# Patient Record
Sex: MALE | Race: WHITE | NOT HISPANIC OR LATINO
[De-identification: names, ages, dates, MRNs, and addresses within clinical notes are randomized per-mention and may not be internally consistent; named-entity substitution may affect disease eponyms.]

---

## 2017-05-18 ENCOUNTER — APPOINTMENT (OUTPATIENT)
Dept: ORTHOPEDIC SURGERY | Facility: CLINIC | Age: 72
End: 2017-05-18

## 2017-06-26 ENCOUNTER — APPOINTMENT (OUTPATIENT)
Dept: ORTHOPEDIC SURGERY | Facility: CLINIC | Age: 72
End: 2017-06-26

## 2017-06-26 ENCOUNTER — FORM ENCOUNTER (OUTPATIENT)
Age: 72
End: 2017-06-26

## 2017-06-26 VITALS
DIASTOLIC BLOOD PRESSURE: 76 MMHG | HEART RATE: 71 BPM | SYSTOLIC BLOOD PRESSURE: 146 MMHG | BODY MASS INDEX: 26.18 KG/M2 | HEIGHT: 71 IN | WEIGHT: 187 LBS | TEMPERATURE: 97.9 F

## 2017-06-27 ENCOUNTER — OUTPATIENT (OUTPATIENT)
Dept: OUTPATIENT SERVICES | Facility: HOSPITAL | Age: 72
LOS: 1 days | End: 2017-06-27
Payer: COMMERCIAL

## 2017-06-27 ENCOUNTER — APPOINTMENT (OUTPATIENT)
Dept: RADIOLOGY | Facility: CLINIC | Age: 72
End: 2017-06-27

## 2017-06-27 DIAGNOSIS — Z00.8 ENCOUNTER FOR OTHER GENERAL EXAMINATION: ICD-10-CM

## 2017-06-27 PROCEDURE — 27093 INJECTION FOR HIP X-RAY: CPT

## 2017-06-27 PROCEDURE — 73525 CONTRAST X-RAY OF HIP: CPT

## 2017-07-06 ENCOUNTER — APPOINTMENT (OUTPATIENT)
Dept: ULTRASOUND IMAGING | Facility: CLINIC | Age: 72
End: 2017-07-06

## 2017-07-24 ENCOUNTER — APPOINTMENT (OUTPATIENT)
Dept: ORTHOPEDIC SURGERY | Facility: CLINIC | Age: 72
End: 2017-07-24

## 2017-07-24 VITALS
BODY MASS INDEX: 26.18 KG/M2 | SYSTOLIC BLOOD PRESSURE: 145 MMHG | HEIGHT: 71 IN | WEIGHT: 187 LBS | HEART RATE: 67 BPM | DIASTOLIC BLOOD PRESSURE: 72 MMHG

## 2017-07-24 DIAGNOSIS — M16.12 UNILATERAL PRIMARY OSTEOARTHRITIS, LEFT HIP: ICD-10-CM

## 2017-07-24 RX ORDER — METFORMIN HYDROCHLORIDE 500 MG/1
500 TABLET, COATED ORAL
Refills: 0 | Status: ACTIVE | COMMUNITY

## 2017-08-07 ENCOUNTER — OTHER (OUTPATIENT)
Age: 72
End: 2017-08-07

## 2017-09-18 ENCOUNTER — APPOINTMENT (OUTPATIENT)
Dept: ORTHOPEDIC SURGERY | Facility: CLINIC | Age: 72
End: 2017-09-18
Payer: MEDICAID

## 2017-09-18 VITALS
SYSTOLIC BLOOD PRESSURE: 136 MMHG | HEART RATE: 70 BPM | WEIGHT: 187 LBS | HEIGHT: 71 IN | DIASTOLIC BLOOD PRESSURE: 68 MMHG | BODY MASS INDEX: 26.18 KG/M2

## 2017-09-18 PROCEDURE — 99213 OFFICE O/P EST LOW 20 MIN: CPT

## 2017-09-20 ENCOUNTER — OTHER (OUTPATIENT)
Age: 72
End: 2017-09-20

## 2018-01-16 ENCOUNTER — APPOINTMENT (OUTPATIENT)
Dept: CARDIOLOGY | Facility: CLINIC | Age: 73
End: 2018-01-16
Payer: MEDICAID

## 2018-01-16 PROCEDURE — 99214 OFFICE O/P EST MOD 30 MIN: CPT

## 2018-01-16 PROCEDURE — 93000 ELECTROCARDIOGRAM COMPLETE: CPT

## 2018-01-23 ENCOUNTER — APPOINTMENT (OUTPATIENT)
Dept: CARDIOLOGY | Facility: CLINIC | Age: 73
End: 2018-01-23
Payer: MEDICAID

## 2018-01-23 PROCEDURE — 93880 EXTRACRANIAL BILAT STUDY: CPT

## 2018-01-23 PROCEDURE — 93306 TTE W/DOPPLER COMPLETE: CPT

## 2018-05-15 ENCOUNTER — APPOINTMENT (OUTPATIENT)
Dept: CARDIOLOGY | Facility: CLINIC | Age: 73
End: 2018-05-15
Payer: MEDICAID

## 2018-05-15 VITALS
SYSTOLIC BLOOD PRESSURE: 132 MMHG | DIASTOLIC BLOOD PRESSURE: 73 MMHG | HEIGHT: 71 IN | RESPIRATION RATE: 14 BRPM | BODY MASS INDEX: 26.32 KG/M2 | WEIGHT: 188 LBS | HEART RATE: 61 BPM

## 2018-05-15 PROCEDURE — 99214 OFFICE O/P EST MOD 30 MIN: CPT

## 2018-05-15 PROCEDURE — 93000 ELECTROCARDIOGRAM COMPLETE: CPT

## 2018-05-25 ENCOUNTER — RX RENEWAL (OUTPATIENT)
Age: 73
End: 2018-05-25

## 2018-06-27 ENCOUNTER — RX RENEWAL (OUTPATIENT)
Age: 73
End: 2018-06-27

## 2018-06-28 ENCOUNTER — RX RENEWAL (OUTPATIENT)
Age: 73
End: 2018-06-28

## 2018-06-28 ENCOUNTER — MEDICATION RENEWAL (OUTPATIENT)
Age: 73
End: 2018-06-28

## 2018-10-01 ENCOUNTER — RX RENEWAL (OUTPATIENT)
Age: 73
End: 2018-10-01

## 2018-11-01 ENCOUNTER — APPOINTMENT (OUTPATIENT)
Dept: CARDIOLOGY | Facility: CLINIC | Age: 73
End: 2018-11-01
Payer: COMMERCIAL

## 2018-11-01 VITALS
SYSTOLIC BLOOD PRESSURE: 138 MMHG | DIASTOLIC BLOOD PRESSURE: 73 MMHG | HEIGHT: 71 IN | BODY MASS INDEX: 24.36 KG/M2 | WEIGHT: 174 LBS | HEART RATE: 52 BPM | RESPIRATION RATE: 14 BRPM

## 2018-11-01 PROCEDURE — 99214 OFFICE O/P EST MOD 30 MIN: CPT

## 2018-11-01 PROCEDURE — 93000 ELECTROCARDIOGRAM COMPLETE: CPT

## 2018-11-01 RX ORDER — ATENOLOL 25 MG/1
25 TABLET ORAL
Qty: 90 | Refills: 3 | Status: DISCONTINUED | COMMUNITY
Start: 2018-06-28 | End: 2018-11-01

## 2018-11-06 ENCOUNTER — APPOINTMENT (OUTPATIENT)
Dept: CARDIOLOGY | Facility: CLINIC | Age: 73
End: 2018-11-06
Payer: COMMERCIAL

## 2018-11-06 PROCEDURE — 93880 EXTRACRANIAL BILAT STUDY: CPT

## 2018-11-09 ENCOUNTER — RX RENEWAL (OUTPATIENT)
Age: 73
End: 2018-11-09

## 2019-01-08 ENCOUNTER — APPOINTMENT (OUTPATIENT)
Dept: CARDIOLOGY | Facility: CLINIC | Age: 74
End: 2019-01-08
Payer: COMMERCIAL

## 2019-01-08 PROCEDURE — 93015 CV STRESS TEST SUPVJ I&R: CPT

## 2019-01-08 PROCEDURE — 78452 HT MUSCLE IMAGE SPECT MULT: CPT

## 2019-01-08 PROCEDURE — A9500: CPT

## 2019-01-08 RX ORDER — REGADENOSON 0.08 MG/ML
0.4 INJECTION, SOLUTION INTRAVENOUS
Qty: 4 | Refills: 0 | Status: COMPLETED | OUTPATIENT
Start: 2019-01-08

## 2019-01-08 RX ADMIN — REGADENOSON 5 MG/5ML: 0.08 INJECTION, SOLUTION INTRAVENOUS at 00:00

## 2019-01-15 RX ORDER — KIT FOR THE PREPARATION OF TECHNETIUM TC99M SESTAMIBI 1 MG/5ML
INJECTION, POWDER, LYOPHILIZED, FOR SOLUTION PARENTERAL
Refills: 0 | Status: COMPLETED | OUTPATIENT
Start: 2019-01-15

## 2019-01-15 RX ADMIN — KIT FOR THE PREPARATION OF TECHNETIUM TC99M SESTAMIBI 0: 1 INJECTION, POWDER, LYOPHILIZED, FOR SOLUTION PARENTERAL at 00:00

## 2019-02-08 ENCOUNTER — APPOINTMENT (OUTPATIENT)
Dept: VASCULAR SURGERY | Facility: CLINIC | Age: 74
End: 2019-02-08
Payer: COMMERCIAL

## 2019-02-08 VITALS
WEIGHT: 187 LBS | TEMPERATURE: 98.4 F | BODY MASS INDEX: 26.18 KG/M2 | HEART RATE: 55 BPM | DIASTOLIC BLOOD PRESSURE: 77 MMHG | SYSTOLIC BLOOD PRESSURE: 154 MMHG | HEIGHT: 71 IN | OXYGEN SATURATION: 96 %

## 2019-02-08 DIAGNOSIS — Z87.39 PERSONAL HISTORY OF OTHER DISEASES OF THE MUSCULOSKELETAL SYSTEM AND CONNECTIVE TISSUE: ICD-10-CM

## 2019-02-08 DIAGNOSIS — Z83.3 FAMILY HISTORY OF DIABETES MELLITUS: ICD-10-CM

## 2019-02-08 DIAGNOSIS — I83.93 ASYMPTOMATIC VARICOSE VEINS OF BILATERAL LOWER EXTREMITIES: ICD-10-CM

## 2019-02-08 PROCEDURE — 93970 EXTREMITY STUDY: CPT

## 2019-02-08 PROCEDURE — 99203 OFFICE O/P NEW LOW 30 MIN: CPT

## 2019-02-08 NOTE — PHYSICAL EXAM
[Varicose Veins Of Lower Extremities] : present [Varicose Veins Of The Right Leg] : of the right leg [Alert] : alert [Oriented to Person] : oriented to person [Oriented to Place] : oriented to place [Oriented to Time] : oriented to time [Calm] : calm [JVD] : no jugular venous distention  [Carotid Bruits] : no carotid bruits [2+] : left 2+ [Ankle Swelling (On Exam)] : not present [Ankle Swelling Bilaterally] : severe [] : not present [Abdomen Masses] : No abdominal masses [Abdomen Tenderness] : ~T ~M No abdominal tenderness [Purpura] : no purpura  [Petechiae] : no petechiae [Skin Ulcer] : no ulcer [Skin Induration] : no induration [de-identified] : AAOx3.  [de-identified] : GUME LOPEZ [de-identified] : No JVD [de-identified] : non labored breathing.  [de-identified] : DWIGHTR [FreeTextEntry1] : <2 sec cap refill [de-identified] : soft, NT, ND [de-identified] : RLE has a palpable, hard, non mobile mass on the medial aspect of calf. Varicose veins from mid calf to distal knee that are non tender to palpation.

## 2019-02-08 NOTE — ASSESSMENT
[FreeTextEntry1] : 72 yo male with diagnosis of RLE phlebitis and varicose veins from his mid calf to distal knee that is stable and in no distress. \par \par 2/8 B/L LE duplex: RLE: Chronic popliteal vein lesion from previous clot. Superficial vein runs from mid calf to inguinal region. No acute DVT nor superficial clot. \par                                LLE: No vessel disease. No DVT. \par \par \par Plan:\par - Motrin for pain\par - Compression stockings \par - Leg elevation\par - exercise \par - f/u in vascular office if phlebitis remains painful and has recurrent episodes of phlebitis, as RLE stab phlebectomy would be recommended\par - Warm compression on RLE mass

## 2019-02-08 NOTE — HISTORY OF PRESENT ILLNESS
[FreeTextEntry1] : 73 year old male w PMH of HTN and HLD that presented to office to follow up for a RLE phlebitis. One month ago he went to the ED for a painful bump on his medial calf and was diagnosed with phlebitis treated with compression stockings and warm compresses. He has no active complaints. He is able to walk more than a mile without pain. Has a remote history of RLE DVT years prior. No recurrence.  Denies history of PE, and strokes. Denies fever, chills, nausea, vomiting, chest pain, and shortness of breath. Denies claudication and rest pain and ulcers or bleeding varicose veins. He is compliant with compression stockings.

## 2019-03-05 ENCOUNTER — NON-APPOINTMENT (OUTPATIENT)
Age: 74
End: 2019-03-05

## 2019-03-05 ENCOUNTER — APPOINTMENT (OUTPATIENT)
Dept: CARDIOLOGY | Facility: CLINIC | Age: 74
End: 2019-03-05
Payer: COMMERCIAL

## 2019-03-05 VITALS
HEART RATE: 68 BPM | WEIGHT: 189 LBS | RESPIRATION RATE: 14 BRPM | BODY MASS INDEX: 26.46 KG/M2 | DIASTOLIC BLOOD PRESSURE: 63 MMHG | SYSTOLIC BLOOD PRESSURE: 124 MMHG | HEIGHT: 71 IN

## 2019-03-05 PROCEDURE — 99214 OFFICE O/P EST MOD 30 MIN: CPT

## 2019-03-05 PROCEDURE — 93000 ELECTROCARDIOGRAM COMPLETE: CPT

## 2019-03-05 NOTE — HISTORY OF PRESENT ILLNESS
[FreeTextEntry1] : He mostly has some additional somatic complaints such as arthritides of the joints of the upper lower extremities and also apparently had an episode of superficial thrombophlebitis in the right lower extremity recently which was evaluated and recommended for one compresses and anti-inflammatory medication;\par \par There was no recollection of particular injury but patient does have a history of multiple varicosities of the lower extremities;

## 2019-03-05 NOTE — REASON FOR VISIT
[Follow-Up - Clinic] : a clinic follow-up of [FreeTextEntry1] : The patient is a 73-year-old gentleman with a known history for peripheral arterial disease with moderate plaquing stenosis in the left carotid artery was also been treated for mild hypertension and hyperlipidemia presents back to the office today for general cardiac checkup;\par \par He reports for the most part he has had no significant symptoms of chest pain, shortness of breath, palpitations, dizziness or syncope.;\par

## 2019-03-05 NOTE — PHYSICAL EXAM
[General Appearance - Well Developed] : well developed [Normal Appearance] : normal appearance [Well Groomed] : well groomed [General Appearance - Well Nourished] : well nourished [No Deformities] : no deformities [General Appearance - In No Acute Distress] : no acute distress [Normal Conjunctiva] : the conjunctiva exhibited no abnormalities [Eyelids - No Xanthelasma] : the eyelids demonstrated no xanthelasmas [Normal Oral Mucosa] : normal oral mucosa [No Oral Pallor] : no oral pallor [No Oral Cyanosis] : no oral cyanosis [Normal Jugular Venous A Waves Present] : normal jugular venous A waves present [Normal Jugular Venous V Waves Present] : normal jugular venous V waves present [No Jugular Venous Forman A Waves] : no jugular venous forman A waves [Respiration, Rhythm And Depth] : normal respiratory rhythm and effort [Exaggerated Use Of Accessory Muscles For Inspiration] : no accessory muscle use [Auscultation Breath Sounds / Voice Sounds] : lungs were clear to auscultation bilaterally [Abdomen Soft] : soft [Abdomen Tenderness] : non-tender [Abdomen Mass (___ Cm)] : no abdominal mass palpated [Abnormal Walk] : normal gait [Gait - Sufficient For Exercise Testing] : the gait was sufficient for exercise testing [Nail Clubbing] : no clubbing of the fingernails [Cyanosis, Localized] : no localized cyanosis [Petechial Hemorrhages (___cm)] : no petechial hemorrhages [Fingers Osler's Nodes] : Osler's nodes were not seenon the fingers [FreeTextEntry1] : Moderate varicosities of the lower legs noted bilaterally [Skin Color & Pigmentation] : normal skin color and pigmentation [] : no rash [No Venous Stasis] : no venous stasis [Skin Lesions] : no skin lesions [No Skin Ulcers] : no skin ulcer [No Xanthoma] : no  xanthoma was observed [Oriented To Time, Place, And Person] : oriented to person, place, and time [Affect] : the affect was normal [Mood] : the mood was normal [No Anxiety] : not feeling anxious

## 2019-03-05 NOTE — ASSESSMENT
[FreeTextEntry1] : EKG shows normal sinus rhythm at a rate of 68, early R-wave transition V1 to V2, rare PAC noted and no acute changes;\par \par \par Pharmacologic nuclear stress test performed in January 2019 demonstrated no evidence of any myocardial perfusion abnormalities and with normal LV systolic function and normal ejection fraction i.e. negative test;\par \par In summary the patient is a 73-year-old gentleman with a history of moderate carotid plaquing stenosis and negative nuclear stress test recently in the office who has had an otherwise stable cardiac pattern with some superficial thrombophlebitis which seems to be resolving.;\par \par Plan:\par \par Patient counseled on continuing her multiple medical regimen;\par \par Timely checkups and laboratory blood tests for primary care and encouraged;\par \par No additional cardiac workup indicated at this time;\par \par Will likely recheck carotid duplex study sometime this fall for comparison;\par Followup within 4-5 months or p.r.n.

## 2019-04-15 ENCOUNTER — MEDICATION RENEWAL (OUTPATIENT)
Age: 74
End: 2019-04-15

## 2019-04-15 ENCOUNTER — RX RENEWAL (OUTPATIENT)
Age: 74
End: 2019-04-15

## 2019-05-23 ENCOUNTER — RX RENEWAL (OUTPATIENT)
Age: 74
End: 2019-05-23

## 2019-08-07 ENCOUNTER — NON-APPOINTMENT (OUTPATIENT)
Age: 74
End: 2019-08-07

## 2019-08-07 ENCOUNTER — APPOINTMENT (OUTPATIENT)
Dept: CARDIOLOGY | Facility: CLINIC | Age: 74
End: 2019-08-07
Payer: COMMERCIAL

## 2019-08-07 ENCOUNTER — MEDICATION RENEWAL (OUTPATIENT)
Age: 74
End: 2019-08-07

## 2019-08-07 VITALS
HEART RATE: 72 BPM | DIASTOLIC BLOOD PRESSURE: 72 MMHG | RESPIRATION RATE: 14 BRPM | SYSTOLIC BLOOD PRESSURE: 128 MMHG | BODY MASS INDEX: 25.06 KG/M2 | HEIGHT: 71 IN | WEIGHT: 179 LBS

## 2019-08-07 DIAGNOSIS — I83.819 VARICOSE VEINS OF UNSPECIFIED LOWER EXTREMITY WITH PAIN: ICD-10-CM

## 2019-08-07 DIAGNOSIS — I80.3 PHLEBITIS AND THROMBOPHLEBITIS OF LOWER EXTREMITIES, UNSPECIFIED: ICD-10-CM

## 2019-08-07 PROCEDURE — 93000 ELECTROCARDIOGRAM COMPLETE: CPT

## 2019-08-07 PROCEDURE — 99214 OFFICE O/P EST MOD 30 MIN: CPT

## 2019-08-07 NOTE — PHYSICAL EXAM
[General Appearance - Well Developed] : well developed [Normal Appearance] : normal appearance [Well Groomed] : well groomed [General Appearance - Well Nourished] : well nourished [No Deformities] : no deformities [General Appearance - In No Acute Distress] : no acute distress [Normal Conjunctiva] : the conjunctiva exhibited no abnormalities [Eyelids - No Xanthelasma] : the eyelids demonstrated no xanthelasmas [Normal Oral Mucosa] : normal oral mucosa [No Oral Pallor] : no oral pallor [No Oral Cyanosis] : no oral cyanosis [Normal Jugular Venous A Waves Present] : normal jugular venous A waves present [Normal Jugular Venous V Waves Present] : normal jugular venous V waves present [No Jugular Venous Forman A Waves] : no jugular venous forman A waves [Respiration, Rhythm And Depth] : normal respiratory rhythm and effort [Exaggerated Use Of Accessory Muscles For Inspiration] : no accessory muscle use [Auscultation Breath Sounds / Voice Sounds] : lungs were clear to auscultation bilaterally [Abdomen Soft] : soft [Abdomen Tenderness] : non-tender [Abdomen Mass (___ Cm)] : no abdominal mass palpated [Abnormal Walk] : normal gait [Gait - Sufficient For Exercise Testing] : the gait was sufficient for exercise testing [Nail Clubbing] : no clubbing of the fingernails [Cyanosis, Localized] : no localized cyanosis [Petechial Hemorrhages (___cm)] : no petechial hemorrhages [FreeTextEntry1] : Moderate varicosities of the lower legs noted bilaterally-right greater than the left [Skin Color & Pigmentation] : normal skin color and pigmentation [] : no rash [No Venous Stasis] : no venous stasis [Skin Lesions] : no skin lesions [No Skin Ulcers] : no skin ulcer [No Xanthoma] : no  xanthoma was observed [Oriented To Time, Place, And Person] : oriented to person, place, and time [Affect] : the affect was normal [Mood] : the mood was normal [No Anxiety] : not feeling anxious

## 2019-08-07 NOTE — HISTORY OF PRESENT ILLNESS
[FreeTextEntry1] : He's been tolerating his current medical regimen without difficulty;\par \par States that his diabetes has been controlled on current regimen\par \par ;

## 2019-08-07 NOTE — ASSESSMENT
[FreeTextEntry1] : EKG shows normal sinus rhythm at a rate of 66 essentially within normal limits\par \par In summary the patient is a 73-year-old gentleman with a history of mild hypertension well-controlled on current medical regimen, hyperlipidemia and diabetes who's had a stable cardiac pattern\par \par Last nuclear stress test was November 2018 and was negative for ischemia;\par \par Plan:\par \par No additional cardiac workup indicated at this time;\par \par Patient encouraged to continue current medical regimen and moderate exercise regimen\par \par Precautions about varicose veins discussed;\par \par Followup with primary care for timely checkups and laboratory blood tests encouraged;\par \par Return to office within 6 months or p.r.n.;;

## 2019-08-07 NOTE — REASON FOR VISIT
[Follow-Up - Clinic] : a clinic follow-up of [FreeTextEntry1] : The patient is a 73-year-old white male with a known history for carotid artery plaquing, hypertension and hyperlipidemia who presents back to the office for general cardiac checkup;\par \par Patient has been doing well through the summer but reminds me he did have a case of phlebitis of the lower right leg ankle earlier this year which responded to nonsteroidal anti-inflammatory meds;\par \par Otherwise, he denies chest pain, shortness of breath, palpitations or syncope. He admits to occasional or rare feeling dizziness;

## 2020-01-27 ENCOUNTER — NON-APPOINTMENT (OUTPATIENT)
Age: 75
End: 2020-01-27

## 2020-01-27 ENCOUNTER — APPOINTMENT (OUTPATIENT)
Dept: CARDIOLOGY | Facility: CLINIC | Age: 75
End: 2020-01-27
Payer: COMMERCIAL

## 2020-01-27 VITALS
RESPIRATION RATE: 14 BRPM | BODY MASS INDEX: 26.04 KG/M2 | WEIGHT: 186 LBS | HEIGHT: 71 IN | SYSTOLIC BLOOD PRESSURE: 128 MMHG | HEART RATE: 64 BPM | DIASTOLIC BLOOD PRESSURE: 67 MMHG

## 2020-01-27 PROCEDURE — 99214 OFFICE O/P EST MOD 30 MIN: CPT

## 2020-01-27 PROCEDURE — 93000 ELECTROCARDIOGRAM COMPLETE: CPT

## 2020-01-27 NOTE — HISTORY OF PRESENT ILLNESS
[FreeTextEntry1] : He has been tolerating his current medical regimen without difficulty;\par \par Last nuclear stress test was November 2018 and was negative for ischemia;\par \par

## 2020-01-27 NOTE — REASON FOR VISIT
[Follow-Up - Clinic] : a clinic follow-up of [FreeTextEntry1] : The patient is a 74-year-old gentleman with known history for mixed hyperlipidemia, carotid artery plaquing stenosis, hypertension who presents back to the office today for general cardiac check a;\par \par Patient reports overall he has been without symptoms of chest pain, shortness of breath, palpitations or dizziness;\par \par He has however been complaining of diffuse arthritides of the lower extremities knees and both shoulders which he is reluctant to do any definitive surgery for;\par \par

## 2020-01-27 NOTE — REVIEW OF SYSTEMS
[Joint Pain] : joint pain [Joint Stiffness] : joint stiffness [Shoulder Pain] : shoulder pain [Knee Problem] : knee problems [Negative] : Heme/Lymph

## 2020-01-27 NOTE — ASSESSMENT
[FreeTextEntry1] : EKG demonstrates normal sinus rhythm at a rate of 64-essentially within normal limits\par \par Last carotid duplex study in 2018 demonstrated moderate carotid plaquing stenosis on the left (50-69%.)  With mild less  (than 50% on the right)  stenosis);;\par \par In summary the patient is a 74-year-old gentleman who has a history of moderate carotid plaquing stenosis, associated cardiac risk factors and stable cardiac that\par \par Plan:\par \par Patient recommended to schedule transthoracic echocardiogram to assess valvulopathy and cardiac follow\par \par Recommend carotid duplex study in the near future to assess carotid plaquing stenosis;\par \par Followup to this office within 4-6 months or p.r.n.;\par \par Recommended supplements to treat hyper triglyceridemia (Krill oil caps);- and joint formula supplements-as tolerated;\par \par Continued timely checkups and laboratory blood tests with primary care

## 2020-02-13 ENCOUNTER — APPOINTMENT (OUTPATIENT)
Dept: ORTHOPEDIC SURGERY | Facility: CLINIC | Age: 75
End: 2020-02-13

## 2020-02-13 ENCOUNTER — APPOINTMENT (OUTPATIENT)
Dept: ORTHOPEDIC SURGERY | Facility: CLINIC | Age: 75
End: 2020-02-13
Payer: COMMERCIAL

## 2020-02-13 VITALS
WEIGHT: 180 LBS | HEIGHT: 71 IN | SYSTOLIC BLOOD PRESSURE: 145 MMHG | HEART RATE: 55 BPM | DIASTOLIC BLOOD PRESSURE: 69 MMHG | BODY MASS INDEX: 25.2 KG/M2

## 2020-02-13 DIAGNOSIS — M19.019 PRIMARY OSTEOARTHRITIS, UNSPECIFIED SHOULDER: ICD-10-CM

## 2020-02-13 PROCEDURE — 73030 X-RAY EXAM OF SHOULDER: CPT | Mod: LT

## 2020-02-13 PROCEDURE — 99214 OFFICE O/P EST MOD 30 MIN: CPT | Mod: 25

## 2020-02-13 PROCEDURE — 20610 DRAIN/INJ JOINT/BURSA W/O US: CPT | Mod: RT

## 2020-03-30 ENCOUNTER — OUTPATIENT (OUTPATIENT)
Dept: OUTPATIENT SERVICES | Facility: HOSPITAL | Age: 75
LOS: 1 days | End: 2020-03-30

## 2020-03-30 ENCOUNTER — RESULT REVIEW (OUTPATIENT)
Age: 75
End: 2020-03-30

## 2020-03-30 ENCOUNTER — APPOINTMENT (OUTPATIENT)
Dept: ULTRASOUND IMAGING | Facility: CLINIC | Age: 75
End: 2020-03-30
Payer: COMMERCIAL

## 2020-03-30 DIAGNOSIS — M75.32 CALCIFIC TENDINITIS OF LEFT SHOULDER: ICD-10-CM

## 2020-03-30 PROCEDURE — 20611 DRAIN/INJ JOINT/BURSA W/US: CPT | Mod: LT

## 2020-06-03 ENCOUNTER — RX RENEWAL (OUTPATIENT)
Age: 75
End: 2020-06-03

## 2020-06-05 ENCOUNTER — APPOINTMENT (OUTPATIENT)
Dept: ORTHOPEDIC SURGERY | Facility: CLINIC | Age: 75
End: 2020-06-05
Payer: COMMERCIAL

## 2020-06-05 VITALS — TEMPERATURE: 98.4 F

## 2020-06-05 DIAGNOSIS — M75.80 OTHER SHOULDER LESIONS, UNSPECIFIED SHOULDER: ICD-10-CM

## 2020-06-05 PROCEDURE — 73030 X-RAY EXAM OF SHOULDER: CPT | Mod: LT

## 2020-06-05 PROCEDURE — 99214 OFFICE O/P EST MOD 30 MIN: CPT | Mod: 25

## 2020-06-05 PROCEDURE — 20610 DRAIN/INJ JOINT/BURSA W/O US: CPT | Mod: LT

## 2020-08-21 ENCOUNTER — APPOINTMENT (OUTPATIENT)
Dept: ORTHOPEDIC SURGERY | Facility: CLINIC | Age: 75
End: 2020-08-21
Payer: COMMERCIAL

## 2020-08-21 VITALS
SYSTOLIC BLOOD PRESSURE: 126 MMHG | HEART RATE: 70 BPM | DIASTOLIC BLOOD PRESSURE: 70 MMHG | WEIGHT: 180 LBS | HEIGHT: 71 IN | BODY MASS INDEX: 25.2 KG/M2 | TEMPERATURE: 97.6 F

## 2020-08-21 PROCEDURE — 99214 OFFICE O/P EST MOD 30 MIN: CPT

## 2020-08-21 PROCEDURE — 73030 X-RAY EXAM OF SHOULDER: CPT | Mod: RT

## 2020-09-28 ENCOUNTER — APPOINTMENT (OUTPATIENT)
Dept: CARDIOLOGY | Facility: CLINIC | Age: 75
End: 2020-09-28
Payer: COMMERCIAL

## 2020-09-28 PROCEDURE — 93880 EXTRACRANIAL BILAT STUDY: CPT

## 2020-09-28 PROCEDURE — 93306 TTE W/DOPPLER COMPLETE: CPT

## 2020-10-08 ENCOUNTER — APPOINTMENT (OUTPATIENT)
Dept: ORTHOPEDIC SURGERY | Facility: CLINIC | Age: 75
End: 2020-10-08
Payer: COMMERCIAL

## 2020-10-08 DIAGNOSIS — M19.019 PRIMARY OSTEOARTHRITIS, UNSPECIFIED SHOULDER: ICD-10-CM

## 2020-10-08 PROCEDURE — 99214 OFFICE O/P EST MOD 30 MIN: CPT

## 2020-10-08 RX ORDER — MELOXICAM 7.5 MG/1
7.5 TABLET ORAL
Qty: 21 | Refills: 0 | Status: COMPLETED | COMMUNITY
Start: 2020-10-08 | End: 2020-10-29

## 2020-10-11 NOTE — PHYSICAL EXAM
[de-identified] : Physical Exam:\par General: Well appearing, no acute distress\par Neurologic: A&Ox3, No focal deficits\par Head: NCAT without abrasions, lacerations, or ecchymosis to head, face, or scalp\par Eyes: No scleral icterus, no gross abnormalities\par Respiratory: Equal chest wall expansion bilaterally, no accessory muscle use\par Lymphatic: No lymphadenopathy palpated\par Skin: Warm and dry\par Psychiatric: Normal mood and affect\par \par Right Shoulder\par ·	Inspection/Palpation: Tenderness at infraspinatus insertion, no swelling or deformities\par ·	Range of Motion: no crepitus with ROM; Active FF 0-110; ER at side 30; IR to back pocket with pain ; Passive FF 0-120; ER at side 35; IR to back pocket with pain\par ·	Strength: forward elevation in scapular plane [4/5], internal rotation [4/5], external rotation [4/5], adduction [4/5] and abduction [4/5]\par ·	Stability: no joint instability on provocative testing\par ·	Tests: Anderson test negative, Neer sign negative, POS drop arm test secondary to pain, bear hug test POS, Napolean sign POS, cross arm adduction POS, lift off sign positive, hornblowers sign negative, speeds test POS, Yergason's test POS, no bicipital groove tenderness, Farias's Active Compression test POS\par \par Left Shoulder\par ·	Inspection/Palpation: Tenderness at supraspinatus insertion, no crepitus, no deformities\par ·	Range of Motion: no crepitus with ROM; Active ; ER at side 20; IR to L1; Passive ; ER at side 25; IR to L1\par ·	Strength: forward elevation in scapular plane [4/5], internal rotation [4/5], external rotation [4/5], adduction [4/5] and abduction [4/5]\par ·	Stability: no joint instability on provocative testing\par ·	Tests: Anderson test positive, Neer positive, positive drop arm test secondary to pain, bear hug test positive, Napolean sign POS, cross arm adduction positive, lift off sign positive, hornblowers sign POS, speeds test negative, Yergason's test negative, Farias's Active Compression test negative, whipple test positive, bicep's load II test negative\par  [de-identified] : MRI to BILAT shoulders reveals degenerative labrum tearing, biceps tendinosis, AC Joint arthritis and capsular thickening

## 2020-10-11 NOTE — DISCUSSION/SUMMARY
[de-identified] : JESUS is a 74 year male who presents today for follow up for chronic BILAT shoulder pain and MRI review. MRI to BILAT shoulders reveals degenerative labrum tearing, biceps tendinosis, AC Joint arthritis and capsular thickening, BILAT. He performed this imaging at [facility].\par \par Currently patient admits to stable but constant BILAT shoulder pain. He admits to a cortisone injection performed by us to his left shoulder in June with a perc lavage aspiration to BILAT shoulders for persistent calcific tendonitis in March.\par \par At this time due to improvement with formal PT, patient elects to continue with conservative measures with oral NSAIDs and PT. We will see him when his pain returns for possible cortisone injection versus surgical discussion. He agrees with the above plan and all questions were answered.\par

## 2020-10-11 NOTE — REVIEW OF SYSTEMS
[Joint Pain] : joint pain [Joint Stiffness] : joint stiffness [Negative] : Heme/Lymph [FreeTextEntry9] : Bilateral shoulder

## 2020-10-11 NOTE — HISTORY OF PRESENT ILLNESS
[de-identified] : JESUS is a 74 year male who presents today for follow up for chronic BILAT shoulder pain and MRI review. MRI to BILAT shoulders reveals degenerative labrum tearing, biceps tendinosis, AC Joint arthritis and capsular thickening, BILAT. He performed this [facility] at [facility].\par \par Currently patient admits to stable but constant BILAT shoulder pain. He admits to a cortisone injection performed by us to his left shoulder in June with a perc lavage aspiration to BILAT shoulders for persistent calcific tendonitis in March.

## 2020-10-27 ENCOUNTER — APPOINTMENT (OUTPATIENT)
Dept: ORTHOPEDIC SURGERY | Facility: CLINIC | Age: 75
End: 2020-10-27
Payer: COMMERCIAL

## 2020-10-27 DIAGNOSIS — M24.111 OTHER ARTICULAR CARTILAGE DISORDERS, RIGHT SHOULDER: ICD-10-CM

## 2020-10-27 DIAGNOSIS — M67.814 OTHER SPECIFIED DISORDERS OF TENDON, RIGHT SHOULDER: ICD-10-CM

## 2020-10-27 DIAGNOSIS — M67.813 OTHER SPECIFIED DISORDERS OF TENDON, RIGHT SHOULDER: ICD-10-CM

## 2020-10-27 DIAGNOSIS — M24.112 OTHER ARTICULAR CARTILAGE DISORDERS, LEFT SHOULDER: ICD-10-CM

## 2020-10-27 DIAGNOSIS — M65.20 CALCIFIC TENDINITIS, UNSPECIFIED SITE: ICD-10-CM

## 2020-10-27 PROCEDURE — 20610 DRAIN/INJ JOINT/BURSA W/O US: CPT | Mod: LT

## 2020-10-27 PROCEDURE — 99072 ADDL SUPL MATRL&STAF TM PHE: CPT

## 2020-10-27 PROCEDURE — 99214 OFFICE O/P EST MOD 30 MIN: CPT | Mod: 25

## 2020-10-27 NOTE — PROCEDURE
[de-identified] : Injection: Left shoulder (Subacromial).\par Indication: Pain\par \par A discussion was had with the patient regarding this procedure and all questions were answered. All risks, benefits and alternatives were discussed. These included but were not limited to bleeding, infection, and allergic reaction. Alcohol was used to clean the skin, and betadine was used to sterilize and prep the area in the posterior aspect of the left shoulder. Ethyl chloride spray was then used as a topical anesthetic. A 22-gauge needle was used to inject 3cc 1% xylocaine, 2cc 0.25% bupivacaine and 1cc of 40mg/mL triamcinolone acetonide into the left subacromial space. A sterile bandage was then applied. The patient tolerated the procedure well and there were no complications.\par

## 2020-10-27 NOTE — DISCUSSION/SUMMARY
[de-identified] : JESUS is a 74 year male who presents today for follow up for chronic BILAT shoulder pain and MRI review. MRI to BILAT shoulders reveals degenerative labrum tearing, biceps tendinosis, AC Joint arthritis and capsular thickening, BILAT. He performed this [facility] at [facility].\par \par Currently, patient admits to stable but constant BILAT shoulder pain. He admits to a cortisone injection performed by us to his left shoulder in June with a perc lavage aspiration to BILAT shoulders for persistent calcific tendonitis in March. He admits to PT 1x/week and does not feel it is affective. He c/o pain with all shoulder movements. Pt wishes to discuss another cortisone injection today.\par \par The patient due to his acute left shoulder pain today as well as some stiffness on clinical exam today, elects for a cortisone injection that he tolerated well. He will ice the joint over this next week and restart PT at that time 2x/week unil seeing us again in 6-8 weeks. He agrees with the above plan and all questions were answered.

## 2020-10-27 NOTE — PHYSICAL EXAM
[de-identified] : Physical Exam:\par General: Well appearing, no acute distress\par Neurologic: A&Ox3, No focal deficits\par Head: NCAT without abrasions, lacerations, or ecchymosis to head, face, or scalp\par Eyes: No scleral icterus, no gross abnormalities\par Respiratory: Equal chest wall expansion bilaterally, no accessory muscle use\par Lymphatic: No lymphadenopathy palpated\par Skin: Warm and dry\par Psychiatric: Normal mood and affect\par \par Right Shoulder\par ·	Inspection/Palpation: Tenderness at infraspinatus insertion, no swelling or deformities\par ·	Range of Motion: no crepitus with ROM; Active FF 0-110; ER at side 30; IR to back pocket with pain ; Passive FF 0-120; ER at side 35; IR to back pocket with pain\par ·	Strength: forward elevation in scapular plane [4/5], internal rotation [4/5], external rotation [4/5], adduction [4/5] and abduction [4/5]\par ·	Stability: no joint instability on provocative testing\par ·	Tests: Anderson test negative, Neer sign negative, POS drop arm test secondary to pain, bear hug test POS, Napolean sign POS, cross arm adduction POS, lift off sign positive, hornblowers sign negative, speeds test POS, Yergason's test POS, no bicipital groove tenderness, Farias's Active Compression test POS\par \par Left Shoulder\par ·	Inspection/Palpation: Tenderness at supraspinatus insertion, no crepitus, no deformities\par ·	Range of Motion: no crepitus with ROM; Active ; ER at side 20; IR to L1; Passive ; ER at side 25; IR to L1\par ·	Strength: forward elevation in scapular plane [4/5], internal rotation [4/5], external rotation [4/5], adduction [4/5] and abduction [4/5]\par ·	Stability: no joint instability on provocative testing\par ·	Tests: Anderson test positive, Neer positive, positive drop arm test secondary to pain, bear hug test positive, Napolean sign POS, cross arm adduction positive, lift off sign positive, hornblowers sign POS, speeds test negative, Yergason's test negative, Farias's Active Compression test negative, whipple test positive, bicep's load II test negative\par  [de-identified] : MRI to BILAT shoulders reveals degenerative labrum tearing, biceps tendinosis, AC Joint arthritis and capsular thickening

## 2020-10-27 NOTE — HISTORY OF PRESENT ILLNESS
[Pain Location] : pain [Stable] : stable [___ mths] : [unfilled] month(s) ago [de-identified] : JESUS is a 74 year male who presents today for follow up for chronic BILAT shoulder pain and MRI review. MRI to BILAT shoulders reveals degenerative labrum tearing, biceps tendinosis, AC Joint arthritis and capsular thickening, BILAT. He performed this [facility] at [facility].\par \par Currently, patient admits to stable but constant BILAT shoulder pain. He admits to a cortisone injection performed by us to his left shoulder in June with a perc lavage aspiration to BILAT shoulders for persistent calcific tendonitis in March. He admits to PT 1x/week and does not feel it is affective. He c/o pain with all shoulder movements. Pt wishes to discuss another cortisone injection today.

## 2020-10-28 ENCOUNTER — APPOINTMENT (OUTPATIENT)
Dept: CARDIOLOGY | Facility: CLINIC | Age: 75
End: 2020-10-28
Payer: COMMERCIAL

## 2020-10-28 ENCOUNTER — NON-APPOINTMENT (OUTPATIENT)
Age: 75
End: 2020-10-28

## 2020-10-28 VITALS
WEIGHT: 180 LBS | RESPIRATION RATE: 16 BRPM | DIASTOLIC BLOOD PRESSURE: 70 MMHG | BODY MASS INDEX: 25.2 KG/M2 | HEART RATE: 70 BPM | HEIGHT: 71 IN | TEMPERATURE: 98 F | SYSTOLIC BLOOD PRESSURE: 148 MMHG

## 2020-10-28 PROCEDURE — 93000 ELECTROCARDIOGRAM COMPLETE: CPT

## 2020-10-28 PROCEDURE — 99214 OFFICE O/P EST MOD 30 MIN: CPT

## 2020-10-28 PROCEDURE — 99072 ADDL SUPL MATRL&STAF TM PHE: CPT

## 2020-10-28 RX ORDER — DICLOFENAC SODIUM 75 MG/1
75 TABLET, DELAYED RELEASE ORAL
Qty: 21 | Refills: 0 | Status: DISCONTINUED | COMMUNITY
Start: 2020-08-21 | End: 2020-10-28

## 2020-10-28 NOTE — ASSESSMENT
[FreeTextEntry1] : EKG demonstrated a normal sinus rhythm at a rate of 70. Early R-wave transition V1 to V2 and otherwise no acute changes\par \par In summary this 74-year-old gentleman has a history of moderate carotid plaquing stenosis-left side greater than the right in the range of 50-69%;\par Otherwise stable cardiac pattern on current multiple medical regimen\par \par Plan:\par \par Counseled patient on low sodium diet and monitoring blood pressures in the future;\par \par Followup with primary care for general cardiac checkups and laboratory blood tests\par \par Return to this office within 5 months or p.r.n.\par \par continue current medical regimen;\par ;;;;

## 2020-10-28 NOTE — REASON FOR VISIT
[Follow-Up - Clinic] : a clinic follow-up of [FreeTextEntry1] : The patient is a 74-year-old white male with known history for mixed hyperlipidemia, carotid plaquing stenosis and hypertension who presents back to the office for checkup today;\par \par Patient underwent recent cardiac testing including echocardiogram and carotid duplex study and her for those results;\par \par Overall, he states he's been feeling well and has had no significant chest pain, shortness of breath, palpitations or dizziness;

## 2020-10-28 NOTE — HISTORY OF PRESENT ILLNESS
[FreeTextEntry1] : patient reports some arthritides of the extremities and recent steroid injection to the right shoulder;\par \par Carotid duplex study from 9/2820 shows mild carotid plaquing stenosis on the right and moderate carotid plaquing stenosis in the range of 50-69% obstruction of the left side. This is unchanged from previous study from 2018;\par \par Echo shows preserved cardiac chamber sizes with normal systolic function. EF equals 55-60%. Trace MR;

## 2021-03-11 ENCOUNTER — APPOINTMENT (OUTPATIENT)
Dept: CARDIOLOGY | Facility: CLINIC | Age: 76
End: 2021-03-11

## 2021-03-19 ENCOUNTER — APPOINTMENT (OUTPATIENT)
Dept: PULMONOLOGY | Facility: CLINIC | Age: 76
End: 2021-03-19
Payer: COMMERCIAL

## 2021-03-19 VITALS
HEART RATE: 77 BPM | HEIGHT: 71 IN | TEMPERATURE: 97.3 F | OXYGEN SATURATION: 98 % | BODY MASS INDEX: 22.4 KG/M2 | SYSTOLIC BLOOD PRESSURE: 130 MMHG | WEIGHT: 160 LBS | DIASTOLIC BLOOD PRESSURE: 70 MMHG

## 2021-03-19 PROCEDURE — 99204 OFFICE O/P NEW MOD 45 MIN: CPT

## 2021-03-19 PROCEDURE — 99072 ADDL SUPL MATRL&STAF TM PHE: CPT

## 2021-03-19 RX ORDER — GABAPENTIN 300 MG/1
300 CAPSULE ORAL
Refills: 0 | Status: COMPLETED | COMMUNITY
End: 2021-03-19

## 2021-03-19 RX ORDER — MELOXICAM 15 MG/1
15 TABLET ORAL
Qty: 21 | Refills: 1 | Status: COMPLETED | COMMUNITY
Start: 2020-06-05 | End: 2021-03-19

## 2021-03-19 NOTE — ASSESSMENT
[FreeTextEntry1] : Patient shortness of breath is probably on the basis of deconditioning from Covid 19 infection. He has an exaggerated heart rate response but is not desaturating. He is seeing cardiology next week.\par \par For the moment, I have reassured the patient regarding this, but will repeat the CT scan in 2 months. If he remains short of breath or the CT remains abnormal, we will get pulmonary function studies at that time.

## 2021-03-19 NOTE — CONSULT LETTER
[Dear  ___] : Dear  [unfilled], [Consult Letter:] : I had the pleasure of evaluating your patient, [unfilled]. [Please see my note below.] : Please see my note below. [Consult Closing:] : Thank you very much for allowing me to participate in the care of this patient.  If you have any questions, please do not hesitate to contact me. [Sincerely,] : Sincerely, [FreeTextEntry3] : Veronica Khan MD FCCP\par D-ABSM\par [employer]IM board certified in  Pulmonary diseases, Sleep medicine\par Internal medicine\par

## 2021-03-19 NOTE — HISTORY OF PRESENT ILLNESS
[TextBox_4] : The patient is a 75-year-old male who was previously in good health until January of this year when he developed Covid 19 infection. He was not hospitalized. He had some fevers for a few days and developed a dry cough. He developed some increasing shortness of breath and was seen in urgent care where chest x-ray showed bilateral infiltrates. He went for a CT scan of the chest last week and was referred here. Dyspnea is occurring on exertion. It should also be noted that the patient had back surgery about 4 weeks ago and has been relatively sedentary from that. Last year he had diverticulitis. He has been seen by cardiology but without any specific issues. He has an occasional cigar smoker. He denies prior shortness of breath and before his back issues was swimming every day. Denies sleep disturbance.

## 2021-04-02 ENCOUNTER — APPOINTMENT (OUTPATIENT)
Dept: CARDIOLOGY | Facility: CLINIC | Age: 76
End: 2021-04-02
Payer: COMMERCIAL

## 2021-04-02 ENCOUNTER — NON-APPOINTMENT (OUTPATIENT)
Age: 76
End: 2021-04-02

## 2021-04-02 VITALS
WEIGHT: 160 LBS | DIASTOLIC BLOOD PRESSURE: 60 MMHG | SYSTOLIC BLOOD PRESSURE: 110 MMHG | OXYGEN SATURATION: 98 % | RESPIRATION RATE: 16 BRPM | TEMPERATURE: 96 F | HEIGHT: 71 IN | HEART RATE: 65 BPM | BODY MASS INDEX: 22.4 KG/M2

## 2021-04-02 PROCEDURE — 93000 ELECTROCARDIOGRAM COMPLETE: CPT

## 2021-04-02 PROCEDURE — 99072 ADDL SUPL MATRL&STAF TM PHE: CPT

## 2021-04-02 PROCEDURE — 99214 OFFICE O/P EST MOD 30 MIN: CPT

## 2021-04-02 NOTE — PHYSICAL EXAM
[General Appearance - Well Developed] : well developed [Normal Appearance] : normal appearance [Well Groomed] : well groomed [General Appearance - Well Nourished] : well nourished [No Deformities] : no deformities [Normal Conjunctiva] : the conjunctiva exhibited no abnormalities [Eyelids - No Xanthelasma] : the eyelids demonstrated no xanthelasmas [Normal Oral Mucosa] : normal oral mucosa [No Oral Pallor] : no oral pallor [No Oral Cyanosis] : no oral cyanosis [Normal Jugular Venous A Waves Present] : normal jugular venous A waves present [Normal Jugular Venous V Waves Present] : normal jugular venous V waves present [No Jugular Venous Forman A Waves] : no jugular venous forman A waves [Respiration, Rhythm And Depth] : normal respiratory rhythm and effort [Exaggerated Use Of Accessory Muscles For Inspiration] : no accessory muscle use [Abdomen Soft] : soft [Abdomen Tenderness] : non-tender [Abdomen Mass (___ Cm)] : no abdominal mass palpated [Abnormal Walk] : normal gait [Gait - Sufficient For Exercise Testing] : the gait was sufficient for exercise testing [Nail Clubbing] : no clubbing of the fingernails [Cyanosis, Localized] : no localized cyanosis [Petechial Hemorrhages (___cm)] : no petechial hemorrhages [FreeTextEntry1] : Moderate varicosities of the lower legs noted bilaterally-right greater than the left [Skin Color & Pigmentation] : normal skin color and pigmentation [] : no rash [No Venous Stasis] : no venous stasis [Skin Lesions] : no skin lesions [No Skin Ulcers] : no skin ulcer [No Xanthoma] : no  xanthoma was observed [Oriented To Time, Place, And Person] : oriented to person, place, and time [Affect] : the affect was normal [Mood] : the mood was normal [No Anxiety] : not feeling anxious

## 2021-04-02 NOTE — REASON FOR VISIT
[Follow-Up - Clinic] : a clinic follow-up of [FreeTextEntry1] : The patient is a 75-year-old white male with a known history for mixed hyperlipidemia, mild hypertension and carotid plaquing stenosis who presents back to our office today for cardiac evaluation;\par \par Patient reports that he has had a difficult past 6 months which included developing Covid 19 infection January 2021, but affected his lungs as well but he escaped hospitalization with the exception of undergoing later in January spinal surgery for disc compression and severe sciatica, and then having complicating diverticulitis subsequently as well;\par \par Patient was told during the hospitalization that he may have slightly enlarged heart on some radiographic studies for which he was concerned;\par \par Today, patient reports that he has been convalescing well and still gets a little nondescript chest tightness and mild shortness of breath but no chest pain, palpitations, dizziness or syncope;

## 2021-04-02 NOTE — ASSESSMENT
[FreeTextEntry1] : EKG demonstrating normal sinus rhythm at a rate of 65. Essentially no acute changes;;\par \par In summary this 75-year-old gentleman has a history of chest mild hyperlipidemia and carotid plaquing with stable cardiac pattern at this time despite having a difficult previous several months with corona virus infection and pneumonitis, undergoing spinal surgery and having a bout of diverticulitis for which he has been recuperating nicely;\par \par Do not feel there is any significant cardiac issues at this time and no evidence of any cardiac enlargement appreciated;\par \par \par Plan:\par \par Patient reassured;\par \par No additional cardiac workup indicated at this time\par \par Will continue to monitor patient's cardiac status and may repeat echocardiogram later this summer or early fall;\par \par return to office within 3-4 months or;

## 2021-04-02 NOTE — REVIEW OF SYSTEMS
[Shortness Of Breath] : shortness of breath [Joint Stiffness] : joint stiffness [Lower Back Pain] : lower back pain [Negative] : Heme/Lymph

## 2021-04-02 NOTE — HISTORY OF PRESENT ILLNESS
[FreeTextEntry1] : He is tolerating his current medical regimen without difficulty;\par \par Last transthoracic echo from September 28, 2020 showed normal cardiac chamber sizes with normal systolic function of the left ventricle and normal ejection fraction in the range of 60%; Mild thickening of the mitral leaflets with only trace MR and mild calcification of the aortic valve otherwise functioning normally;

## 2021-05-25 ENCOUNTER — APPOINTMENT (OUTPATIENT)
Dept: PULMONOLOGY | Facility: CLINIC | Age: 76
End: 2021-05-25
Payer: COMMERCIAL

## 2021-05-25 VITALS
WEIGHT: 168 LBS | SYSTOLIC BLOOD PRESSURE: 110 MMHG | BODY MASS INDEX: 23.43 KG/M2 | OXYGEN SATURATION: 98 % | DIASTOLIC BLOOD PRESSURE: 60 MMHG | HEART RATE: 69 BPM

## 2021-05-25 VITALS — RESPIRATION RATE: 16 BRPM

## 2021-05-25 PROCEDURE — 99072 ADDL SUPL MATRL&STAF TM PHE: CPT

## 2021-05-25 PROCEDURE — 99214 OFFICE O/P EST MOD 30 MIN: CPT

## 2021-05-25 NOTE — HISTORY OF PRESENT ILLNESS
[TextBox_4] : The patient continues to complain of shortness of breath. He has a feeling that "his lungs are burning". He had a followup CT scan of the chest done last week but the report is not ready yet. He has not been vaccinated for Covid 19  yet.His back is still bothering him after surgery and he is going for another MRI.

## 2021-05-25 NOTE — ASSESSMENT
[FreeTextEntry1] : Patient is status post code 19 pneumonia. He has ongoing CT findings with ongoing shortness of breath. This may represent the onset of interstitial disease. It is also possible that some of these findings will continue to resolve. Shortness of breath continues. This could be debility from COVID 19 infection with shortness of breath on a demand basis from his orthopedic issues. I will get pulmonary function studies on him and we'll see him back after that. We should have an official report on the CT scan by then.

## 2021-06-17 ENCOUNTER — APPOINTMENT (OUTPATIENT)
Dept: NEUROSURGERY | Facility: CLINIC | Age: 76
End: 2021-06-17
Payer: COMMERCIAL

## 2021-06-17 VITALS
WEIGHT: 165 LBS | HEART RATE: 86 BPM | HEIGHT: 71 IN | SYSTOLIC BLOOD PRESSURE: 151 MMHG | BODY MASS INDEX: 23.1 KG/M2 | DIASTOLIC BLOOD PRESSURE: 72 MMHG | OXYGEN SATURATION: 98 % | TEMPERATURE: 98.3 F

## 2021-06-17 PROCEDURE — 99072 ADDL SUPL MATRL&STAF TM PHE: CPT

## 2021-06-17 PROCEDURE — 99203 OFFICE O/P NEW LOW 30 MIN: CPT

## 2021-06-22 NOTE — REVIEW OF SYSTEMS
[Feeling Tired] : feeling tired [As Noted in HPI] : as noted in HPI [Leg Weakness] : leg weakness [Numbness] : numbness [Tingling] : tingling [Difficulty Walking] : difficulty walking [Negative] : Heme/Lymph [FreeTextEntry9] : lower back pain

## 2021-06-22 NOTE — ASSESSMENT
[FreeTextEntry1] : Mr. Whitehead presents with above history and imaging.  He has exam findings and symptoms consistent with neurogenic claudication.  In agreement, he may be a good surgical candidate for decompresion and spinal fusion.\par Would need a CT lumbar spine to confirm levels.  I would likely plan for an L5-S1 PLIF with extension of hardware to L4 given the patient's chronic L5 compression fracture and VB height loss.\par \par Plan:\par Patient will contact our office if he wishes to schedule surgery with our office\par \par benefit: hopeful decompression, hopeful stabilization of the spine, hopeful improvement in symptoms, hopeful prevention of progression of deficit \par alternative: no surgical intervention; continued conservative therapy (PT, pain management)\par risks: bleeding, infection, CSF leak, failure of procedure or instrumentation, pseudoarthrosis, adjacent level degeneration, need for re-operation, worsening pain, seizure, stroke, coma, death, DVT, PE, MI, PNA, UTI, difficulty/failure to intubate or extubate, new or worsening numbness, tingling, weakness, paralysis, sensory changes, difficulty/inability to ambulate, sexual dysfunction, incontinence\par \par I have seen and examined the patient along with my nurse practitioner, Andreia Mathews .  I have personally determined the assessment and plan of care based on today's encounter.\par

## 2021-06-22 NOTE — HISTORY OF PRESENT ILLNESS
[> 3 months] : more  than 3 months [Pain] : pain [Weakness] : weakness [Numbness] : numbness [Worsening] : worsening [Intermit.] : ~He/She~ states the symptoms seem to be intermittent [Bending] : worsened by bending [Lifting] : worsened by lifting [Walking] : worsened by walking [Recumbency] : relieved by recumbency [Rest] : relieved by rest [FreeTextEntry1] : lower back and LLE pain  [de-identified] : JESUS GRAY is a 75 year old male presents for initial neurosurgical evaluation/second opinion of lumbar spinal stenosis. Past medical  history for mixed hyperlipidemia, mild hypertension and carotid plaquing stenosis.      Patient offers history of L5 compression fracture at age 21 and chronic LBP since that time. Past surgical history includes left L5- S1 laminectomy 1/2021 with Dr. Amaya.  Patient endorses lower back pain in a band like distribution with radiation to left lower extremity.  It is described as numbness/tingling and weakness.    Pain intensity 6/10.   Denies any saddle anesthesia, urinary or bowel incontinence.\par He is ambulating with the assistance of a cane.  He states the pain makes it difficult to ambulate.  He has been involved in physical therapy with minimal improvement.  He is under the care of Dr. Dejesus.  He received a lumbar RONALD on 6/14/2021, as of yet he does not endorse improvement of symptoms.   He was proposed by Dr. Bonilla Amaya a lumbar spinal fusion.  He wishes to consider another opinion in how to proceed.  \par  [Ataxia] : no ataxia [Incontinence] : no incontinence [Loss of Dexterity] : good dexterity [Urinary Ret.] : no urinary retention

## 2021-06-22 NOTE — PHYSICAL EXAM
[General Appearance - Alert] : alert [General Appearance - In No Acute Distress] : in no acute distress [Oriented To Time, Place, And Person] : oriented to person, place, and time [Impaired Insight] : insight and judgment were intact [Affect] : the affect was normal [Person] : oriented to person [Place] : oriented to place [Time] : oriented to time [Short Term Intact] : short term memory intact [Concentration Intact] : normal concentrating ability [Fluency] : fluency intact [Comprehension] : comprehension intact [Vocabulary] : adequate range of vocabulary [Cranial Nerves Optic (II)] : visual acuity intact bilaterally,  pupils equal round and reactive to light [Cranial Nerves Trigeminal (V)] : facial sensation intact symmetrically [Cranial Nerves Oculomotor (III)] : extraocular motion intact [Cranial Nerves Facial (VII)] : face symmetrical [Cranial Nerves Glossopharyngeal (IX)] : tongue and palate midline [Cranial Nerves Hypoglossal (XII)] : there was no tongue deviation with protrusion [Cranial Nerves Accessory (XI - Cranial And Spinal)] : head turning and shoulder shrug symmetric [Motor Strength] : muscle strength was normal in all four extremities [Motor Tone] : muscle tone was normal in all four extremities [No Muscle Atrophy] : normal bulk in all four extremities [Sensation Tactile Decrease] : light touch was intact [Balance] : balance was intact [2+] : Patella left 2+ [No Visual Abnormalities] : no visible abnormailities [Antalgic] : antalgic [Remote Intact] : remote memory impaired [Span Intact] : the attention span was decreased [Current Events] : inadequate knowledge of current events [Past History] : inadequate knowledge of personal past history [Past-pointing] : there was no past-pointing [Tremor] : no tremor present [FreeTextEntry8] : ambulates with the assistance of a cane

## 2021-06-22 NOTE — DATA REVIEWED
[de-identified] : MRI lumbar spine reveals post surgical changes at left L5- S1.  There is evidence of multilevel spondylosis with bulging discs and facet hypertrophy.

## 2021-06-22 NOTE — REASON FOR VISIT
[New Patient Visit] : a new patient visit [Referred By: _________] : Patient was referred by TIM [Spouse] : spouse [FreeTextEntry1] : lumbar spinal stenosis/spondylolisthesis

## 2021-06-26 ENCOUNTER — APPOINTMENT (OUTPATIENT)
Dept: DISASTER EMERGENCY | Facility: CLINIC | Age: 76
End: 2021-06-26

## 2021-06-28 LAB — SARS-COV-2 N GENE NPH QL NAA+PROBE: NOT DETECTED

## 2021-06-29 ENCOUNTER — APPOINTMENT (OUTPATIENT)
Dept: PULMONOLOGY | Facility: CLINIC | Age: 76
End: 2021-06-29
Payer: COMMERCIAL

## 2021-06-29 VITALS — HEIGHT: 68.5 IN | BODY MASS INDEX: 24.57 KG/M2 | TEMPERATURE: 97.6 F | WEIGHT: 164 LBS

## 2021-06-29 VITALS — OXYGEN SATURATION: 98 % | SYSTOLIC BLOOD PRESSURE: 132 MMHG | DIASTOLIC BLOOD PRESSURE: 66 MMHG | HEART RATE: 61 BPM

## 2021-06-29 VITALS — RESPIRATION RATE: 16 BRPM

## 2021-06-29 DIAGNOSIS — U07.1 COVID-19: ICD-10-CM

## 2021-06-29 DIAGNOSIS — J12.82 COVID-19: ICD-10-CM

## 2021-06-29 DIAGNOSIS — Z01.811 ENCOUNTER FOR PREPROCEDURAL RESPIRATORY EXAMINATION: ICD-10-CM

## 2021-06-29 PROCEDURE — 99214 OFFICE O/P EST MOD 30 MIN: CPT | Mod: 25

## 2021-06-29 PROCEDURE — 94727 GAS DIL/WSHOT DETER LNG VOL: CPT

## 2021-06-29 PROCEDURE — 99072 ADDL SUPL MATRL&STAF TM PHE: CPT

## 2021-06-29 PROCEDURE — 94729 DIFFUSING CAPACITY: CPT

## 2021-06-29 PROCEDURE — 85018 HEMOGLOBIN: CPT | Mod: QW

## 2021-06-29 PROCEDURE — 94010 BREATHING CAPACITY TEST: CPT

## 2021-06-29 NOTE — CONSULT LETTER
[Dear  ___] : Dear  [unfilled], [Courtesy Letter:] : I had the pleasure of seeing your patient, [unfilled], in my office today. [Please see my note below.] : Please see my note below. [Consult Closing:] : Thank you very much for allowing me to participate in the care of this patient.  If you have any questions, please do not hesitate to contact me. [Sincerely,] : Sincerely, [FreeTextEntry3] : Veronica Khan MD FCCP\par D-ABSM\par ABIM board certified in  Pulmonary diseases, Sleep medicine\par Internal medicine\par  [DrGricelda  ___] : Dr. DUMONT [___] : [unfilled]

## 2021-06-29 NOTE — ASSESSMENT
[FreeTextEntry1] : The patient has recovered from Covid 19 infection. He has normal lung function. No further followup is necessary from a pulmonary point of view.\par \par The patient is cleared for the proposed surgery from a pulmonary point of view without any special precautions.

## 2021-06-29 NOTE — REASON FOR VISIT
[Follow-Up] : a follow-up visit [Abnormal CXR/ Chest CT] : an abnormal CXR/ chest CT [Shortness of Breath] : shortness of breath [Pre-op Risk Stratification] : pre-op risk stratification [Spouse] : spouse

## 2021-06-29 NOTE — HISTORY OF PRESENT ILLNESS
[TextBox_4] : Patient has some mild dyspnea on exertion. Going for laminectomy on July 20 at [facility]. Denies any cough or wheeze.

## 2021-07-01 ENCOUNTER — APPOINTMENT (OUTPATIENT)
Dept: DISASTER EMERGENCY | Facility: OTHER | Age: 76
End: 2021-07-01

## 2021-07-07 ENCOUNTER — APPOINTMENT (OUTPATIENT)
Dept: CARDIOLOGY | Facility: CLINIC | Age: 76
End: 2021-07-07
Payer: COMMERCIAL

## 2021-07-07 ENCOUNTER — NON-APPOINTMENT (OUTPATIENT)
Age: 76
End: 2021-07-07

## 2021-07-07 VITALS
RESPIRATION RATE: 16 BRPM | HEIGHT: 68.5 IN | HEART RATE: 68 BPM | WEIGHT: 167 LBS | BODY MASS INDEX: 25.02 KG/M2 | DIASTOLIC BLOOD PRESSURE: 68 MMHG | SYSTOLIC BLOOD PRESSURE: 120 MMHG

## 2021-07-07 PROCEDURE — 93000 ELECTROCARDIOGRAM COMPLETE: CPT

## 2021-07-07 PROCEDURE — 99214 OFFICE O/P EST MOD 30 MIN: CPT

## 2021-07-07 PROCEDURE — 99072 ADDL SUPL MATRL&STAF TM PHE: CPT

## 2021-07-07 NOTE — PHYSICAL EXAM
[Well Developed] : well developed [Well Nourished] : well nourished [No Acute Distress] : no acute distress [Normal Conjunctiva] : normal conjunctiva [Normal Venous Pressure] : normal venous pressure [No Carotid Bruit] : no carotid bruit [Normal S1, S2] : normal S1, S2 [No Rub] : no rub [No Gallop] : no gallop [Clear Lung Fields] : clear lung fields [Good Air Entry] : good air entry [No Respiratory Distress] : no respiratory distress  [Soft] : abdomen soft [Non Tender] : non-tender [No Masses/organomegaly] : no masses/organomegaly [Normal Bowel Sounds] : normal bowel sounds [Abnormal Gait] : abnormal gait [No Edema] : no edema [No Cyanosis] : no cyanosis [No Clubbing] : no clubbing [No Varicosities] : no varicosities [No Rash] : no rash [No Skin Lesions] : no skin lesions [Moves all extremities] : moves all extremities [No Focal Deficits] : no focal deficits [Normal Speech] : normal speech [Alert and Oriented] : alert and oriented [Normal memory] : normal memory [de-identified] : regular rhythm, grade 1/6 systolic murmur [de-identified] : ambulating with cane today; abnormal gait;

## 2021-07-07 NOTE — ASSESSMENT
[FreeTextEntry1] : EKG shows normal sinus rhythm at a rate of 68; early R-wave transition V1 to V2. No acute changes;\par \par In summary the patient is a 75-year-old gentleman who is facing the lower spinal surgery for chronic back pain in the near future. He has had a stable cardiac pattern ith history of mild to moderate carotid plaquing stenosis, and otherwise stable EKG and asymptomatic from the cardiac standpoint;\par \par Plan:\par \par \par Based upon stable cardiac pattern there is no cardiac contraindication for him to undergo the proposed spinal surgical procedure;\par \par okay to continue current medical regimen\par \par Follow up in this office within; 4 months or p.r.n.\par \par we'll continue to monitor and track a history of carotid plaquing again in the future;\par \par \par \par \par

## 2021-07-07 NOTE — REASON FOR VISIT
[Hyperlipidemia] : hyperlipidemia [Hypertension] : hypertension [Carotid, Aortic and Peripheral Vascular Disease] : carotid, aortic and peripheral vascular disease [FreeTextEntry1] : The patient is a 75-year-old gentleman who has known history for mild hypertension mild to moderate carotid plaquing stenosis (left greater than the right)-presents back to the office today for general cardiac checkup;\par \par He is also asking for cardiac clearance in anticipation of undergoing additional back surgery in the near future for the lower spine; this is tentatively scheduled for July 20, 2021 with Dr. Bonilla Amaya at [facility];\par \par Fortunately, he has had no significant chest pain, shortness of breath, palpitations, dizziness or syncope;

## 2021-07-09 ENCOUNTER — APPOINTMENT (OUTPATIENT)
Dept: ORTHOPEDIC SURGERY | Facility: CLINIC | Age: 76
End: 2021-07-09

## 2021-07-13 ENCOUNTER — APPOINTMENT (OUTPATIENT)
Dept: CARDIOLOGY | Facility: CLINIC | Age: 76
End: 2021-07-13

## 2021-09-08 ENCOUNTER — APPOINTMENT (OUTPATIENT)
Dept: CARDIOLOGY | Facility: CLINIC | Age: 76
End: 2021-09-08
Payer: COMMERCIAL

## 2021-09-08 PROCEDURE — 93306 TTE W/DOPPLER COMPLETE: CPT

## 2021-09-08 PROCEDURE — 99072 ADDL SUPL MATRL&STAF TM PHE: CPT

## 2021-09-08 PROCEDURE — 93880 EXTRACRANIAL BILAT STUDY: CPT

## 2021-09-17 ENCOUNTER — APPOINTMENT (OUTPATIENT)
Dept: ORTHOPEDIC SURGERY | Facility: CLINIC | Age: 76
End: 2021-09-17
Payer: COMMERCIAL

## 2021-09-17 VITALS
HEART RATE: 59 BPM | HEIGHT: 68.5 IN | BODY MASS INDEX: 25.02 KG/M2 | DIASTOLIC BLOOD PRESSURE: 72 MMHG | WEIGHT: 167 LBS | SYSTOLIC BLOOD PRESSURE: 147 MMHG

## 2021-09-17 PROCEDURE — 99072 ADDL SUPL MATRL&STAF TM PHE: CPT

## 2021-09-17 PROCEDURE — 99204 OFFICE O/P NEW MOD 45 MIN: CPT

## 2021-09-17 PROCEDURE — 73502 X-RAY EXAM HIP UNI 2-3 VIEWS: CPT | Mod: LT

## 2021-09-17 NOTE — CONSULT LETTER
[Dear  ___] : Dear  [unfilled], [Consult Letter:] : I had the pleasure of evaluating your patient, [unfilled]. [Please see my note below.] : Please see my note below. [Consult Closing:] : Thank you very much for allowing me to participate in the care of this patient.  If you have any questions, please do not hesitate to contact me. [Sincerely,] : Sincerely, [FreeTextEntry2] : KAELYN RICHARDSON MD\par  [FreeTextEntry3] : Celso Ibanez MD\par Chief of Joint Replacement\par [facility] \par [facility]\par \par

## 2021-09-17 NOTE — DISCUSSION/SUMMARY
[de-identified] : 75 year old male presenting with mild left hip arthritis. Clinically he demonstrates tenderness near the SI joint.  Although he has some early arthritis I do not think he is having much pain related to this at this time.  He does have gluteal tendinopathy seen on MRI which could be contributing to his discomfort but I think the majority of his pain is radiculopathy.   I recommend a course of physical therapy for his gluteal tendinopathy.  A script was provided today in the office. I also recommended further evaluation of his spine. All questions answered, understanding verbalized. Patient in agreement with plan of care. This was explained in the presence of his daughter.

## 2021-09-17 NOTE — HISTORY OF PRESENT ILLNESS
[de-identified] : JESUS GRAY is a 75 year old male who presents for initial evaluation of left hip and left sided posterior back pain. He states the pain is constant and achy. He states the pain occurs when sitting and also with walking. The pain is worse with walking.  He reports having a discectomy and laminectomy earlier this year. He has had an injection with Dr. Dejesus for left hip bursitis.  He denies any groin pain.  Pain is located in the left buttock and radiates down the lateral left thigh.  It also radiates to his left lower lumbar region.

## 2021-09-17 NOTE — PHYSICAL EXAM
[de-identified] : The patient appears well nourished  and in no apparent distress.  The patient is alert and oriented to person, place, and time.   Affect and mood appear normal. The head is normocephalic and atraumatic.  The eyes reveal normal sclera and extra ocular muscles are intact. The tongue is midline with no apparent lesions.  Skin shows normal turgor with no evidence of eczema or psoriasis.  No respiratory distress noted.  Sensation grossly intact.\par  [de-identified] : Exam of the left hip shows hip flexion of 100 degrees, hip external rotation of 30 degrees with gluteal region pain, hip internal rotation of 20 degrees. 5/5 motor strength bilaterally distally. Sensation intact distally. Buttock pain with straight leg raise. No groin pain. Minimal trochanteric tenderness. Pain mostly near SI joint.  [de-identified] : X-ray: AP view of the pelvis and 2 views of the left hip demonstrate a mild joint space narrowing, hip arthritis. \par \par MRI left hip obtained on 6/19/2021, results reviewed today as reported in the chart, demonstrates medial hip arthrosis with acetabular over-coverage. Fraying and degeneration of the labrum with joint effusion and no fracture. Gluteal tendinopathy\par

## 2021-09-17 NOTE — ADDENDUM
[FreeTextEntry1] : This note was authored by Nataly Murcia working as a medical scribe for Dr. Celso Ibanez. The note was reviewed, edited, and revised by Dr. Celso Ibanez whom is in agreement with the exam findings, imaging findings, and treatment plan. 09/17/2021\par

## 2021-09-21 ENCOUNTER — APPOINTMENT (OUTPATIENT)
Dept: CARDIOLOGY | Facility: CLINIC | Age: 76
End: 2021-09-21
Payer: COMMERCIAL

## 2021-09-21 ENCOUNTER — NON-APPOINTMENT (OUTPATIENT)
Age: 76
End: 2021-09-21

## 2021-09-21 VITALS
RESPIRATION RATE: 16 BRPM | WEIGHT: 174 LBS | HEART RATE: 56 BPM | BODY MASS INDEX: 26.07 KG/M2 | DIASTOLIC BLOOD PRESSURE: 66 MMHG | HEIGHT: 68.5 IN | SYSTOLIC BLOOD PRESSURE: 126 MMHG

## 2021-09-21 DIAGNOSIS — I65.22 OCCLUSION AND STENOSIS OF LEFT CAROTID ARTERY: ICD-10-CM

## 2021-09-21 PROCEDURE — 99072 ADDL SUPL MATRL&STAF TM PHE: CPT

## 2021-09-21 PROCEDURE — 93000 ELECTROCARDIOGRAM COMPLETE: CPT

## 2021-09-21 PROCEDURE — 99214 OFFICE O/P EST MOD 30 MIN: CPT

## 2021-09-21 NOTE — REVIEW OF SYSTEMS
[Joint Pain] : joint pain [Joint Stiffness] : joint stiffness [Hip Problem] : hip problems [Hip Pain] : hip pain [Lower Back Pain] : lower back pain [Negative] : Heme/Lymph

## 2021-09-21 NOTE — ASSESSMENT
[FreeTextEntry1] : EKG shows sinus bradycardia at a rate of 56. Slight left axis deviation. No acute changes;\par \par In summary this 75-year-old white male has a history for moderate carotid plaquing stenosis, mild to moderate aortic stenosis, and heart murmur with hyperlipidemia and otherwise stable cardiac pattern;\par \par Plan:\par \par in summary the patient is a 75-year-old gentleman who has a history of; artery carotid plaquing stenosis and mild to moderate aortic stenosis with stable cardiac pattern\par \par Plan:\par \par Patient recommended to increase; atorvastatin to 20 mg p.o. daily and continue other medications the same\par \par ; No additional cardiac workup indicated at this time\par \par Followup in this office within 4-5 months ;\par \par Continued timely checkups and laboratory blood tests with primary care recommended;

## 2021-09-21 NOTE — REASON FOR VISIT
[Arrhythmia/ECG Abnorrmalities] : arrhythmia/ECG abnormalities [Structural Heart and Valve Disease] : structural heart and valve disease [Hyperlipidemia] : hyperlipidemia [Hypertension] : hypertension [Carotid, Aortic and Peripheral Vascular Disease] : carotid, aortic and peripheral vascular disease [FreeTextEntry3] : KAELYN RICHARDSON [FreeTextEntry1] : The patient is a 75-year-old white male with known history for hypertension, hyperlipidemia and moderate carotid plaquing stenosis who presents back to the office today for general cardiac checkup;\par \par Patient underwent recent cardiac testing in here to discuss those results;\par \par He has not had any significant symptoms for chest pain, shortness of breath, palpitations or dizziness;

## 2021-09-21 NOTE — HISTORY OF PRESENT ILLNESS
[FreeTextEntry1] : Unfortunately, he is still suffering from several arthritides, including now bilateral hip pain and had recuperated from the lower spinal surgery from the summer but still reports having some ongoing discomfort despite physical therapy;\par \par Now he was recommended for physical therapy by the orthopedist for his hips;\par \par Carotid duplex study performed 9/8/21 demonstrates a large calcified plaque on the left side with luminal encroachment in the range of 50-69%. Moderate to large heterogeneous plaque on the right side with normal flow; (unchanged from previous study from 2020);\par \par Transthoracic echocardiogram from 9/8/21 shows preserved cardiac chamber sizes with normal systolic function of the left ventricle and normal ejection fraction in the range of 60-65%. There was trace MR and mild to moderate aortic stenosis;\par \par

## 2021-09-21 NOTE — PHYSICAL EXAM
[Well Developed] : well developed [Well Nourished] : well nourished [No Acute Distress] : no acute distress [Normal Conjunctiva] : normal conjunctiva [Normal Venous Pressure] : normal venous pressure [No Carotid Bruit] : no carotid bruit [Normal S1, S2] : normal S1, S2 [No Rub] : no rub [No Gallop] : no gallop [Clear Lung Fields] : clear lung fields [Good Air Entry] : good air entry [No Respiratory Distress] : no respiratory distress  [Soft] : abdomen soft [Non Tender] : non-tender [No Masses/organomegaly] : no masses/organomegaly [Normal Bowel Sounds] : normal bowel sounds [Normal Gait] : normal gait [No Edema] : no edema [No Cyanosis] : no cyanosis [No Clubbing] : no clubbing [No Varicosities] : no varicosities [No Rash] : no rash [No Skin Lesions] : no skin lesions [Moves all extremities] : moves all extremities [No Focal Deficits] : no focal deficits [Normal Speech] : normal speech [Alert and Oriented] : alert and oriented [Normal memory] : normal memory [de-identified] : regular rhythm, grade 1-2/6 systolic murmur

## 2021-10-01 ENCOUNTER — APPOINTMENT (OUTPATIENT)
Dept: ORTHOPEDIC SURGERY | Facility: CLINIC | Age: 76
End: 2021-10-01
Payer: COMMERCIAL

## 2021-10-01 VITALS
DIASTOLIC BLOOD PRESSURE: 77 MMHG | HEART RATE: 57 BPM | SYSTOLIC BLOOD PRESSURE: 153 MMHG | BODY MASS INDEX: 26.07 KG/M2 | WEIGHT: 174 LBS | HEIGHT: 68.5 IN

## 2021-10-01 DIAGNOSIS — M70.62 TROCHANTERIC BURSITIS, LEFT HIP: ICD-10-CM

## 2021-10-01 DIAGNOSIS — M25.559 PAIN IN UNSPECIFIED HIP: ICD-10-CM

## 2021-10-01 DIAGNOSIS — M54.5 LOW BACK PAIN: ICD-10-CM

## 2021-10-01 PROCEDURE — 72100 X-RAY EXAM L-S SPINE 2/3 VWS: CPT

## 2021-10-01 PROCEDURE — 99214 OFFICE O/P EST MOD 30 MIN: CPT

## 2021-10-01 PROCEDURE — 99072 ADDL SUPL MATRL&STAF TM PHE: CPT

## 2021-10-01 NOTE — ADDENDUM
[FreeTextEntry1] : Documented by Josemanuel Billingsley acting as a scribe for Dr. Tex Belle on 10/01/2021. All medical record entries made by the Scribe were at my, Dr. Tex Belle, direction and personally dictated by me on 10/01/2021 . I have reviewed the chart and agree that the record accurately reflects my personal performance of the history, physical exam, assessment and plan. I have also personally directed, reviewed, and agreed with the chart.

## 2021-10-01 NOTE — HISTORY OF PRESENT ILLNESS
[de-identified] : 75 year old M presents for an initial evaluation of mainly left sided lower lumbar pain radiating into the left buttock and lateral thigh, this pain has been stable since last January. Exacerbating factors include walking although it is present when he sits as well. He states he is S/p emergent diskectomy at [facility] in  after a slip and fall and laminectomy , he states these surgeries improved his LE sciatica but not his high sciatica / left hip pain. He has had injections for hip bursitis with Dr. Dejesus. Patient was seen by us in 2015 but had enough relief with injection under Dr. Dejesus that he didn’t pursue surgery at that time.  [Ataxia] : no ataxia [Incontinence] : no incontinence [Loss of Dexterity] : good dexterity [Urinary Ret.] : no urinary retention

## 2021-10-01 NOTE — DISCUSSION/SUMMARY
[de-identified] : We talked about the nature of the condition and treatment options. Anticipatory guidance regarding soft tissue etiology was given. I believe this patients complaints are multifactorial in nature. I have recommended that the patient continue with a conservative treatment plan. \par Patient has been referred to physical therapy for decreased pain modalities, stretching and strengthening modalities, soft tissue modalities, and physical modalities. Patient has been referred to pain management for assessment regarding pain control and gluteal / greater trochanteric injections. Patient to follow up on a PRN basis. \par \par Prior to appointment and during encounter with patient extensive medical records were reviewed including but not limited to, hospital records, out patient records, imaging results, and lab data. During this appointment the patient was examined, diagnoses were discussed and explained in a face to face manner. In addition extensive time was spent reviewing aforementioned diagnostic studies. Counseling including abnormal image results, differential diagnoses, treatment options, risk and benefits, lifestyle changes, current condition, and current medications was performed. Patient's comments, questions, and concerns were address and patient verbalized understanding. Based on this patient's presentation at our office, which is an orthopedic spine surgeon's office, this patient inherently / intrinsically has a risk, however minute, of developing  issues such as Cauda equina syndrome, bowel and bladder changes, or progression of motor or neurological deficits such as paralysis which may be permanent.

## 2021-10-01 NOTE — PHYSICAL EXAM
[Cane] : ambulates with cane [Poor Appearance] : well-appearing [Acute Distress] : not in acute distress [Obese] : not obese [de-identified] : CONSTITUTIONAL: The patient is a very pleasant individual who is well-nourished and who appears stated age.\par PSYCHIATRIC: The patient is alert and oriented X 3 and in no apparent distress, and participates with orthopedic evaluation well.\par HEAD: Atraumatic and is nonsyndromic in appearance.\par EENT: No visible thyromegaly, EOMI.\par RESPIRATORY: Respiratory rate is regular, not dyspneic on examination.\par LYMPHATICS: There is no inguinal lymphadenopathy\par INTEGUMENTARY: Skin is clean, dry, and intact about the bilateral lower extremities and lumbar spine.\par VASCULAR: There is brisk capillary refill about the bilateral lower extremities.\par NEUROLOGIC: There are no pathologic reflexes. There is no decrease in sensation of the bilateral lower extremities on Wartenberg pinwheel examination. Deep tendon reflexes are well maintained at 2+/4 of the bilateral lower extremities and are symmetric.\par MUSCULOSKELETAL: There is no visible muscular atrophy. Manual motor strength is well maintained in the bilateral lower extremities. Range of motion of lumbar spine is well maintained. The patient ambulates in a non-myelopathic manner. Negative tension sign and straight leg raise bilaterally. Quad extension, ankle dorsiflexion, EHL, plantar flexion, and ankle eversion are well preserved. Normal secondary orthopaedic exam of bilateral hips, greater trochanteric area, knees and ankles. No pain with internal or external rotation of the bilateral hips. Left sided Greater trochanteric bursitis, gluteal tendinopathy. [de-identified] : Xray of the lumbar spine taken 10/01/2021 demonstrates L5 laminectomy with a subtle L5 S1 spondylolisthesis. \par \par 2 view Xray of the AP pelvis / left hip taken 09- demonstrates a mild joint space narrowing, hip arthritis. \par \par MRI left hip obtained on 6/19/2021 demonstrates medial hip arthrosis with acetabular over-coverage. Fraying and degeneration of the labrum with joint effusion and no fracture. Gluteal tendinopathy\par \par Previous lumbar Xray from 2015 was reviewed which demonstrates chronic vertebral body height loss at L5. \par \par MRI of the lumbar spine taken at [facility]  demonstrates L5-S1 degenerative disc disease with a grade 1 spondylolisthesis, there is evidence of an L5 diskectomy on the left.

## 2021-10-06 PROBLEM — U07.1 PNEUMONIA DUE TO COVID-19 VIRUS: Status: ACTIVE | Noted: 2021-03-19

## 2021-11-12 ENCOUNTER — APPOINTMENT (OUTPATIENT)
Dept: ORTHOPEDIC SURGERY | Facility: CLINIC | Age: 76
End: 2021-11-12

## 2022-01-10 ENCOUNTER — NON-APPOINTMENT (OUTPATIENT)
Age: 77
End: 2022-01-10

## 2022-01-10 ENCOUNTER — APPOINTMENT (OUTPATIENT)
Dept: CARDIOLOGY | Facility: CLINIC | Age: 77
End: 2022-01-10
Payer: COMMERCIAL

## 2022-01-10 VITALS
DIASTOLIC BLOOD PRESSURE: 68 MMHG | HEIGHT: 68.5 IN | HEART RATE: 67 BPM | SYSTOLIC BLOOD PRESSURE: 142 MMHG | WEIGHT: 167 LBS | BODY MASS INDEX: 25.02 KG/M2 | RESPIRATION RATE: 16 BRPM

## 2022-01-10 PROCEDURE — 93000 ELECTROCARDIOGRAM COMPLETE: CPT

## 2022-01-10 PROCEDURE — 99072 ADDL SUPL MATRL&STAF TM PHE: CPT

## 2022-01-10 PROCEDURE — 99214 OFFICE O/P EST MOD 30 MIN: CPT

## 2022-01-10 RX ORDER — LOSARTAN POTASSIUM AND HYDROCHLOROTHIAZIDE 12.5; 5 MG/1; MG/1
50-12.5 TABLET ORAL DAILY
Qty: 90 | Refills: 1 | Status: DISCONTINUED | COMMUNITY
Start: 2021-11-22 | End: 2022-01-10

## 2022-01-10 NOTE — HISTORY OF PRESENT ILLNESS
[FreeTextEntry1] : Patient states she's been taking his medications regularly;\par \par Recent CT scan of the abdomen and pelvis suggested some heavy atherosclerotic changes of the aorta noted;\par \par

## 2022-01-10 NOTE — REASON FOR VISIT
[Arrhythmia/ECG Abnorrmalities] : arrhythmia/ECG abnormalities [Structural Heart and Valve Disease] : structural heart and valve disease [Hyperlipidemia] : hyperlipidemia [Hypertension] : hypertension [Carotid, Aortic and Peripheral Vascular Disease] : carotid, aortic and peripheral vascular disease [FreeTextEntry3] : MILLI Abbott [FreeTextEntry1] : The patient is a 76-year-old white male with known history for at least moderate carotid plaquing stenosis on the left, hypertension and hyperlipidemia who presents back to the office for general cardiac checkup today;\par \par Patient reports he's been having some new somatic complaints with periodic loss of memory and reported "confusion", for which he cannot particularly articulate;\par \par He seems to feel that some of the symptoms may have been exacerbated after switching from lisinopril to losartan because of possible allergy in late November;

## 2022-01-10 NOTE — REVIEW OF SYSTEMS
[Confusion] : confusion was observed [Memory Lapses Or Loss] : memory lapses or loss [Negative] : Heme/Lymph

## 2022-01-10 NOTE — PHYSICAL EXAM
[Well Developed] : well developed [Well Nourished] : well nourished [No Acute Distress] : no acute distress [Normal Conjunctiva] : normal conjunctiva [Normal Venous Pressure] : normal venous pressure [No Carotid Bruit] : no carotid bruit [Normal S1, S2] : normal S1, S2 [No Rub] : no rub [No Gallop] : no gallop [Clear Lung Fields] : clear lung fields [Good Air Entry] : good air entry [No Respiratory Distress] : no respiratory distress  [Soft] : abdomen soft [Non Tender] : non-tender [No Masses/organomegaly] : no masses/organomegaly [Normal Bowel Sounds] : normal bowel sounds [Normal Gait] : normal gait [No Edema] : no edema [No Cyanosis] : no cyanosis [No Clubbing] : no clubbing [No Varicosities] : no varicosities [No Rash] : no rash [No Skin Lesions] : no skin lesions [Moves all extremities] : moves all extremities [No Focal Deficits] : no focal deficits [Normal Speech] : normal speech [Alert and Oriented] : alert and oriented [Normal memory] : normal memory [de-identified] : regular rhythm, grade 2/6 systolic murmur

## 2022-01-10 NOTE — ASSESSMENT
[FreeTextEntry1] : EKG shows normal sinus rhythm at a rate of 67;  no acute changes noted;\par \par In summary, this 76-year-old gentleman has a history for hypertension and hyperlipidemia with moderate carotid plaquing stenosis and early atherosclerotic changes of the aorta on recent CT of the abdomento his headrecent complaints of intermittent feelings of confusion in memory loss for which he is blaming on some of his medication possibly;\par \par Plan:\par \par I recommended changing atorvastatin to rosuvastatin 10 mg p.o. daily as directed;\par \par Recommend changing losartan/HCT to amlodipine 5 mg p.o. daily; \par \par discussed recommendation for neurologic evaluation in the near future; as well as primary care;\par \par Followup in this office within 3 months or p.r.n.;

## 2022-02-15 ENCOUNTER — APPOINTMENT (OUTPATIENT)
Dept: CARDIOLOGY | Facility: CLINIC | Age: 77
End: 2022-02-15
Payer: COMMERCIAL

## 2022-02-15 ENCOUNTER — NON-APPOINTMENT (OUTPATIENT)
Age: 77
End: 2022-02-15

## 2022-02-15 VITALS
WEIGHT: 170 LBS | SYSTOLIC BLOOD PRESSURE: 138 MMHG | RESPIRATION RATE: 16 BRPM | DIASTOLIC BLOOD PRESSURE: 70 MMHG | BODY MASS INDEX: 25.47 KG/M2 | HEIGHT: 68.5 IN | HEART RATE: 59 BPM

## 2022-02-15 DIAGNOSIS — R60.0 LOCALIZED EDEMA: ICD-10-CM

## 2022-02-15 PROCEDURE — 93000 ELECTROCARDIOGRAM COMPLETE: CPT

## 2022-02-15 PROCEDURE — 99213 OFFICE O/P EST LOW 20 MIN: CPT

## 2022-02-15 PROCEDURE — 99072 ADDL SUPL MATRL&STAF TM PHE: CPT

## 2022-02-15 RX ORDER — AMLODIPINE BESYLATE 5 MG/1
5 TABLET ORAL DAILY
Qty: 90 | Refills: 3 | Status: DISCONTINUED | COMMUNITY
Start: 2022-01-10 | End: 2022-02-15

## 2022-02-15 NOTE — REASON FOR VISIT
[FreeTextEntry1] : JESUS GRAY is being seen for arrhythmia/ECG abnormalities, structural heart and valve disease, hyperlipidemia, hypertension and carotid, aortic and peripheral vascular disease. \par \par The patient is a 76-year-old white male with known history for at least moderate carotid plaquing stenosis on the left, hypertension and hyperlipidemia who presents back to the office for general cardiac checkup today;\par \par Patient had reported some new somatic complaints with periodic loss of memory and reported "confusion", for which he could not particularly articulate.  Was switched from Atorvastatin to Crestor, reports much improvement in his symptoms;\par \par He had also been switched from Losartan-HCTZ 50-12.5 mg to taking Amlodipine 5 mg due to recently elevated systolics in the 140s to 150s.  Unfortunately, he developed lower extremity ankle/pretibial edema and is here to discuss medication options;\par \par Patient has no other symptoms and feels overall well.  He denies CP, SOB, STEVENS, orthopnea, PND, palpitations, presyncope, syncope.

## 2022-02-15 NOTE — ASSESSMENT
[FreeTextEntry1] : EKG shows normal sinus rhythm at a rate of 67; no acute changes noted;\par \par In summary, this 76-year-old gentleman has a history for hypertension and hyperlipidemia with moderate carotid plaquing stenosis and early atherosclerotic changes of the aorta on recent CT of the abdomen to his head. Prior complaints of intermittent feelings of confusion and memory loss which has improved since switching Atorvastatin for Crestor;\par Unfortunately, he has now developed LE swelling since switching from Losartan-HCTZ 50-12.5 mg to Amlodipine 5 mg for more optimal blood pressures;

## 2022-02-15 NOTE — DISCUSSION/SUMMARY
[FreeTextEntry1] : 1).  Will have patient discontinue  Amlodipine due to LE swelling.  Will empirically have him switch back to Losartan-HCTZ but at an increased dose of 100-12.5 mg daily.\par \par All other cardiac meds remain the same.\par \par 2).  Follow up with PCP (Dr. Abbott) regarding routine checkups and blood work.  Forward all testing/lab work to our office. \par \par 3).  Recommend patient report any untoward symptoms. \par \par 4).  Follow up with Dr. Pond on April 14th or PRN.

## 2022-02-15 NOTE — PHYSICAL EXAM
[Well Developed] : well developed [Well Nourished] : well nourished [No Acute Distress] : no acute distress [Normal Conjunctiva] : normal conjunctiva [Normal Venous Pressure] : normal venous pressure [No Carotid Bruit] : no carotid bruit [Normal S1, S2] : normal S1, S2 [No Rub] : no rub [No Gallop] : no gallop [Murmur] : murmur [Clear Lung Fields] : clear lung fields [Good Air Entry] : good air entry [No Respiratory Distress] : no respiratory distress  [Soft] : abdomen soft [Non Tender] : non-tender [No Masses/organomegaly] : no masses/organomegaly [Normal Bowel Sounds] : normal bowel sounds [Normal Gait] : normal gait [No Cyanosis] : no cyanosis [No Clubbing] : no clubbing [No Varicosities] : no varicosities [No Rash] : no rash [No Skin Lesions] : no skin lesions [Moves all extremities] : moves all extremities [No Focal Deficits] : no focal deficits [Normal Speech] : normal speech [Alert and Oriented] : alert and oriented [Normal memory] : normal memory [de-identified] : Grade II/VI systolic murmur [de-identified] : Trace to 1+ pretibial/ankle edema with right greater than left

## 2022-02-15 NOTE — HISTORY OF PRESENT ILLNESS
[FreeTextEntry1] : He had previously been switched from Lisinopril-HCTZ to Losartan-HCTZ for having chronic nonproductive cough.  Since switching to the ARB and then switching to CCB,  his non-productive cough has not improved and now admits he may have postnasal drip;\par \par Most recent labs (1/25/2022):  \par HgA1C 6.0%\par LDL 59\par Trigs 115\par Creat 0.86\par K 4.4\par AST 30\par ALT 55

## 2022-04-14 ENCOUNTER — NON-APPOINTMENT (OUTPATIENT)
Age: 77
End: 2022-04-14

## 2022-04-14 ENCOUNTER — APPOINTMENT (OUTPATIENT)
Dept: CARDIOLOGY | Facility: CLINIC | Age: 77
End: 2022-04-14
Payer: COMMERCIAL

## 2022-04-14 VITALS
HEIGHT: 68.5 IN | DIASTOLIC BLOOD PRESSURE: 60 MMHG | HEART RATE: 53 BPM | RESPIRATION RATE: 16 BRPM | WEIGHT: 170 LBS | BODY MASS INDEX: 25.47 KG/M2 | SYSTOLIC BLOOD PRESSURE: 113 MMHG

## 2022-04-14 DIAGNOSIS — G62.9 POLYNEUROPATHY, UNSPECIFIED: ICD-10-CM

## 2022-04-14 PROCEDURE — 93000 ELECTROCARDIOGRAM COMPLETE: CPT

## 2022-04-14 PROCEDURE — 99072 ADDL SUPL MATRL&STAF TM PHE: CPT

## 2022-04-14 PROCEDURE — 99214 OFFICE O/P EST MOD 30 MIN: CPT

## 2022-04-14 NOTE — REASON FOR VISIT
[Arrhythmia/ECG Abnorrmalities] : arrhythmia/ECG abnormalities [Structural Heart and Valve Disease] : structural heart and valve disease [Hyperlipidemia] : hyperlipidemia [Hypertension] : hypertension [Carotid, Aortic and Peripheral Vascular Disease] : carotid, aortic and peripheral vascular disease [FreeTextEntry3] : MILLI Abbott [FreeTextEntry1] : The patient is a 76-year-old white male who has a history for heart murmur of mild aortic stenosis, mild hypertension and hyperlipidemia with mild-to moderate carotid plaquing stenosis--who presents back to the office today for general cardiac checkup for;\par \par For the most part, patient denies any significant chest pain, shortness of breath, palpitations or dizziness;

## 2022-04-14 NOTE — HISTORY OF PRESENT ILLNESS
[FreeTextEntry1] : He has been complaining of some symptoms of numbness and tinglingtype discomfort going down both legs at rest and sometimes with activity. He does have a long-standing history of chronic spinal disease and low back syndrome and previous surgeries and had been followed with an orthopedic surgeon in the past;\par \par He also has non-insulin-dependent diabetes however states his A1c has been well controlled;\par \par He does a  limited amount of physical activity and exerciseand does seem to do better when in the pool--for which he did participate at [employer] through the winter;\par \par Last carotid duplex study from September 2021 showed bilateral large calcified and heterogeneous plaquing; normal flow on the right ICA, 50-69% stenosis on the left ICA--which was unchanged from previous of 20/20;\par \par Pharmacologic nuclear stress test from January 2019 showed no symptoms of chest pain. No arrhythmias. EKG remained negative for ST changes and myocardial perfusion imaging showed normal perfusion-i.e. negative test; lVEF equals 60%;

## 2022-04-14 NOTE — PHYSICAL EXAM
[Well Developed] : well developed [Well Nourished] : well nourished [No Acute Distress] : no acute distress [Normal Conjunctiva] : normal conjunctiva [Normal Venous Pressure] : normal venous pressure [No Carotid Bruit] : no carotid bruit [Normal S1, S2] : normal S1, S2 [No Rub] : no rub [No Gallop] : no gallop [Clear Lung Fields] : clear lung fields [Good Air Entry] : good air entry [No Respiratory Distress] : no respiratory distress  [Soft] : abdomen soft [Non Tender] : non-tender [No Masses/organomegaly] : no masses/organomegaly [Normal Bowel Sounds] : normal bowel sounds [Normal Gait] : normal gait [No Edema] : no edema [No Cyanosis] : no cyanosis [No Clubbing] : no clubbing [No Varicosities] : no varicosities [No Rash] : no rash [No Skin Lesions] : no skin lesions [Moves all extremities] : moves all extremities [No Focal Deficits] : no focal deficits [Normal Speech] : normal speech [Alert and Oriented] : alert and oriented [Normal memory] : normal memory [de-identified] : Regular rhythm, grade 2-3/6 systolic murmur;

## 2022-04-14 NOTE — REVIEW OF SYSTEMS
[Joint Pain] : joint pain [Joint Stiffness] : joint stiffness [Lower Back Pain] : lower back pain [Numbness (Hypoesthesia)] : numbness [Tingling (Paresthesia)] : tingling [Negative] : Heme/Lymph

## 2022-04-14 NOTE — ASSESSMENT
[FreeTextEntry1] : eKG shows sinus bradycardia at a rate of 53. Otherwise within normal limits;\par \par \par In summary, this 76-year-old gentleman has a history for mild hypertension which has been well controlled, hyperlipidemia, heart murmur of mild to moderate aortic stenosis,moderate stenosis of the left internal carotid artery. 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

## 2022-06-24 ENCOUNTER — APPOINTMENT (OUTPATIENT)
Dept: ORTHOPEDIC SURGERY | Facility: CLINIC | Age: 77
End: 2022-06-24
Payer: COMMERCIAL

## 2022-06-24 VITALS
SYSTOLIC BLOOD PRESSURE: 162 MMHG | HEIGHT: 68.5 IN | DIASTOLIC BLOOD PRESSURE: 65 MMHG | HEART RATE: 58 BPM | BODY MASS INDEX: 25.47 KG/M2 | WEIGHT: 170 LBS

## 2022-06-24 DIAGNOSIS — M67.952 UNSPECIFIED DISORDER OF SYNOVIUM AND TENDON, LEFT THIGH: ICD-10-CM

## 2022-06-24 PROCEDURE — 99072 ADDL SUPL MATRL&STAF TM PHE: CPT

## 2022-06-24 PROCEDURE — 99214 OFFICE O/P EST MOD 30 MIN: CPT

## 2022-06-24 PROCEDURE — 72100 X-RAY EXAM L-S SPINE 2/3 VWS: CPT

## 2022-06-24 NOTE — PHYSICAL EXAM
[de-identified] :   CONSTITUTIONAL: The patient is a very pleasant individual who is well-nourished and who appears stated age.\par PSYCHIATRIC: The patient is alert and oriented X 3 and in no apparent distress, and participates with orthopedic evaluation well.\par HEAD: Atraumatic and is nonsyndromic in appearance.\par EENT: No visible thyromegaly, EOMI.\par RESPIRATORY: Respiratory rate is regular, not dyspneic on examination.\par LYMPHATICS: There is no inguinal lymphadenopathy\par INTEGUMENTARY: Skin is clean, dry, and intact about the bilateral lower extremities and lumbar spine.\par VASCULAR: There is brisk capillary refill about the bilateral lower extremities.\par NEUROLOGIC: There are no pathologic reflexes. There is  very  focal decrease in sensation of the  left lower extremities on Wartenberg pinwheel examination  consistent with an L5 distribution. Deep tendon reflexes are well maintained at 2+/4 of the bilateral lower extremities and are symmetric..\par MUSCULOSKELETAL: There is no visible muscular atrophy. Manual motor strength is well maintained in the bilateral lower extremities. Range of motion of lumbar spine is well maintained. The patient ambulates in a non-myelopathic manner. Negative tension sign and straight leg raise bilaterally. Quad extension, ankle dorsiflexion  on the left is diminished to approximately 3-4 over 5, EHL, plantar flexion, and ankle eversion are well preserved. Normal secondary orthopaedic exam of bilateral hips, greater trochanteric area, knees and ankles.  [de-identified] : 2 views of the lumbar spine taken on today's date of June 24, 2022 shows a L5 compression fracture and possible L5 lumbar laminectomy

## 2022-06-24 NOTE — DISCUSSION/SUMMARY
[de-identified] : Patient presents with essentially worsening left lower extremity radiculopathy and complaints of a dropfoot patient is status post a discectomy lumbar laminectomy at [facility] is primary surgeon does not take his insurance and he is here for further work-up based upon worsening neurologic signs and symptoms and worsening motor deficits the worsening radiculopathy as an L5 for worsening motor deficit is in the form of a dropfoot he has failed injection therapy and conservative care under Dr. Chao Dejesus /pain management for 1 year.\par \par  I have discussed my role as a surgeon which would come in the form of an L5-S1 instrumented fusion with interbody's if MRI and/or CAT scans demonstrate focal findings that may be improved with surgery.  I have discussed adjacent segment disease incomplete resolution of signs and symptoms patient signs and symptoms of been going on for approximately 1 year since his last surgeries I do think there will be a fair component of chronicity again we have discussed an L5-S1 instrumented fusion he will return after his MRI is obtained for further surgical/management discussions

## 2022-06-24 NOTE — HISTORY OF PRESENT ILLNESS
[de-identified] : Patient presents for follow-up to a lumbar discectomy and laminectomy that was performed at a different [facility].  Patient states he no longer sees his surgeon and no longer take his insurance and he overall states he is progressively getting worse he states the left lower extremity radiculopathy is worsening pain is worsening and he states he is now suffering from weakness in his left foot he is also complaining of bilateral hip pain.  He is under the guidance of pain management and on the last few months his months he has had multiple injections. [Worsening] : worsening [10] : a maximum pain level of 10/10 [Bending] : worsened by bending [Lifting] : worsened by lifting [Rest] : relieved by rest [Ataxia] : no ataxia [Incontinence] : no incontinence [Loss of Dexterity] : good dexterity [Urinary Ret.] : no urinary retention

## 2022-07-08 ENCOUNTER — APPOINTMENT (OUTPATIENT)
Dept: ORTHOPEDIC SURGERY | Facility: CLINIC | Age: 77
End: 2022-07-08

## 2022-07-08 VITALS
BODY MASS INDEX: 25.47 KG/M2 | SYSTOLIC BLOOD PRESSURE: 183 MMHG | DIASTOLIC BLOOD PRESSURE: 80 MMHG | WEIGHT: 170 LBS | HEART RATE: 62 BPM | HEIGHT: 68.5 IN

## 2022-07-08 PROCEDURE — 99213 OFFICE O/P EST LOW 20 MIN: CPT

## 2022-07-08 PROCEDURE — 73502 X-RAY EXAM HIP UNI 2-3 VIEWS: CPT

## 2022-07-08 PROCEDURE — 99072 ADDL SUPL MATRL&STAF TM PHE: CPT

## 2022-07-08 NOTE — PHYSICAL EXAM
[de-identified] : The patient appears well nourished  and in no apparent distress.  The patient is alert and oriented to person, place, and time.   Affect and mood appear normal. The head is normocephalic and atraumatic.  The eyes reveal normal sclera and extra ocular muscles are intact. The tongue is midline with no apparent lesions.  Skin shows normal turgor with no evidence of eczema or psoriasis.  No respiratory distress noted.  He reports numbness and tingling in his lower extremities. [de-identified] : Exam of the left hip shows hip flexion of 120 degrees, hip external rotation of 45 degrees, hip internal rotation of 30 degrees. There is no pain with range of motion.  \par 5/5 motor strength bilaterally distally. 	  [de-identified] : X-ray: AP view of the pelvis and 2 views of the left hip demonstrate a well preserved joint space.

## 2022-07-08 NOTE — HISTORY OF PRESENT ILLNESS
[de-identified] : Mr. JESUS GRAY is a 76 year old male presenting for evaluation of left hip pain localized mostly posteriorly/to the left buttock.  He denies any groin pain at this time.  He denies any stiffness of the hip.  He denies any difficulty putting on socks and shoes.  He previously been seeing Dr. Marc, for spine injections.  He states that he is due to have SI stabilization surgery, but would like a second opinion on this.  He has tried therapy and home exercise without improvement.  He takes anti-inflammatories and Tylenol without significant relief. He reports numbness and tingling in his lower extremities.

## 2022-07-08 NOTE — DISCUSSION/SUMMARY
[de-identified] : JESUS GRAY is a 76 year male who presents with left SI pain. Clinically his left hip exam and imaging is benign.  Reassured that his pain is not coming from his left hip joint, the patient feels confident preceding with his SI stabilization surgery as scheduled with Dr. Dejesus.  He may also consider further evaluation with his spine surgeon if his symptoms do not resolve.

## 2022-07-08 NOTE — ADDENDUM
[FreeTextEntry1] : This note was authored by Saúl Mahoney working as a medical scribe for Dr. Celso Ibanez. The note was reviewed, edited, and revised by Dr. Celso Ibanez whom is in agreement with the exam findings, imaging findings, and treatment plan. 07/08/2022

## 2022-07-12 ENCOUNTER — APPOINTMENT (OUTPATIENT)
Dept: ORTHOPEDIC SURGERY | Facility: CLINIC | Age: 77
End: 2022-07-12

## 2022-07-12 VITALS
DIASTOLIC BLOOD PRESSURE: 77 MMHG | BODY MASS INDEX: 25.47 KG/M2 | WEIGHT: 170 LBS | SYSTOLIC BLOOD PRESSURE: 179 MMHG | HEART RATE: 74 BPM | HEIGHT: 68.5 IN

## 2022-07-12 DIAGNOSIS — M21.379 FOOT DROP, UNSPECIFIED FOOT: ICD-10-CM

## 2022-07-12 PROCEDURE — 99072 ADDL SUPL MATRL&STAF TM PHE: CPT

## 2022-07-12 PROCEDURE — 99214 OFFICE O/P EST MOD 30 MIN: CPT

## 2022-07-12 NOTE — PHYSICAL EXAM
[de-identified] :  CONSTITUTIONAL: The patient is a very pleasant individual who is well-nourished and who appears stated age.\par PSYCHIATRIC: The patient is alert and oriented X 3 and in no apparent distress, and participates with orthopedic evaluation well.\par HEAD: Atraumatic and is nonsyndromic in appearance.\par EENT: No visible thyromegaly, EOMI.\par RESPIRATORY: Respiratory rate is regular, not dyspneic on examination.\par LYMPHATICS: There is no inguinal lymphadenopathy\par INTEGUMENTARY: Skin is clean, dry, and intact about the bilateral lower extremities and lumbar spine.\par VASCULAR: There is brisk capillary refill about the bilateral lower extremities.\par NEUROLOGIC: There are no pathologic reflexes. There is a decrease in sensation of the   left lower extremities on Wartenberg pinwheel examination,  consistent with an L5 distribution. Deep tendon reflexes are well maintained at 2+/4 of the bilateral lower extremities and are symmetric..\par MUSCULOSKELETAL: There is no visible muscular atrophy. Manual motor strength is well maintained in the bilateral lower extremities. Range of motion of lumbar spine is well maintained. The patient ambulates in a non-myelopathic manner. Negative tension sign and straight leg raise bilaterally. Quad extension, ankle dorsiflexion  mild decrease, EHL, plantar flexion, and ankle eversion are well preserved. Normal secondary orthopaedic exam of bilateral hips, greater trochanteric area, knees and ankles.  [de-identified] : MRI of the lumbar spine has been reviewed it is dated July 12, 2022 demonstrates a grade 1 spondylolisthesis L5-S1S1 with primarily a left-sided foraminal region and lateral recess stenosis secondary to her herniated disc.\par \par Previous lumbar x-rays have also been reviewed these were taken in our office all are 2 view format spine was taken June 24, 2022 February 25, 2020 2015 all in April.   all demonstrate age-appropriate lumbar degenerative disc disease as well as mild progression of spondylosis when compared to the 15/2015 x-rays

## 2022-07-12 NOTE — HISTORY OF PRESENT ILLNESS
[Worsening] : worsening [10] : a maximum pain level of 10/10 [Intermit.] : ~He/She~ states the symptoms seem to be intermittent [Bending] : worsened by bending [Lifting] : worsened by lifting [Walking] : worsened by walking [Rest] : relieved by rest [de-identified] : Patient presents for follow-up to his left lower extremity sciatica.  Patient had a discectomy and laminectomy approximately 1 year ago at [facility] he is left with some residual left-sided radiculopathy.  Patient is here to discuss his MRI findings and if surgery can improve his overall quality of life Dr. Dejesus is recommending an SI joint fusion.  KEITH questionnaire is negative [Ataxia] : no ataxia [Incontinence] : no incontinence [Loss of Dexterity] : good dexterity [Urinary Ret.] : no urinary retention

## 2022-07-12 NOTE — DISCUSSION/SUMMARY
[de-identified] :  A long discussion was had with the patient regarding surgical plan. Patient is aware that surgery is elective in nature and is choosing to proceed with surgery. Risks, benefits, alternatives were discussed and all questions, comments and concerns were answered to the patient's satisfaction. The statistical probability of improvement was discussed at length as well as post surgical course.  Literature was provided regarding the specific type of surgery as well as a written surgical consent which the patient will need to sign and return to the office prior to surgical date.  \par If patient is a smoker, discontinuation of smoking was advised and must be accomplished 6-8 weeks prior to surgery date.  Patient was advised that help with quitting smoking is available through [facility] Smoker's Quit Line and phone number/website was provided, or patient can ask assistance from primary care provider.  Elective surgery will not be performed unless patient complies with this policy. \par \par  We discussed the role of surgical management/revision laminectomy and instrumented fusion L5-S1.  This can help address his left L5 radiculopathy.  Patient states he is not ready for surgery at this point in time we have discussed recovery incomplete resolution of signs and symptoms.  Patient wishes to continue to follow-up with Dr. Dejesus consider nation of an SI joint fusion and conservative care patient will follow-up on an as-needed basis to discuss surgical management when he is ready

## 2022-08-08 ENCOUNTER — RX RENEWAL (OUTPATIENT)
Age: 77
End: 2022-08-08

## 2022-09-22 ENCOUNTER — APPOINTMENT (OUTPATIENT)
Dept: CARDIOLOGY | Facility: CLINIC | Age: 77
End: 2022-09-22

## 2022-09-22 ENCOUNTER — NON-APPOINTMENT (OUTPATIENT)
Age: 77
End: 2022-09-22

## 2022-09-22 VITALS
DIASTOLIC BLOOD PRESSURE: 60 MMHG | HEIGHT: 68 IN | WEIGHT: 175 LBS | BODY MASS INDEX: 26.52 KG/M2 | HEART RATE: 52 BPM | SYSTOLIC BLOOD PRESSURE: 118 MMHG | RESPIRATION RATE: 16 BRPM

## 2022-09-22 PROCEDURE — 99072 ADDL SUPL MATRL&STAF TM PHE: CPT

## 2022-09-22 PROCEDURE — 99214 OFFICE O/P EST MOD 30 MIN: CPT

## 2022-09-22 PROCEDURE — 93000 ELECTROCARDIOGRAM COMPLETE: CPT

## 2022-09-22 NOTE — HISTORY OF PRESENT ILLNESS
[FreeTextEntry1] : He has been complaining of some symptoms of numbness and tingling type discomfort going down both legs at rest and sometimes with activity. He does have a long-standing history of chronic spinal disease and low back syndrome and previous surgeries and had been followed with an orthopedic surgeon in the past; now recently diagnosed as "neuropathy", and some attempted treatment with pain management;\par \par Another complaint is some general fatigue lately, and occasional exertional dyspnea- and he reports that he has not been able to use his oral appliance for MALLORY as it needed to be refitted after dental procedures;\par \par He also has non-insulin-dependent diabetes however states his A1c has been well controlled;\par \par He does a  limited amount of physical activity and exercise and does seem to do better when in the pool--for which he did participate at [employer] through the winter;\par \par Last carotid duplex study from September 2021 showed bilateral large calcified and heterogeneous plaquing; normal flow on the right ICA, 50-69% stenosis on the left ICA--which was unchanged from previous of 20/20;\par \par

## 2022-09-22 NOTE — PHYSICAL EXAM
[Well Developed] : well developed [Well Nourished] : well nourished [No Acute Distress] : no acute distress [Normal Conjunctiva] : normal conjunctiva [Normal Venous Pressure] : normal venous pressure [No Carotid Bruit] : no carotid bruit [Normal S1, S2] : normal S1, S2 [No Rub] : no rub [No Gallop] : no gallop [Clear Lung Fields] : clear lung fields [Good Air Entry] : good air entry [No Respiratory Distress] : no respiratory distress  [Soft] : abdomen soft [Non Tender] : non-tender [No Masses/organomegaly] : no masses/organomegaly [Normal Bowel Sounds] : normal bowel sounds [Normal Gait] : normal gait [No Edema] : no edema [No Cyanosis] : no cyanosis [No Clubbing] : no clubbing [No Varicosities] : no varicosities [No Rash] : no rash [No Skin Lesions] : no skin lesions [Moves all extremities] : moves all extremities [No Focal Deficits] : no focal deficits [Normal Speech] : normal speech [Alert and Oriented] : alert and oriented [Normal memory] : normal memory [de-identified] : Regular rhythm, grade 2-3/6 systolic murmur;

## 2022-09-22 NOTE — ASSESSMENT
[FreeTextEntry1] : EKG shows sinus bradycardia at a rate of 53. Otherwise within normal limits;\par \par \par In summary, this 76-year-old gentleman has a history for mild hypertension which has been well controlled, hyperlipidemia, heart murmur of mild to moderate aortic stenosis,moderate stenosis of the left internal carotid artery. and chronic low back pain syndrome with possible affected neuropathy to the lower extremities at this time;\par \par Plan:\par \par Some fatigue and exertional dyspnea which could be multifactorial in origin including MALLORY and, possible cardiovascular deconditioning;\par \par Recommend scheduling nuclear stress test with pharmacologic protocol sometime in the near future.  Additionally echocardiogram and carotid duplex study to assess aortic stenosis and carotid stenosis;\par \par Recommend followup with neurology for possible neuropathy of lower extremities and lower spine issues;\par \par Continue current medical regimen and regular checkups and laboratory blood test with primary care encouraged;\par \par Modest physical activity as tolerated;\par \par return to office within4-5 months or p.r.n.;

## 2022-10-14 ENCOUNTER — APPOINTMENT (OUTPATIENT)
Dept: CARDIOLOGY | Facility: CLINIC | Age: 77
End: 2022-10-14

## 2022-10-14 PROCEDURE — 93880 EXTRACRANIAL BILAT STUDY: CPT

## 2022-10-14 PROCEDURE — 93306 TTE W/DOPPLER COMPLETE: CPT

## 2022-10-14 PROCEDURE — 99072 ADDL SUPL MATRL&STAF TM PHE: CPT

## 2022-10-18 ENCOUNTER — APPOINTMENT (OUTPATIENT)
Dept: ORTHOPEDIC SURGERY | Facility: CLINIC | Age: 77
End: 2022-10-18

## 2022-11-11 ENCOUNTER — APPOINTMENT (OUTPATIENT)
Dept: ORTHOPEDIC SURGERY | Facility: CLINIC | Age: 77
End: 2022-11-11

## 2022-11-11 VITALS
DIASTOLIC BLOOD PRESSURE: 79 MMHG | HEIGHT: 68 IN | BODY MASS INDEX: 26.52 KG/M2 | WEIGHT: 175 LBS | SYSTOLIC BLOOD PRESSURE: 163 MMHG | HEART RATE: 59 BPM

## 2022-11-11 DIAGNOSIS — M25.561 PAIN IN RIGHT KNEE: ICD-10-CM

## 2022-11-11 PROCEDURE — 99214 OFFICE O/P EST MOD 30 MIN: CPT | Mod: 25

## 2022-11-11 PROCEDURE — 20610 DRAIN/INJ JOINT/BURSA W/O US: CPT | Mod: RT

## 2022-11-11 PROCEDURE — 99072 ADDL SUPL MATRL&STAF TM PHE: CPT

## 2022-11-11 NOTE — PHYSICAL EXAM
[de-identified] : The patient appears well nourished  and in no apparent distress.  The patient is alert and oriented to person, place, and time.   Affect and mood appear normal. The head is normocephalic and atraumatic.  The eyes reveal normal sclera and extra ocular muscles are intact. The tongue is midline with no apparent lesions.  Skin shows normal turgor with no evidence of eczema or psoriasis.  No respiratory distress noted.  Sensation grossly intact.		  [de-identified] : Exam of the right knee shows 5 degrees of varus which is fully correctable, -7 to 122 degrees of flexion measured with a goniometer. \par Exam of the left knee shows 5 degrees of varus which is partially correctable, 0 to 138 degrees of flexion measured with a goniometer. \par 5/5 motor strength bilaterally distally. Sensation intact distally.  [de-identified] : Outside X-ray([facility]): 4 views of the right knee demonstrate bone on bone varus arthritis.\par Outside X-ray([facility]): 4 views of the left knee demonstrate moderate medial compartment joint space narrowing.

## 2022-11-11 NOTE — ADDENDUM
[FreeTextEntry1] : This note was authored by Saúl Mahoney working as a medical scribe for Dr. Celso Ibanez. The note was reviewed, edited, and revised by Dr. Celso Ibanez whom is in agreement with the exam findings, imaging findings, and treatment plan. 11/11/2022

## 2022-11-11 NOTE — PROCEDURE
[de-identified] : Using sterile technique, 2cc of depomedrol 40mg/ml, 4cc of 1% plain lidocaine, and 2 cc 0.25% marcaine was drawn up into a sterile syringe. The right knee was then sterilely prepped with chlorhexidine. Ethyl chloride spray was used to anesthetize the skin and subQ tissue.  The depomedrol/lidocaine/marcaine mixture was then injected into the knee joint in the anterolateral position. The patient tolerated the procedure well without difficulty. The patient was given instructions on the use of ice and anti-inflammatories post injection site soreness.		\par

## 2022-11-11 NOTE — DISCUSSION/SUMMARY
[de-identified] : JESUS GRAY is a 77 year male who presents with bilateral knee varus arthritis, moderate on the left, bone on bone on the right. While the patient may eventually benefit from total knee replacements, he has not yet exhausted nonoperative treatment options. As such, nonoperative treatment options for arthritis were discussed including antiinflammatories, physical therapy, intraarticular cortisone injections, and hyaluronic acid gel injections. A prescription for physical therapy was provided. The patient declined a prescription NSAID at this time and will use ibuprofen as needed. The patient received an intraarticular cortisone injection in the right knee in the office today. A series of hyaluronic acid gel injections was ordered for the patient and are pending insurance approval. The office will follow up with the patient when they become available.

## 2022-11-11 NOTE — HISTORY OF PRESENT ILLNESS
[de-identified] : Mr. JESUS GRAY is a 77 year old male presenting for longstanding bilateral knee pain, right worse than left. Patient notes pain is localized medially and worse with all weightbearing activity including walking long distance and rising from a seated position. Patient has not had injections thus far. He takes OTC NSAIDs as needed. He as tried Pt without improvement.

## 2022-11-14 ENCOUNTER — MED ADMIN CHARGE (OUTPATIENT)
Age: 77
End: 2022-11-14

## 2022-11-14 ENCOUNTER — APPOINTMENT (OUTPATIENT)
Dept: CARDIOLOGY | Facility: CLINIC | Age: 77
End: 2022-11-14

## 2022-11-14 PROCEDURE — 93015 CV STRESS TEST SUPVJ I&R: CPT

## 2022-11-14 PROCEDURE — 78452 HT MUSCLE IMAGE SPECT MULT: CPT

## 2022-11-14 PROCEDURE — A9500: CPT

## 2022-11-14 PROCEDURE — 99072 ADDL SUPL MATRL&STAF TM PHE: CPT

## 2022-11-18 RX ORDER — REGADENOSON 0.08 MG/ML
0.4 INJECTION, SOLUTION INTRAVENOUS
Qty: 4 | Refills: 0 | Status: COMPLETED | OUTPATIENT
Start: 2022-11-14

## 2022-11-23 ENCOUNTER — NON-APPOINTMENT (OUTPATIENT)
Age: 77
End: 2022-11-23

## 2022-11-23 ENCOUNTER — APPOINTMENT (OUTPATIENT)
Dept: CARDIOLOGY | Facility: CLINIC | Age: 77
End: 2022-11-23

## 2022-11-23 VITALS
HEART RATE: 60 BPM | HEIGHT: 68 IN | RESPIRATION RATE: 16 BRPM | DIASTOLIC BLOOD PRESSURE: 72 MMHG | SYSTOLIC BLOOD PRESSURE: 122 MMHG | WEIGHT: 176 LBS | BODY MASS INDEX: 26.67 KG/M2

## 2022-11-23 PROCEDURE — 99215 OFFICE O/P EST HI 40 MIN: CPT

## 2022-11-23 PROCEDURE — 93000 ELECTROCARDIOGRAM COMPLETE: CPT

## 2022-11-23 PROCEDURE — 99072 ADDL SUPL MATRL&STAF TM PHE: CPT

## 2022-11-23 NOTE — PHYSICAL EXAM
[Well Developed] : well developed [Well Nourished] : well nourished [No Acute Distress] : no acute distress [Normal Conjunctiva] : normal conjunctiva [Normal Venous Pressure] : normal venous pressure [No Carotid Bruit] : no carotid bruit [Normal S1, S2] : normal S1, S2 [No Rub] : no rub [No Gallop] : no gallop [Murmur] : murmur [Clear Lung Fields] : clear lung fields [Good Air Entry] : good air entry [No Respiratory Distress] : no respiratory distress  [Soft] : abdomen soft [Non Tender] : non-tender [No Masses/organomegaly] : no masses/organomegaly [Normal Gait] : normal gait [No Cyanosis] : no cyanosis [No Clubbing] : no clubbing [No Rash] : no rash [No Skin Lesions] : no skin lesions [Moves all extremities] : moves all extremities [No Focal Deficits] : no focal deficits [Normal Speech] : normal speech [Alert and Oriented] : alert and oriented [Normal memory] : normal memory [de-identified] : Grade II/VI to III/VI harsh systolic murmur with preserved A2. [de-identified] : Trace to 1+ pretibial/ankle edema with right greater than left

## 2022-11-23 NOTE — REASON FOR VISIT
[FreeTextEntry1] : JESUS GRAY is being seen for arrhythmia/ECG abnormalities, structural heart and valve disease, hyperlipidemia, hypertension and carotid, aortic and peripheral vascular disease. \par \par The patient is a 77-year-old white male who has a history for heart murmur of mild to moderate aortic stenosis, mild hypertension and hyperlipidemia with mild-to moderate carotid plaquing stenosis--who presents back to the office today for general cardiac checkup;\par \par He recently underwent cardiac testing and is back today to review those results;\par \par Mr. Gray is very active swimming almost daily without much complication.  He has noticed some fatigue and exertional dyspnea these last few months, but does admit it has been unchanged and has not really bothered him much;\par \par For the most part, patient denies any significant chest pain, orthopnea, PND,  palpitations or dizziness; \par  \par

## 2022-11-23 NOTE — ASSESSMENT
[FreeTextEntry1] : EKG shows sinus rhythm at a rate of 60. Otherwise within normal limits;\par \par \par In summary, this 77-year-old gentleman has a history for mild hypertension which has been well controlled, hyperlipidemia, heart murmur of mild to moderate aortic stenosis,moderate stenosis of the left internal carotid artery and chronic low back pain syndrome with possible affected neuropathy to the lower extremities at this time;\par Patient has been experiencing exertional dyspnea and fatigue over the last few months, but he reports this is unchanged and has not really affected him much;\par His most recent echocardiogram does show progressively worsening aortic valve stenosis, now with AoV area of (0.95 cm), was (1.17 cm back in 2021).  However, AoV peak and mean pressure gradients are only (23.0 mmHg) and (10.5 mmHg) respectively and AoV max velocity is only moderately increased at (2.40 m/s).  \par Fortunately, his nuclear stress test was mostly unremarkable with significant signs for coronary artery disease and his carotid doppler was stable with moderate disease unchanged from prior study.\par \par \par Plan:\par \par Patient reassured that despite his aortic valve area has been progressively decreasing, his overall pressure gradient and max velocity are not clinically significant at this time.  Recommend he continue to exercise daily and to monitor for symptom changes such as worsening shortness of breath, fatigue, lightheadedness and/or chest pain.\par \par Recommend patient report any untoward symptoms to our office immediately.  Will consider undergoing cardiac catheterization in the future to further delineate extent of aortic valve stenosis if symptoms dictate;\par \par Once again recommend followup with neurology for possible neuropathy of lower extremities and lower spine issues;\par \par Continue current medical regimen and regular checkups and laboratory blood test with primary care encouraged;\par \par Return to office with Dr. Pond within 4-5 months or p.r.n.;.

## 2022-11-23 NOTE — HISTORY OF PRESENT ILLNESS
[FreeTextEntry1] : He has also been complaining of some symptoms of numbness and tingling type discomfort going down both legs at rest and sometimes with activity. He does have a long-standing history of chronic spinal disease and low back syndrome and previous surgeries and had been followed with an orthopedic surgeon in the past; now recently diagnosed as "neuropathy", and some attempted treatment with pain management;\par \par He also has non-insulin-dependent diabetes however states his A1c has been well controlled;\par \par Tolerating current cardiac medical regimen without difficulty; \par \par Transthoracic Echocardiogram (10/14/2022):  There is now moderate to severe aortic valve stenosis with AoV area (0.95 cm^2), but with AoV peak and mean pressure gradients of (23.0 mmHg) and (10.5 mmHg) respectively and AoV max velocity (2.40 m/s).  There is normal LV size and wall thickness, with normal systolic and diastolic function; LVEF of 60 to 65%.  The left and right atria normal in size and structure.  Trace MR. Mildly elevated pulmonary artery systolic pressure.  There is trivial pericardial effusion;\par \par Carotid Doppler (10/14/2022):  The left demonstrates mild calcified and heterogenous plaquing. There is 50-69% stenosis of the left proximal ICA. The right demonstrates moderate heterogenous plaquing.  There is 1-49% stenosis of right proximal ICA.  There is antegrade flow of the right and left vertebral arteries.  When compared to prior study dated 2020, there has been no significant interval change;\par \par  Pharmacologic Nuclear Stress test (11/14/2022):  Patient developed SOB, nausea and dizziness during stress exam which resolved with rest.  Patient developed occasional PVC, rare PAC during exercise.  The EKG was negative for ischemia.  Normal Sestamibi myocardial perfusion imaging with artifact. LV function was hyperdynamic with LVFEF of 71%.  There was no interval change when compared to prior study dated 2019;  1

## 2022-12-08 ENCOUNTER — APPOINTMENT (OUTPATIENT)
Dept: ORTHOPEDIC SURGERY | Facility: CLINIC | Age: 77
End: 2022-12-08

## 2022-12-08 PROCEDURE — 99072 ADDL SUPL MATRL&STAF TM PHE: CPT

## 2022-12-08 PROCEDURE — 99213 OFFICE O/P EST LOW 20 MIN: CPT | Mod: 25

## 2022-12-08 PROCEDURE — 20610 DRAIN/INJ JOINT/BURSA W/O US: CPT | Mod: 50

## 2022-12-08 NOTE — PHYSICAL EXAM
[de-identified] : The patient appears well nourished  and in no apparent distress.  The patient is alert and oriented to person, place, and time.   Affect and mood appear normal. The head is normocephalic and atraumatic.  The eyes reveal normal sclera and extra ocular muscles are intact. The tongue is midline with no apparent lesions.  Skin shows normal turgor with no evidence of eczema or psoriasis.  No respiratory distress noted.  Sensation grossly intact.		  [de-identified] : Exam of the right knee shows 5 degrees of varus which is fully correctable, -7 to 122 degrees of flexion measured with a goniometer. \par Exam of the left knee shows 5 degrees of varus which is partially correctable, 0 to 138 degrees of flexion measured with a goniometer. \par 5/5 motor strength bilaterally distally. Sensation intact distally.  [de-identified] : Outside X-ray([facility]): 4 views of the right knee demonstrate bone on bone varus arthritis.\par Outside X-ray([facility]): 4 views of the left knee demonstrate moderate medial compartment joint space narrowing.

## 2022-12-08 NOTE — PROCEDURE
[de-identified] : Allergies: The patient denies allergies to medications and has no allergies to chicken,eggs, or feathers.\par Procedure: The patient has been identified by name and date of birth. Patient confirms that we are treating the bilateral knees today.\par The knees were prepped in the usual sterile fashion. The areas were cleansed with chlorhexadine, then sprayed with ethyl chloride. The patient was then injected with the Genvisc into the bilateral knees in the anterior position. The patient tolerated the procedure well. The medication was delivered aseptically and atraumatically.\par Diagnosis: Osteoarthritis of the bilateral knees\par Treatment: The patient was advised on the activities for today. I gave the patient instructions on postinjection ice and analgesia.\par

## 2022-12-08 NOTE — REASON FOR VISIT
[Follow-Up Visit] : a follow-up visit for [Other: ____] : [unfilled] [FreeTextEntry2] : Wevebob B/l lot # [id number] exp 02/28/2025

## 2022-12-08 NOTE — DISCUSSION/SUMMARY
[de-identified] : JESUS GRAY is a 77 year male who presents with bilateral knee varus arthritis, moderate on the left, bone on bone on the right. While the patient may eventually benefit from total knee replacements, he has not yet exhausted nonoperative treatment options. As such, nonoperative treatment options for arthritis were discussed including antiinflammatories, physical therapy, intraarticular cortisone injections, and hyaluronic acid gel injections. Patient received the first of five gel injections to the bilateral knees using sterile technique. He tolerated well. He will ice and elevate when home. Follow up in 1 week for next injection.

## 2022-12-08 NOTE — HISTORY OF PRESENT ILLNESS
[de-identified] : Mr. JESUS GRAY is a 77 year old male presenting for longstanding bilateral knee pain, right worse than left. Patient notes pain is localized medially and worse with all weightbearing activity including walking long distance and rising from a seated position. Patient has not had injections thus far. He takes OTC NSAIDs as needed. He as tried Pt without improvement. Patient presents for bilateral knee Genvisc injections.

## 2022-12-16 ENCOUNTER — APPOINTMENT (OUTPATIENT)
Dept: ORTHOPEDIC SURGERY | Facility: CLINIC | Age: 77
End: 2022-12-16

## 2022-12-16 PROCEDURE — 99072 ADDL SUPL MATRL&STAF TM PHE: CPT

## 2022-12-16 PROCEDURE — 20610 DRAIN/INJ JOINT/BURSA W/O US: CPT | Mod: 50

## 2022-12-16 NOTE — HISTORY OF PRESENT ILLNESS
[de-identified] : The patient is here today for the second Genvisc injection for the bilateral knees. \par Allergies: The patient denies allergies to medications and has no allergies to chicken,eggs, or feathers.\par Procedure: The patient has been identified by name and date of birth. Patient confirms that we are treating the bilateral knees today.\par The knees were prepped in the usual sterile fashion. The areas were cleansed with chlorhexadine, then sprayed with ethyl chloride. The patient was then injected with the Genvisc into the bilateral knees in the anterior position. The patient tolerated the procedure well. The medication was delivered aseptically and atraumatically.\par Diagnosis: Osteoarthritis of the bilateral knees\par Treatment: The patient was advised on the activities for today. I gave the patient instructions on postinjection ice and analgesia.\par Follow up recommended in one week

## 2022-12-23 ENCOUNTER — APPOINTMENT (OUTPATIENT)
Dept: ORTHOPEDIC SURGERY | Facility: CLINIC | Age: 77
End: 2022-12-23

## 2022-12-23 PROCEDURE — 20610 DRAIN/INJ JOINT/BURSA W/O US: CPT | Mod: 50

## 2022-12-23 NOTE — REASON FOR VISIT
[Follow-Up Visit] : a follow-up visit for [Other: ____] : [unfilled] [FreeTextEntry2] :  Intean [employer]g #3 B/l lot # [id number] exp 02/28/2025. \par

## 2022-12-23 NOTE — HISTORY OF PRESENT ILLNESS
[de-identified] : The patient is here today for Genvisc injection for the bilateral knees. \par Allergies: The patient denies allergies to medications and has no allergies to chicken,eggs, or feathers.\par Procedure: The patient has been identified by name and date of birth. Patient confirms that we are treating the bilateral knees today.\par The knees were prepped in the usual sterile fashion. The areas were cleansed with chlorhexadine, then sprayed with ethyl chloride. The patient was then injected with the Genvisc into the bilateral knees in the anterior position. The patient tolerated the procedure well. The medication was delivered aseptically and atraumatically.\par Diagnosis: Osteoarthritis of the bilateral knees\par Treatment: The patient was advised on the activities for today. I gave the patient instructions on postinjection ice and analgesia.\par Follow up recommended in one week

## 2022-12-30 ENCOUNTER — APPOINTMENT (OUTPATIENT)
Dept: ORTHOPEDIC SURGERY | Facility: CLINIC | Age: 77
End: 2022-12-30
Payer: COMMERCIAL

## 2022-12-30 VITALS
SYSTOLIC BLOOD PRESSURE: 137 MMHG | WEIGHT: 168 LBS | HEIGHT: 68 IN | BODY MASS INDEX: 25.46 KG/M2 | HEART RATE: 63 BPM | DIASTOLIC BLOOD PRESSURE: 76 MMHG

## 2022-12-30 PROCEDURE — 20610 DRAIN/INJ JOINT/BURSA W/O US: CPT | Mod: 50

## 2022-12-30 NOTE — HISTORY OF PRESENT ILLNESS
[de-identified] : The patient is here today for Genvisc injection for the bilateral knees. \par Allergies: The patient denies allergies to medications and has no allergies to chicken,eggs, or feathers.\par Procedure: The patient has been identified by name and date of birth. Patient confirms that we are treating the bilateral knees today.\par The knees were prepped in the usual sterile fashion. The areas were cleansed with chlorhexadine, then sprayed with ethyl chloride. The patient was then injected with the Genvisc into the bilateral knees in the anterior position. The patient tolerated the procedure well. The medication was delivered aseptically and atraumatically.\par Diagnosis: Osteoarthritis of the bilateral knees\par Treatment: The patient was advised on the activities for today. I gave the patient instructions on postinjection ice and analgesia.\par Follow up recommended in one week

## 2023-01-04 ENCOUNTER — APPOINTMENT (OUTPATIENT)
Dept: ORTHOPEDIC SURGERY | Facility: CLINIC | Age: 78
End: 2023-01-04
Payer: COMMERCIAL

## 2023-01-04 VITALS
HEART RATE: 62 BPM | HEIGHT: 68 IN | OXYGEN SATURATION: 99 % | SYSTOLIC BLOOD PRESSURE: 132 MMHG | WEIGHT: 168 LBS | BODY MASS INDEX: 25.46 KG/M2 | DIASTOLIC BLOOD PRESSURE: 80 MMHG

## 2023-01-04 PROCEDURE — 20611 DRAIN/INJ JOINT/BURSA W/US: CPT | Mod: 50

## 2023-01-04 NOTE — HISTORY OF PRESENT ILLNESS
[de-identified] : Patient is here for the fifth Genvisc injection for the bilateral knees. \par Allergies: The patient denies allergies to medications and has no allergies to chicken,eggs, or feathers.\par Procedure: The patient has been identified by name and date of birth. Patient confirms that we are treating the bilateral knee today.\par The knee was prepped in the usual sterile fashion. The knee joint space was identified using ultrasound. The areas were cleansed with chlorhexadine, then sprayed with ethyl chloride. The patient was then injected with the Genvisc into the bilateral knee using ultrasound guidance  and the needle position in the superolateral joint space was confirmed. The patient tolerated the procedure well. The medication was delivered aseptically and atraumatically.\par Diagnosis: Osteoarthritis of the bilateral knee\par Treatment: The patient was advised on the activities for today. I gave the patient instructions on postinjection ice and analgesia.\par The patient will follow up in 6-8 weeks.

## 2023-01-04 NOTE — REASON FOR VISIT
[Follow-Up Visit] : a follow-up visit for [Other: ____] : [unfilled] [FreeTextEntry2] : Bilateral GenVisc Injection #5, Lot# [id number], Expires 02/28/2025

## 2023-01-06 ENCOUNTER — APPOINTMENT (OUTPATIENT)
Dept: ORTHOPEDIC SURGERY | Facility: CLINIC | Age: 78
End: 2023-01-06

## 2023-01-12 ENCOUNTER — APPOINTMENT (OUTPATIENT)
Dept: ORTHOPEDIC SURGERY | Facility: CLINIC | Age: 78
End: 2023-01-12
Payer: COMMERCIAL

## 2023-01-12 VITALS — WEIGHT: 168 LBS | HEIGHT: 68 IN | BODY MASS INDEX: 25.46 KG/M2

## 2023-01-12 DIAGNOSIS — M75.01 ADHESIVE CAPSULITIS OF RIGHT SHOULDER: ICD-10-CM

## 2023-01-12 DIAGNOSIS — M75.32 CALCIFIC TENDINITIS OF LEFT SHOULDER: ICD-10-CM

## 2023-01-12 DIAGNOSIS — M75.31 CALCIFIC TENDINITIS OF RIGHT SHOULDER: ICD-10-CM

## 2023-01-12 DIAGNOSIS — M75.02 ADHESIVE CAPSULITIS OF RIGHT SHOULDER: ICD-10-CM

## 2023-01-12 PROCEDURE — 99214 OFFICE O/P EST MOD 30 MIN: CPT | Mod: 25

## 2023-01-12 PROCEDURE — 73030 X-RAY EXAM OF SHOULDER: CPT | Mod: 50

## 2023-01-12 PROCEDURE — 20611 DRAIN/INJ JOINT/BURSA W/US: CPT | Mod: LT

## 2023-01-16 NOTE — DISCUSSION/SUMMARY
[de-identified] : We had a thorough discussion regarding the nature of his pain, the pathophysiology, as well as all treatment options. Pt due to his acute pain elected for an ultrasound guided corticosteroid injection at today's visit and tolerated the procedure well. He should take it easy for the next 2-3 days while icing the joint. Due to his history of diabetes, patient will follow up for a cortisone injection in his right shoulder at a later date. All questions were answered and the patient verbalized understanding. The patient is in agreement with this treatment plan.

## 2023-01-16 NOTE — PROCEDURE
[de-identified] : US Guided Injection: Left shoulder (Subacromial).\par Indication: Pain\par \par A discussion was had with the patient regarding this procedure and all questions were answered. All risks, benefits and alternatives were discussed. These included but were not limited to bleeding, infection, and allergic reaction. Alcohol was used to clean the skin, and betadine was used to sterilize and prep the area in the posterior aspect of the left shoulder. Ethyl chloride spray was then used as a topical anesthetic. A 22-gauge needle was used to inject 3cc 1% xylocaine, 2cc 0.25% bupivacaine and 1cc of 40mg/mL triamcinolone acetonide into the left subacromial space. A sterile bandage was then applied. The patient tolerated the procedure well and there were no complications.\par

## 2023-01-16 NOTE — HISTORY OF PRESENT ILLNESS
[Pain Location] : pain [Stable] : stable [___ mths] : [unfilled] month(s) ago [de-identified] : JESUS is a 77 year male here today for followup bilateral shoulder pain. Patient is diabetic. L > R. He states he had significant relief from the last cortisone injection.

## 2023-01-16 NOTE — ADDENDUM
[FreeTextEntry1] : Documented by Vero Villegas acting as a scribe for Dr. Dickinson and Channing Wade PA-C on 01/12/2023.   All medical record entries made by the Scribe were at my, Dr. Dickinson, and Channing Wade's, direction and personally dictated by me on 01/12/2023. I have reviewed the chart and agree that the record accurately reflects my personal performance of the history, physical exam, procedure and imaging.

## 2023-01-16 NOTE — PHYSICAL EXAM
[de-identified] : Physical Exam:\par General: Well appearing, no acute distress\par Neurologic: A&Ox3, No focal deficits\par Head: NCAT without abrasions, lacerations, or ecchymosis to head, face, or scalp\par Eyes: No scleral icterus, no gross abnormalities\par Respiratory: Equal chest wall expansion bilaterally, no accessory muscle use\par Lymphatic: No lymphadenopathy palpated\par Skin: Warm and dry\par Psychiatric: Normal mood and affect\par \par Right Shoulder\par ·	Inspection/Palpation: Tenderness at infraspinatus insertion, no swelling or deformities\par ·	Range of Motion: no crepitus with ROM; Active FF 0-110; ER at side 30; IR to back pocket with pain ; Passive FF 0-120; ER at side 35; IR to back pocket with pain\par ·	Strength: forward elevation in scapular plane [4/5], internal rotation [4/5], external rotation [4/5], adduction [4/5] and abduction [4/5]\par ·	Stability: no joint instability on provocative testing\par ·	Tests: Anderson test negative, Neer sign negative, POS drop arm test secondary to pain, bear hug test POS, Napolean sign POS, cross arm adduction POS, lift off sign positive, hornblowers sign negative, speeds test POS, Yergason's test POS, no bicipital groove tenderness, Farias's Active Compression test POS\par \par Left Shoulder\par ·	Inspection/Palpation: Tenderness at supraspinatus insertion, no crepitus, no deformities\par ·	Range of Motion: no crepitus with ROM; Active ; ER at side 20; IR to L1; Passive ; ER at side 25; IR to L1\par ·	Strength: forward elevation in scapular plane [4/5], internal rotation [4/5], external rotation [4/5], adduction [4/5] and abduction [4/5]\par ·	Stability: no joint instability on provocative testing\par ·	Tests: Anderson test positive, Neer positive, positive drop arm test secondary to pain, bear hug test positive, Napolean sign POS, cross arm adduction positive, lift off sign positive, hornblowers sign POS, speeds test negative, Yergason's test negative, Farias's Active Compression test negative, whipple test positive, bicep's load II test negative\par  [de-identified] : 4 views of the left shoulder show a large calcium deposit to surpraspinatus insertion, no fracture, dislocation or bony lesions. There is evidence of degenerative change in the glenohumeral and acromioclavicular joints; other 2 views were reviewed from [facility]\par \par 4 views of the right shoulder show small spec calcium deposit to infraspinatus insertion, no fracture, dislocation or bony lesions. There is evidence of degenerative change in the glenohumeral and acromioclavicular joints; other 2 views were reviewed from [facility].

## 2023-02-06 ENCOUNTER — APPOINTMENT (OUTPATIENT)
Dept: ORTHOPEDIC SURGERY | Facility: CLINIC | Age: 78
End: 2023-02-06

## 2023-02-16 RX ORDER — KIT FOR THE PREPARATION OF TECHNETIUM TC99M SESTAMIBI 1 MG/5ML
INJECTION, POWDER, LYOPHILIZED, FOR SOLUTION PARENTERAL
Refills: 0 | Status: COMPLETED | OUTPATIENT
Start: 2023-02-16

## 2023-02-16 RX ADMIN — KIT FOR THE PREPARATION OF TECHNETIUM TC99M SESTAMIBI 0: 1 INJECTION, POWDER, LYOPHILIZED, FOR SOLUTION PARENTERAL at 00:00

## 2023-02-28 ENCOUNTER — OFFICE (OUTPATIENT)
Dept: URBAN - METROPOLITAN AREA CLINIC 6 | Facility: CLINIC | Age: 78
Setting detail: OPHTHALMOLOGY
End: 2023-02-28
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
## 2023-02-28 PROCEDURE — NO SHOW FE NO SHOW FEE: Performed by: OPHTHALMOLOGY

## 2023-03-03 ENCOUNTER — APPOINTMENT (OUTPATIENT)
Dept: ORTHOPEDIC SURGERY | Facility: CLINIC | Age: 78
End: 2023-03-03
Payer: OTHER GOVERNMENT

## 2023-03-03 VITALS — HEIGHT: 68 IN | BODY MASS INDEX: 25.46 KG/M2 | WEIGHT: 168 LBS

## 2023-03-03 PROCEDURE — 99213 OFFICE O/P EST LOW 20 MIN: CPT

## 2023-03-03 NOTE — PHYSICAL EXAM
[de-identified] : The patient appears well nourished  and in no apparent distress.  The patient is alert and oriented to person, place, and time.   Affect and mood appear normal. The head is normocephalic and atraumatic.  The eyes reveal normal sclera and extra ocular muscles are intact. The tongue is midline with no apparent lesions.  Skin shows normal turgor with no evidence of eczema or psoriasis.  No respiratory distress noted.  Sensation grossly intact.		  [de-identified] : Exam of the right knee shows 5 degrees of varus which is fully correctable, -7 to 122 degrees of flexion measured with a goniometer. \par Exam of the left knee shows 5 degrees of varus which is partially correctable, 0 to 138 degrees of flexion measured with a goniometer. \par 5/5 motor strength bilaterally distally. Sensation intact distally.  [de-identified] : Outside X-ray([facility]): 4 views of the right knee demonstrate bone on bone varus arthritis.\par Outside X-ray([facility]): 4 views of the left knee demonstrate moderate medial compartment joint space narrowing.

## 2023-03-03 NOTE — DISCUSSION/SUMMARY
[de-identified] : JESUS GRAY is a 77 year male who presents with bilateral knee varus arthritis, moderate on the left, bone on bone on the right. While the patient may eventually benefit from total knee replacements, he has not yet exhausted nonoperative treatment options. As such, nonoperative treatment options for arthritis were discussed including antiinflammatories, physical therapy, intraarticular cortisone injections, and hyaluronic acid gel injections. Patient wishes to hold off on any steroid injections for now. He declines PT. He declines meloxicam. Follow up as needed.

## 2023-03-03 NOTE — HISTORY OF PRESENT ILLNESS
[de-identified] : Mr. JESUS GRAY is a 77 year old male presenting for longstanding bilateral knee pain, right worse than left. Patient notes pain is localized medially and worse with all weightbearing activity including walking long distance and rising from a seated position. Patient has not had injections thus far. He takes OTC NSAIDs as needed. He recently completed gel injection on 1/4/23. He reports a 70% improvement from this. He also swims daily which is helping.

## 2023-05-04 ENCOUNTER — NON-APPOINTMENT (OUTPATIENT)
Age: 78
End: 2023-05-04

## 2023-05-04 ENCOUNTER — APPOINTMENT (OUTPATIENT)
Dept: CARDIOLOGY | Facility: CLINIC | Age: 78
End: 2023-05-04
Payer: COMMERCIAL

## 2023-05-04 VITALS
DIASTOLIC BLOOD PRESSURE: 70 MMHG | HEIGHT: 68 IN | SYSTOLIC BLOOD PRESSURE: 128 MMHG | RESPIRATION RATE: 16 BRPM | WEIGHT: 172 LBS | BODY MASS INDEX: 26.07 KG/M2 | HEART RATE: 54 BPM

## 2023-05-04 DIAGNOSIS — R06.02 SHORTNESS OF BREATH: ICD-10-CM

## 2023-05-04 DIAGNOSIS — R53.82 CHRONIC FATIGUE, UNSPECIFIED: ICD-10-CM

## 2023-05-04 PROCEDURE — 99072 ADDL SUPL MATRL&STAF TM PHE: CPT

## 2023-05-04 PROCEDURE — 99214 OFFICE O/P EST MOD 30 MIN: CPT

## 2023-05-04 PROCEDURE — 93000 ELECTROCARDIOGRAM COMPLETE: CPT

## 2023-05-04 RX ORDER — HYALURONATE SODIUM 10 MG/ML
25 SYRINGE (ML) INTRAARTICULAR
Qty: 2 | Refills: 0 | Status: DISCONTINUED | OUTPATIENT
Start: 2022-11-11 | End: 2023-05-04

## 2023-05-04 NOTE — ASSESSMENT
[FreeTextEntry1] : EKG shows sinus bradycardia at a rate of 54 bpm; . Otherwise within normal limits;\par \par \par In summary, this 77-year-old gentleman has a history for mild hypertension which has been well controlled, hyperlipidemia, heart murmur of mild to moderate aortic stenosis,moderate stenosis of the left internal carotid artery and chronic low back pain syndrome with possible affected neuropathy to the lower extremities at this time;\par Patient has been experiencing exertional dyspnea and fatigue over the last few months;   , but he reports this is unchanged and has not really affected him much;\par His most recent echocardiogram does show progressively worsening aortic valve stenosis, now with AoV area of (0.95 cm), was (1.17 cm back in 2021).  However, AoV peak and mean pressure gradients are only (23.0 mmHg) and (10.5 mmHg) respectively and AoV max velocity is only moderately increased at (2.40 m/s).  \par Fortunately, his nuclear stress test was mostly unremarkable with significant signs for coronary artery disease and his carotid doppler was stable with moderate disease unchanged from prior study.\par \par \par Plan:\par \par Have explained to patient that although his aortic stenosis is moderate to severe, clinically it still and that range and not quite severe or critical to cause much of his symptoms at this point.;\par \par Additionally although his carotid stenosis is moderate on the left side, it has been unchanged over the past 2 years and we will continue to monitor this as well as keeping his blood pressure and cholesterol under good control;\par \par Recommend recheck carotid duplex study and echocardiogram prior to next visit within 4 to 5 months;\par \par Patient encouraged to participate in a modest physical activity and exercise regimen to improve his functional capacity and reassess his symptomatology and fatigue;\par \par Patient to notify us if there is any significant change in his symptoms going forward between now and next visit;

## 2023-05-04 NOTE — REASON FOR VISIT
[FreeTextEntry1] : JESUS GRAY is being seen for arrhythmia/ECG abnormalities, structural heart and valve disease, hyperlipidemia, hypertension and carotid, aortic and peripheral vascular disease. \par \par The patient is a 77-year-old white male who has a history for heart murmur of moderate aortic stenosis, mild hypertension and hyperlipidemia with mild-to moderate carotid plaquing stenosis--who presents back to the office today for general cardiac checkup;\par \par Patient reports that he has noted some increasing exertional dyspnea at times and just feeling somewhat generally fatigued.  He readily admits that he has been much less active through the winter because of his various arthritides and trying to get back into better shape.\par \par He is concerned as to whether his aortic valve disease or carotid stenosis could be contributing to some of his symptoms;\par \par Prior to the winter he had been much more physically active and even swimming but has cut back quite a bit;\par \par For the most part, patient denies any significant chest pain, orthopnea, PND,  palpitations or syncope;\par He does describe a little bit more exertional dyspnea and occasional dizziness\par \par  \par

## 2023-05-04 NOTE — HISTORY OF PRESENT ILLNESS
[FreeTextEntry1] : He has also been complaining of some symptoms of numbness and tingling type discomfort going down both legs at rest and sometimes with activity. He does have a long-standing history of chronic spinal disease and low back syndrome and previous surgeries and had been followed with an orthopedic surgeon in the past; now recently diagnosed as "neuropathy", also possibly diabetic neuropathy related; it does not appear to be claudication-like symptoms;\par \par He also has non-insulin-dependent diabetes however states his A1c has been well controlled;\par \par Tolerating current cardiac medical regimen without difficulty; \par \par Transthoracic Echocardiogram (10/14/2022):  There is now moderate to severe aortic valve stenosis with AoV area (0.95 cm^2), but with AoV peak and mean pressure gradients of (23.0 mmHg) and (10.5 mmHg) respectively and AoV max velocity (2.40 m/s).  There is normal LV size and wall thickness, with normal systolic and diastolic function; LVEF of 60 to 65%.  The left and right atria normal in size and structure.  Trace MR. Mildly elevated pulmonary artery systolic pressure.  There is trivial pericardial effusion;\par \par Carotid Doppler (10/14/2022):  The left demonstrates mild calcified and heterogenous plaquing. There is 50-69% stenosis of the left proximal ICA. The right demonstrates moderate heterogenous plaquing.  There is 1-49% stenosis of right proximal ICA.  There is antegrade flow of the right and left vertebral arteries.  When compared to prior study dated 2020, there has been no significant interval change;\par \par  Pharmacologic Nuclear Stress test (11/14/2022):  Patient developed SOB, nausea and dizziness during stress exam which resolved with rest.  Patient developed occasional PVC, rare PAC during exercise.  The EKG was negative for ischemia.  Normal Sestamibi myocardial perfusion imaging with artifact. LV function was hyperdynamic with LVFEF of 71%.  There was no interval change when compared to prior study dated 2019;

## 2023-05-04 NOTE — REVIEW OF SYSTEMS
[Feeling Fatigued] : feeling fatigued [Dyspnea on exertion] : dyspnea during exertion [Numbness (Hypoesthesia)] : numbness [Tingling (Paresthesia)] : tingling [Negative] : Heme/Lymph

## 2023-05-04 NOTE — PHYSICAL EXAM
[Well Developed] : well developed [Well Nourished] : well nourished [No Acute Distress] : no acute distress [Normal Conjunctiva] : normal conjunctiva [Normal Venous Pressure] : normal venous pressure [No Carotid Bruit] : no carotid bruit [Normal S1, S2] : normal S1, S2 [No Rub] : no rub [No Gallop] : no gallop [Murmur] : murmur [Clear Lung Fields] : clear lung fields [Good Air Entry] : good air entry [No Respiratory Distress] : no respiratory distress  [Soft] : abdomen soft [Non Tender] : non-tender [No Masses/organomegaly] : no masses/organomegaly [Normal Gait] : normal gait [No Cyanosis] : no cyanosis [No Clubbing] : no clubbing [No Rash] : no rash [No Skin Lesions] : no skin lesions [Moves all extremities] : moves all extremities [No Focal Deficits] : no focal deficits [Normal Speech] : normal speech [Alert and Oriented] : alert and oriented [Normal memory] : normal memory [de-identified] : Grade II/VI to III/VI harsh systolic murmur with preserved A2.  But slightly diminished; [de-identified] : Trace to 1+ pretibial/ankle edema with right greater than left

## 2023-05-09 ENCOUNTER — OFFICE (OUTPATIENT)
Dept: URBAN - METROPOLITAN AREA CLINIC 6 | Facility: CLINIC | Age: 78
Setting detail: OPHTHALMOLOGY
End: 2023-05-09
Payer: COMMERCIAL

## 2023-05-09 DIAGNOSIS — H02.834: ICD-10-CM

## 2023-05-09 DIAGNOSIS — H01.004: ICD-10-CM

## 2023-05-09 DIAGNOSIS — H25.13: ICD-10-CM

## 2023-05-09 DIAGNOSIS — H11.152: ICD-10-CM

## 2023-05-09 DIAGNOSIS — H01.002: ICD-10-CM

## 2023-05-09 DIAGNOSIS — H01.001: ICD-10-CM

## 2023-05-09 DIAGNOSIS — H01.005: ICD-10-CM

## 2023-05-09 DIAGNOSIS — H02.831: ICD-10-CM

## 2023-05-09 DIAGNOSIS — E11.9: ICD-10-CM

## 2023-05-09 PROCEDURE — 99214 OFFICE O/P EST MOD 30 MIN: CPT | Performed by: OPHTHALMOLOGY

## 2023-05-09 ASSESSMENT — AXIALLENGTH_DERIVED
OS_AL: 23.4412
OS_AL: 23.4412
OD_AL: 23.6435
OD_AL: 23.6435
OS_AL: 23.2504
OD_AL: 23.4495

## 2023-05-09 ASSESSMENT — SPHEQUIV_DERIVED
OS_SPHEQUIV: 0.625
OD_SPHEQUIV: 0.25
OS_SPHEQUIV: 1.125
OD_SPHEQUIV: -0.25
OS_SPHEQUIV: 0.625
OD_SPHEQUIV: -0.25

## 2023-05-09 ASSESSMENT — KERATOMETRY
OS_K2POWER_DIOPTERS: 43.50
OS_AXISANGLE_DEGREES: 075
OS_K1POWER_DIOPTERS: 43.00
OD_K1POWER_DIOPTERS: 43.50
OD_K2POWER_DIOPTERS: 43.75
OD_AXISANGLE_DEGREES: 105

## 2023-05-09 ASSESSMENT — REFRACTION_MANIFEST
OD_AXIS: 105
OS_CYLINDER: -0.75
OD_AXIS: 110
OU_VA: 20/20-2
OS_ADD: +2.50
OS_SPHERE: +1.00
OU_VA: 20/25+3
OD_SPHERE: +0.25
OD_SPHERE: +0.50
OD_CYLINDER: -1.00
OS_VA1: 20/20
OD_VA1: 20/40
OD_VA1: 20/20
OD_ADD: +2.50
OD_CYLINDER: -0.50
OS_AXIS: 170
OS_SPHERE: +1.50
OS_AXIS: 170
OS_CYLINDER: -0.75
OS_VA1: 20/25+2

## 2023-05-09 ASSESSMENT — LID POSITION - DERMATOCHALASIS
OS_DERMATOCHALASIS: LUL 1+
OD_DERMATOCHALASIS: RUL 1+

## 2023-05-09 ASSESSMENT — REFRACTION_AUTOREFRACTION
OD_SPHERE: +0.25
OD_AXIS: 105
OS_AXIS: 170
OS_SPHERE: +1.00
OS_CYLINDER: -0.75
OD_CYLINDER: -1.00

## 2023-05-09 ASSESSMENT — REFRACTION_CURRENTRX
OD_VPRISM_DIRECTION: BF
OD_AXIS: 090
OD_OVR_VA: 20/
OD_SPHERE: +0.50
OD_ADD: +2.50
OS_AXIS: 060
OS_SPHERE: +1.00
OS_ADD: +2.50
OD_CYLINDER: -0.50
OS_VPRISM_DIRECTION: BF
OS_CYLINDER: -0.50
OS_OVR_VA: 20/

## 2023-05-09 ASSESSMENT — VISUAL ACUITY
OS_BCVA: 20/40
OD_BCVA: 20/25+2

## 2023-05-09 ASSESSMENT — TONOMETRY
OS_IOP_MMHG: 13
OD_IOP_MMHG: 15

## 2023-05-09 ASSESSMENT — LID EXAM ASSESSMENTS
OD_BLEPHARITIS: RLL RUL
OS_BLEPHARITIS: LLL LUL

## 2023-05-09 ASSESSMENT — CONFRONTATIONAL VISUAL FIELD TEST (CVF)
OD_FINDINGS: FULL
OS_FINDINGS: FULL

## 2023-05-23 ENCOUNTER — OFFICE (OUTPATIENT)
Dept: URBAN - METROPOLITAN AREA CLINIC 6 | Facility: CLINIC | Age: 78
Setting detail: OPHTHALMOLOGY
End: 2023-05-23
Payer: COMMERCIAL

## 2023-05-23 DIAGNOSIS — H25.13: ICD-10-CM

## 2023-05-23 DIAGNOSIS — H25.11: ICD-10-CM

## 2023-05-23 PROCEDURE — 92136 OPHTHALMIC BIOMETRY: CPT | Performed by: OPHTHALMOLOGY

## 2023-05-23 PROCEDURE — 99213 OFFICE O/P EST LOW 20 MIN: CPT | Performed by: OPHTHALMOLOGY

## 2023-05-23 ASSESSMENT — AXIALLENGTH_DERIVED
OS_AL: 23.2504
OD_AL: 23.5975
OD_AL: 23.5489
OD_AL: 23.4042
OS_AL: 23.4412
OS_AL: 23.4412

## 2023-05-23 ASSESSMENT — REFRACTION_CURRENTRX
OD_OVR_VA: 20/
OS_VPRISM_DIRECTION: BF
OD_SPHERE: +0.50
OD_CYLINDER: -0.50
OD_AXIS: 090
OS_SPHERE: +1.00
OS_OVR_VA: 20/
OS_ADD: +2.50
OD_ADD: +2.50
OS_CYLINDER: -0.50
OS_AXIS: 060
OD_VPRISM_DIRECTION: BF

## 2023-05-23 ASSESSMENT — REFRACTION_AUTOREFRACTION
OS_AXIS: 170
OS_SPHERE: +1.00
OD_SPHERE: +0.25
OD_CYLINDER: -0.75
OD_AXIS: 100
OS_CYLINDER: -0.75

## 2023-05-23 ASSESSMENT — KERATOMETRY
OD_K2POWER_DIOPTERS: 43.75
OD_AXISANGLE_DEGREES: 90
OS_K2POWER_DIOPTERS: 43.50
OS_K1POWER_DIOPTERS: 43.00
OS_AXISANGLE_DEGREES: 71
OD_K1POWER_DIOPTERS: 43.75

## 2023-05-23 ASSESSMENT — REFRACTION_MANIFEST
OS_SPHERE: +1.50
OD_ADD: +2.50
OD_SPHERE: +0.50
OD_CYLINDER: -1.00
OS_VA1: 20/25+2
OU_VA: 20/20-2
OD_CYLINDER: -0.50
OS_CYLINDER: -0.75
OS_VA1: 20/20
OD_VA1: 20/20
OD_SPHERE: +0.25
OD_AXIS: 110
OU_VA: 20/25+3
OD_AXIS: 105
OS_AXIS: 170
OS_ADD: +2.50
OS_AXIS: 170
OS_SPHERE: +1.00
OD_VA1: 20/40
OS_CYLINDER: -0.75

## 2023-05-23 ASSESSMENT — LID POSITION - DERMATOCHALASIS
OS_DERMATOCHALASIS: LUL 1+
OD_DERMATOCHALASIS: RUL 1+

## 2023-05-23 ASSESSMENT — VISUAL ACUITY
OD_BCVA: 20/30-2
OS_BCVA: 20/50-2

## 2023-05-23 ASSESSMENT — CONFRONTATIONAL VISUAL FIELD TEST (CVF)
OS_FINDINGS: FULL
OD_FINDINGS: FULL

## 2023-05-23 ASSESSMENT — TONOMETRY
OS_IOP_MMHG: 15
OD_IOP_MMHG: 15

## 2023-05-23 ASSESSMENT — SPHEQUIV_DERIVED
OD_SPHEQUIV: 0.25
OS_SPHEQUIV: 1.125
OD_SPHEQUIV: -0.125
OS_SPHEQUIV: 0.625
OS_SPHEQUIV: 0.625
OD_SPHEQUIV: -0.25

## 2023-05-23 ASSESSMENT — LID EXAM ASSESSMENTS
OD_BLEPHARITIS: RLL RUL
OS_BLEPHARITIS: LLL LUL

## 2023-06-01 ENCOUNTER — ASC (OUTPATIENT)
Dept: URBAN - METROPOLITAN AREA SURGERY 8 | Facility: SURGERY | Age: 78
Setting detail: OPHTHALMOLOGY
End: 2023-06-01
Payer: COMMERCIAL

## 2023-06-01 DIAGNOSIS — H25.11: ICD-10-CM

## 2023-06-01 PROCEDURE — 66984 XCAPSL CTRC RMVL W/O ECP: CPT | Performed by: OPHTHALMOLOGY

## 2023-06-02 ENCOUNTER — OFFICE (OUTPATIENT)
Dept: URBAN - METROPOLITAN AREA CLINIC 6 | Facility: CLINIC | Age: 78
Setting detail: OPHTHALMOLOGY
End: 2023-06-02
Payer: COMMERCIAL

## 2023-06-02 DIAGNOSIS — H25.12: ICD-10-CM

## 2023-06-02 PROBLEM — H01.005 BLEPHARITIS; RIGHT UPPER LID, RIGHT LOWER LID, LEFT UPPER LID, LEFT LOWER LID: Status: ACTIVE | Noted: 2023-05-09

## 2023-06-02 PROBLEM — H01.004 BLEPHARITIS; RIGHT UPPER LID, RIGHT LOWER LID, LEFT UPPER LID, LEFT LOWER LID: Status: ACTIVE | Noted: 2023-05-09

## 2023-06-02 PROBLEM — H01.002 BLEPHARITIS; RIGHT UPPER LID, RIGHT LOWER LID, LEFT UPPER LID, LEFT LOWER LID: Status: ACTIVE | Noted: 2023-05-09

## 2023-06-02 PROBLEM — H01.001 BLEPHARITIS; RIGHT UPPER LID, RIGHT LOWER LID, LEFT UPPER LID, LEFT LOWER LID: Status: ACTIVE | Noted: 2023-05-09

## 2023-06-02 PROCEDURE — 92136 OPHTHALMIC BIOMETRY: CPT | Performed by: OPHTHALMOLOGY

## 2023-06-02 ASSESSMENT — KERATOMETRY
OS_K2POWER_DIOPTERS: 43.50
OS_AXISANGLE_DEGREES: 71
OD_K2POWER_DIOPTERS: 43.75
OD_K1POWER_DIOPTERS: 43.75
OS_K1POWER_DIOPTERS: 43.00
OD_AXISANGLE_DEGREES: 90

## 2023-06-02 ASSESSMENT — REFRACTION_MANIFEST
OD_AXIS: 105
OS_SPHERE: +1.00
OS_ADD: +2.50
OD_SPHERE: +0.50
OS_VA1: 20/25+2
OU_VA: 20/20-2
OD_VA1: 20/40
OS_SPHERE: +1.50
OU_VA: 20/25+3
OD_AXIS: 110
OS_AXIS: 170
OS_CYLINDER: -0.75
OD_VA1: 20/20
OD_CYLINDER: -1.00
OS_AXIS: 170
OD_SPHERE: +0.25
OS_CYLINDER: -0.75
OD_CYLINDER: -0.50
OS_VA1: 20/20
OD_ADD: +2.50

## 2023-06-02 ASSESSMENT — AXIALLENGTH_DERIVED
OS_AL: 23.4412
OD_AL: 23.5975
OD_AL: 23.4042
OS_AL: 23.4412
OD_AL: 23.5489
OS_AL: 23.2504

## 2023-06-02 ASSESSMENT — REFRACTION_CURRENTRX
OS_OVR_VA: 20/
OD_VPRISM_DIRECTION: BF
OS_CYLINDER: -0.50
OS_VPRISM_DIRECTION: BF
OD_AXIS: 090
OS_AXIS: 060
OD_SPHERE: +0.50
OD_OVR_VA: 20/
OS_ADD: +2.50
OD_ADD: +2.50
OD_CYLINDER: -0.50
OS_SPHERE: +1.00

## 2023-06-02 ASSESSMENT — TONOMETRY
OD_IOP_MMHG: 17
OS_IOP_MMHG: 16

## 2023-06-02 ASSESSMENT — LID POSITION - DERMATOCHALASIS
OS_DERMATOCHALASIS: LUL 1+
OD_DERMATOCHALASIS: RUL 1+

## 2023-06-02 ASSESSMENT — VISUAL ACUITY
OD_BCVA: 20/50
OS_BCVA: 20/30

## 2023-06-02 ASSESSMENT — REFRACTION_AUTOREFRACTION
OD_AXIS: 100
OS_CYLINDER: -0.75
OD_SPHERE: +0.25
OS_SPHERE: +1.00
OD_CYLINDER: -0.75
OS_AXIS: 170

## 2023-06-02 ASSESSMENT — CONFRONTATIONAL VISUAL FIELD TEST (CVF)
OS_FINDINGS: FULL
OD_FINDINGS: FULL

## 2023-06-02 ASSESSMENT — SPHEQUIV_DERIVED
OS_SPHEQUIV: 0.625
OS_SPHEQUIV: 0.625
OD_SPHEQUIV: 0.25
OD_SPHEQUIV: -0.125
OD_SPHEQUIV: -0.25
OS_SPHEQUIV: 1.125

## 2023-06-02 ASSESSMENT — LID EXAM ASSESSMENTS
OD_BLEPHARITIS: RLL RUL
OS_BLEPHARITIS: LLL LUL

## 2023-06-05 ENCOUNTER — APPOINTMENT (OUTPATIENT)
Dept: PAIN MANAGEMENT | Facility: CLINIC | Age: 78
End: 2023-06-05
Payer: COMMERCIAL

## 2023-06-05 VITALS — BODY MASS INDEX: 27.74 KG/M2 | WEIGHT: 183 LBS | HEIGHT: 68 IN

## 2023-06-05 PROCEDURE — 73502 X-RAY EXAM HIP UNI 2-3 VIEWS: CPT

## 2023-06-05 PROCEDURE — 99204 OFFICE O/P NEW MOD 45 MIN: CPT

## 2023-06-05 RX ORDER — DICLOFENAC SODIUM 20 MG/G
2 SOLUTION TOPICAL
Qty: 224 | Refills: 2 | Status: ACTIVE | COMMUNITY
Start: 2023-06-05 | End: 1900-01-01

## 2023-06-05 NOTE — PHYSICAL EXAM
[NL (90)] : forward flexion 90 degrees [Extension] : extension [Left] : left hip [5___] : adduction 5[unfilled]/5 [FreeTextEntry8] : left worse than right  [de-identified] : extension 20 degrees [] : no groin tenderness [de-identified] : external rotation 10 degrees [TWNoteComboBox6] : internal rotation 0 degrees

## 2023-06-05 NOTE — ASSESSMENT
[FreeTextEntry1] : After discussing various treatment options with the patient including but not limited to oral medications, physical therapy, exercise, modalities as well as interventional spinal injections, we have decided with the following plan:\par \par 1) looking for SIJ fusion. \par \par 2) consider left hip injection \par \par 3) I advised JESUS that the NSAID should be taken with food.  In addition while taking the prescribed NSAID, no over the counter or other NSAIDs should be used, such as ibuprofen (Motrin or Advil) or naproxen (Aleve) as this can cause stomach upset or other side effects.  If needed for fever or breakthrough pain Tylenol can be used.

## 2023-06-05 NOTE — HISTORY OF PRESENT ILLNESS
[Lower back] : lower back [8] : 8 [6] : 6 [Dull/Aching] : dull/aching [Sharp] : sharp [] : yes [Constant] : constant [Household chores] : household chores [Leisure] : leisure [Social interactions] : social interactions [Rest] : rest [Sitting] : sitting [Standing] : standing [Walking] : walking [Bending forward] : bending forward [Stairs] : stairs [de-identified] : 06/05/2023 : Patient presents for initial evaluation. 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 Has been seeing Dr. Dejesus and had numerous injections both into the back  and the SIJ. He did get good relief with the SIJ injections however the cortisone would make his sugars go up. (His insurance has changed) PT has not helped. acupuncture did not help. \par \par Subjective Weakness: Yes\par Numbness/Tingling: Yes- hip and back\par Bladder/Bowel dysfunction: No\par Treatments Tried:  PT, injections\par \par Attempted modalities for current pain complaint:\par See above:\par Medications: No\par \par Injections: Yes\par SIJ injections and lumbar RFA's. \par \par Previous Spine Surgery: Laminectomy and Discectomy L5/S1 - 3 years ago\par \par Imaging:\par MRI Lumbar Spine (7/13/22) stand up: l3/4 bulge and FA, L4/5 broad bulge with FA, L5/s1 grade 1 lithesis with FA and NF stenosis. \par Left hip 2021: medial hip arthrosis, fraying of the labrum with joint effusion\par   [FreeTextEntry1] : hips

## 2023-06-08 ENCOUNTER — ASC (OUTPATIENT)
Dept: URBAN - METROPOLITAN AREA SURGERY 8 | Facility: SURGERY | Age: 78
Setting detail: OPHTHALMOLOGY
End: 2023-06-08
Payer: COMMERCIAL

## 2023-06-08 DIAGNOSIS — H25.12: ICD-10-CM

## 2023-06-08 PROCEDURE — 66984 XCAPSL CTRC RMVL W/O ECP: CPT | Performed by: OPHTHALMOLOGY

## 2023-06-09 ENCOUNTER — RX ONLY (RX ONLY)
Age: 78
End: 2023-06-09

## 2023-06-09 ENCOUNTER — OFFICE (OUTPATIENT)
Dept: URBAN - METROPOLITAN AREA CLINIC 1 | Facility: CLINIC | Age: 78
Setting detail: OPHTHALMOLOGY
End: 2023-06-09
Payer: COMMERCIAL

## 2023-06-09 ENCOUNTER — APPOINTMENT (OUTPATIENT)
Dept: ORTHOPEDIC SURGERY | Facility: CLINIC | Age: 78
End: 2023-06-09
Payer: COMMERCIAL

## 2023-06-09 VITALS
SYSTOLIC BLOOD PRESSURE: 146 MMHG | DIASTOLIC BLOOD PRESSURE: 73 MMHG | BODY MASS INDEX: 27.74 KG/M2 | HEIGHT: 68 IN | WEIGHT: 183 LBS

## 2023-06-09 DIAGNOSIS — Z96.1: ICD-10-CM

## 2023-06-09 PROCEDURE — 99024 POSTOP FOLLOW-UP VISIT: CPT

## 2023-06-09 PROCEDURE — 99214 OFFICE O/P EST MOD 30 MIN: CPT | Mod: 25

## 2023-06-09 PROCEDURE — 73564 X-RAY EXAM KNEE 4 OR MORE: CPT | Mod: 50

## 2023-06-09 PROCEDURE — 20610 DRAIN/INJ JOINT/BURSA W/O US: CPT | Mod: RT

## 2023-06-09 ASSESSMENT — AXIALLENGTH_DERIVED
OD_AL: 23.6897
OS_AL: 23.4384
OD_AL: 23.3987
OS_AL: 23.2951
OD_AL: 23.4949
OS_AL: 23.4866

## 2023-06-09 ASSESSMENT — KERATOMETRY
OS_K2POWER_DIOPTERS: 43.50
OD_K1POWER_DIOPTERS: 43.50
OD_AXISANGLE_DEGREES: 90
OD_K2POWER_DIOPTERS: 43.50
OS_K1POWER_DIOPTERS: 42.75
OS_AXISANGLE_DEGREES: 84
METHOD_AUTO_MANUAL: AUTO

## 2023-06-09 ASSESSMENT — SPHEQUIV_DERIVED
OS_SPHEQUIV: 0.75
OS_SPHEQUIV: 0.625
OD_SPHEQUIV: 0.25
OD_SPHEQUIV: -0.25
OS_SPHEQUIV: 1.125
OD_SPHEQUIV: 0.5

## 2023-06-09 ASSESSMENT — REFRACTION_MANIFEST
OD_VA1: 20/40
OD_ADD: +2.50
OS_CYLINDER: -0.75
OD_SPHERE: +0.25
OS_AXIS: 170
OD_SPHERE: +0.50
OS_VA1: 20/20
OU_VA: 20/25+3
OS_AXIS: 170
OU_VA: 20/20-2
OD_VA1: 20/20
OS_CYLINDER: -0.75
OS_ADD: +2.50
OS_VA1: 20/25+2
OS_SPHERE: +1.50
OS_SPHERE: +1.00
OD_AXIS: 105
OD_CYLINDER: -1.00
OD_AXIS: 110
OD_CYLINDER: -0.50

## 2023-06-09 ASSESSMENT — LID EXAM ASSESSMENTS
OS_BLEPHARITIS: LLL LUL
OD_BLEPHARITIS: RLL RUL

## 2023-06-09 ASSESSMENT — REFRACTION_CURRENTRX
OD_CYLINDER: -0.50
OD_OVR_VA: 20/
OS_VPRISM_DIRECTION: BF
OD_AXIS: 090
OS_SPHERE: +1.00
OS_ADD: +2.50
OD_SPHERE: +0.50
OD_VPRISM_DIRECTION: BF
OS_OVR_VA: 20/
OS_CYLINDER: -0.50
OS_AXIS: 060
OD_ADD: +2.50

## 2023-06-09 ASSESSMENT — REFRACTION_AUTOREFRACTION
OS_AXIS: 169
OS_SPHERE: +1.00
OD_SPHERE: +0.75
OD_CYLINDER: -0.50
OD_AXIS: 99
OS_CYLINDER: -0.50

## 2023-06-09 ASSESSMENT — VISUAL ACUITY
OD_BCVA: 20/25-2
OS_BCVA: 20/20-2

## 2023-06-09 ASSESSMENT — LID POSITION - DERMATOCHALASIS
OD_DERMATOCHALASIS: RUL 1+
OS_DERMATOCHALASIS: LUL 1+

## 2023-06-09 ASSESSMENT — TONOMETRY
OS_IOP_MMHG: 16
OD_IOP_MMHG: 15

## 2023-06-09 ASSESSMENT — CONFRONTATIONAL VISUAL FIELD TEST (CVF)
OS_FINDINGS: FULL
OD_FINDINGS: FULL

## 2023-06-09 NOTE — HISTORY OF PRESENT ILLNESS
[de-identified] : Mr. JESUS GRAY is a 77 year old male presenting for longstanding bilateral knee pain, right worse than left. Patient notes pain is localized medially and worse with all weightbearing activity including walking long distance and rising from a seated position. Patient has not had injections thus far. He takes OTC NSAIDs as needed. He completed gel injection on 1/4/23. He reports a 70% improvement from this. He also swims daily which is helping. Patient notes that pain and stiffness are starting to come back for the right knee. He is hopeful to discuss another injection today.

## 2023-06-09 NOTE — DISCUSSION/SUMMARY
[de-identified] : JESUS GRAY is a 77 year male who presents with bilateral knee varus arthritis, moderate on the left, bone on bone on the right. While the patient may eventually benefit from total knee replacements, he has not yet exhausted nonoperative treatment options. As such, nonoperative treatment options for arthritis were discussed including antiinflammatories, physical therapy, intraarticular cortisone injections, and hyaluronic acid gel injections. Patient received a steroid injection today to the right knee only and tolerated well. He will ice and elevate when home. Follow up in 6-8 weeks.

## 2023-06-09 NOTE — PROCEDURE
[de-identified] : Using sterile technique, 2cc of depomedrol 40mg/ml, 4cc of 1% plain lidocaine, and 2 cc 0.25% marcaine was drawn up into a sterile syringe. The right knee was then sterilely prepped with chlorhexidine. Ethyl chloride spray was used to anesthetize the skin and subQ tissue. The depomedrol/lidocaine/marcaine mixture was then injected into the knee joint in the anterolateral position. The patient tolerated the procedure well without difficulty. The patient was given instructions on the use of ice and anti-inflammatories post injection site soreness.

## 2023-06-09 NOTE — PHYSICAL EXAM
[de-identified] : The patient appears well nourished  and in no apparent distress.  The patient is alert and oriented to person, place, and time.   Affect and mood appear normal. The head is normocephalic and atraumatic.  The eyes reveal normal sclera and extra ocular muscles are intact. The tongue is midline with no apparent lesions.  Skin shows normal turgor with no evidence of eczema or psoriasis.  No respiratory distress noted.  Sensation grossly intact.		  [de-identified] : Exam of the right knee shows 5 degrees of varus which is fully correctable, -7 to 122 degrees of flexion measured with a goniometer. \par Exam of the left knee shows 5 degrees of varus which is partially correctable, 0 to 138 degrees of flexion measured with a goniometer. \par 5/5 motor strength bilaterally distally. Sensation intact distally.  [de-identified] : Xray 4 views of the right knee demonstrate bone on bone varus arthritis. \par X-ray 4 views of the left knee demonstrate moderate medial compartment joint space narrowing.

## 2023-06-19 ENCOUNTER — OFFICE (OUTPATIENT)
Dept: URBAN - METROPOLITAN AREA CLINIC 6 | Facility: CLINIC | Age: 78
Setting detail: OPHTHALMOLOGY
End: 2023-06-19
Payer: COMMERCIAL

## 2023-06-19 DIAGNOSIS — Z96.1: ICD-10-CM

## 2023-06-19 PROCEDURE — 99024 POSTOP FOLLOW-UP VISIT: CPT

## 2023-06-19 ASSESSMENT — REFRACTION_AUTOREFRACTION
OD_AXIS: 100
OS_SPHERE: +0.50
OD_SPHERE: +0.75
OS_AXIS: 150
OD_CYLINDER: -0.50
OS_CYLINDER: -0.25

## 2023-06-19 ASSESSMENT — SPHEQUIV_DERIVED
OS_SPHEQUIV: 0.625
OS_SPHEQUIV: 1.125
OS_SPHEQUIV: 0.375
OD_SPHEQUIV: 0.5
OD_SPHEQUIV: -0.25
OD_SPHEQUIV: 0.25

## 2023-06-19 ASSESSMENT — KERATOMETRY
OD_K2POWER_DIOPTERS: 43.75
OS_K2POWER_DIOPTERS: 43.25
OD_AXISANGLE_DEGREES: 118
OD_K1POWER_DIOPTERS: 43.50
OS_K1POWER_DIOPTERS: 43.00
OS_AXISANGLE_DEGREES: 61
METHOD_AUTO_MANUAL: AUTO

## 2023-06-19 ASSESSMENT — REFRACTION_CURRENTRX
OD_VPRISM_DIRECTION: BF
OD_CYLINDER: -0.50
OS_VPRISM_DIRECTION: BF
OS_OVR_VA: 20/
OS_SPHERE: +1.00
OD_AXIS: 090
OS_CYLINDER: -0.50
OD_OVR_VA: 20/
OS_ADD: +2.50
OD_ADD: +2.50
OS_AXIS: 060
OD_SPHERE: +0.50

## 2023-06-19 ASSESSMENT — REFRACTION_MANIFEST
OD_AXIS: 110
OS_AXIS: 170
OD_AXIS: 105
OU_VA: 20/25+3
OD_SPHERE: +0.50
OS_VA1: 20/25+2
OD_ADD: +2.50
OS_SPHERE: +1.00
OD_VA1: 20/20
OS_ADD: +2.50
OD_VA1: 20/40
OS_CYLINDER: -0.75
OD_SPHERE: +0.25
OD_CYLINDER: -0.50
OS_VA1: 20/20
OS_CYLINDER: -0.75
OD_CYLINDER: -1.00
OS_AXIS: 170
OU_VA: 20/20-2
OS_SPHERE: +1.50

## 2023-06-19 ASSESSMENT — TONOMETRY
OD_IOP_MMHG: 15
OS_IOP_MMHG: 15

## 2023-06-19 ASSESSMENT — LID POSITION - DERMATOCHALASIS
OS_DERMATOCHALASIS: LUL 1+
OD_DERMATOCHALASIS: RUL 1+

## 2023-06-19 ASSESSMENT — AXIALLENGTH_DERIVED
OS_AL: 23.2951
OS_AL: 23.4866
OD_AL: 23.3536
OD_AL: 23.6435
OD_AL: 23.4495
OS_AL: 23.5835

## 2023-06-19 ASSESSMENT — VISUAL ACUITY
OD_BCVA: 20/20-1
OS_BCVA: 20/20-1

## 2023-06-19 ASSESSMENT — LID EXAM ASSESSMENTS
OS_BLEPHARITIS: LLL LUL
OD_BLEPHARITIS: RLL RUL

## 2023-06-19 ASSESSMENT — CONFRONTATIONAL VISUAL FIELD TEST (CVF)
OS_FINDINGS: FULL
OD_FINDINGS: FULL

## 2023-06-22 ENCOUNTER — APPOINTMENT (OUTPATIENT)
Dept: PAIN MANAGEMENT | Facility: CLINIC | Age: 78
End: 2023-06-22

## 2023-06-23 ENCOUNTER — APPOINTMENT (OUTPATIENT)
Dept: PULMONOLOGY | Facility: CLINIC | Age: 78
End: 2023-06-23

## 2023-07-05 ENCOUNTER — OFFICE (OUTPATIENT)
Dept: URBAN - METROPOLITAN AREA CLINIC 6 | Facility: CLINIC | Age: 78
Setting detail: OPHTHALMOLOGY
End: 2023-07-05
Payer: COMMERCIAL

## 2023-07-05 DIAGNOSIS — Z96.1: ICD-10-CM

## 2023-07-05 PROCEDURE — 99024 POSTOP FOLLOW-UP VISIT: CPT | Performed by: OPHTHALMOLOGY

## 2023-07-05 ASSESSMENT — LID EXAM ASSESSMENTS
OS_BLEPHARITIS: LLL LUL
OD_BLEPHARITIS: RLL RUL

## 2023-07-05 ASSESSMENT — REFRACTION_MANIFEST
OD_VA1: 20/40
OS_CYLINDER: -0.75
OS_AXIS: 170
OD_ADD: +2.50
OS_CYLINDER: -0.75
OD_VA1: 20/20
OS_SPHERE: +1.00
OS_VA1: 20/25+2
OD_CYLINDER: -1.00
OS_AXIS: 170
OS_SPHERE: +1.50
OD_AXIS: 105
OD_SPHERE: +0.25
OU_VA: 20/25+3
OD_CYLINDER: -0.50
OU_VA: 20/20-2
OS_ADD: +2.50
OD_SPHERE: +0.50
OS_VA1: 20/20
OD_AXIS: 110

## 2023-07-05 ASSESSMENT — KERATOMETRY
OS_K1POWER_DIOPTERS: 43.00
OS_AXISANGLE_DEGREES: 077
METHOD_AUTO_MANUAL: AUTO
OD_AXISANGLE_DEGREES: 090
OD_K1POWER_DIOPTERS: 43.75
OD_K2POWER_DIOPTERS: 43.75
OS_K2POWER_DIOPTERS: 43.50

## 2023-07-05 ASSESSMENT — REFRACTION_AUTOREFRACTION
OS_SPHERE: +0.75
OS_AXIS: 155
OD_SPHERE: +0.75
OD_AXIS: 082
OS_CYLINDER: -0.50
OD_CYLINDER: -0.50

## 2023-07-05 ASSESSMENT — AXIALLENGTH_DERIVED
OD_AL: 23.3087
OS_AL: 23.4412
OD_AL: 23.5975
OD_AL: 23.4042
OS_AL: 23.4894
OS_AL: 23.2504

## 2023-07-05 ASSESSMENT — SPHEQUIV_DERIVED
OS_SPHEQUIV: 0.5
OD_SPHEQUIV: 0.25
OS_SPHEQUIV: 0.625
OS_SPHEQUIV: 1.125
OD_SPHEQUIV: -0.25
OD_SPHEQUIV: 0.5

## 2023-07-05 ASSESSMENT — REFRACTION_CURRENTRX
OS_OVR_VA: 20/
OS_SPHERE: +1.00
OS_VPRISM_DIRECTION: BF
OD_OVR_VA: 20/
OD_SPHERE: +0.50
OD_ADD: +2.50
OS_ADD: +2.50
OD_AXIS: 090
OS_AXIS: 060
OD_VPRISM_DIRECTION: BF
OD_CYLINDER: -0.50
OS_CYLINDER: -0.50

## 2023-07-05 ASSESSMENT — CONFRONTATIONAL VISUAL FIELD TEST (CVF)
OD_FINDINGS: FULL
OS_FINDINGS: FULL

## 2023-07-05 ASSESSMENT — VISUAL ACUITY
OS_BCVA: 20/20-1
OD_BCVA: 20/25+2

## 2023-07-05 ASSESSMENT — LID POSITION - DERMATOCHALASIS
OD_DERMATOCHALASIS: RUL 1+
OS_DERMATOCHALASIS: LUL 1+

## 2023-07-05 ASSESSMENT — TONOMETRY
OS_IOP_MMHG: 16
OD_IOP_MMHG: 16

## 2023-07-13 ENCOUNTER — APPOINTMENT (OUTPATIENT)
Dept: PAIN MANAGEMENT | Facility: CLINIC | Age: 78
End: 2023-07-13

## 2023-07-20 ENCOUNTER — OFFICE (OUTPATIENT)
Dept: URBAN - METROPOLITAN AREA CLINIC 6 | Facility: CLINIC | Age: 78
Setting detail: OPHTHALMOLOGY
End: 2023-07-20
Payer: COMMERCIAL

## 2023-07-20 DIAGNOSIS — Z96.1: ICD-10-CM

## 2023-07-20 PROCEDURE — 99024 POSTOP FOLLOW-UP VISIT: CPT

## 2023-07-20 ASSESSMENT — TONOMETRY
OS_IOP_MMHG: 14
OD_IOP_MMHG: 14

## 2023-07-20 ASSESSMENT — REFRACTION_MANIFEST
OS_ADD: +2.50
OD_ADD: +2.50
OD_AXIS: 110
OD_VA1: 20/20
OD_CYLINDER: -0.50
OD_ADD: +2.50
OD_VA1: 20/20
OS_AXIS: 170
OS_SPHERE: PLANO
OS_ADD: +2.50
OD_CYLINDER: SPH
OS_CYLINDER: SPH
OU_VA: 20/20-2
OS_VA1: 20/20
OD_SPHERE: +0.50
OS_CYLINDER: -0.75
OS_VA1: 20/20
OS_SPHERE: +1.50
OD_SPHERE: PLANO

## 2023-07-20 ASSESSMENT — REFRACTION_AUTOREFRACTION
OS_SPHERE: +0.75
OD_AXIS: 095
OD_SPHERE: +1.00
OD_CYLINDER: -0.75
OS_CYLINDER: -0.50
OS_AXIS: 130

## 2023-07-20 ASSESSMENT — KERATOMETRY
OS_AXISANGLE_DEGREES: 075
OD_K1POWER_DIOPTERS: 43.50
OS_K2POWER_DIOPTERS: 43.25
OS_K1POWER_DIOPTERS: 42.75
OD_AXISANGLE_DEGREES: 178
METHOD_AUTO_MANUAL: AUTO
OD_K2POWER_DIOPTERS: 44.00

## 2023-07-20 ASSESSMENT — AXIALLENGTH_DERIVED
OD_AL: 23.2613
OD_AL: 23.4042
OS_AL: 23.5808
OS_AL: 23.34

## 2023-07-20 ASSESSMENT — REFRACTION_CURRENTRX
OD_OVR_VA: 20/
OD_ADD: +2.50
OD_VPRISM_DIRECTION: BF
OS_OVR_VA: 20/
OS_SPHERE: +1.00
OS_VPRISM_DIRECTION: BF
OS_CYLINDER: -0.50
OD_AXIS: 090
OD_SPHERE: +0.50
OD_CYLINDER: -0.50
OS_ADD: +2.50
OS_AXIS: 060

## 2023-07-20 ASSESSMENT — SPHEQUIV_DERIVED
OS_SPHEQUIV: 1.125
OD_SPHEQUIV: 0.625
OS_SPHEQUIV: 0.5
OD_SPHEQUIV: 0.25

## 2023-07-20 ASSESSMENT — CONFRONTATIONAL VISUAL FIELD TEST (CVF)
OD_FINDINGS: FULL
OS_FINDINGS: FULL

## 2023-07-20 ASSESSMENT — LID POSITION - DERMATOCHALASIS
OS_DERMATOCHALASIS: LUL 1+
OD_DERMATOCHALASIS: RUL 1+

## 2023-07-20 ASSESSMENT — VISUAL ACUITY
OS_BCVA: 20/20
OD_BCVA: 20/20

## 2023-07-20 ASSESSMENT — LID EXAM ASSESSMENTS
OD_BLEPHARITIS: RLL RUL
OS_BLEPHARITIS: LLL LUL

## 2023-08-03 ENCOUNTER — APPOINTMENT (OUTPATIENT)
Dept: PAIN MANAGEMENT | Facility: CLINIC | Age: 78
End: 2023-08-03

## 2023-08-17 ENCOUNTER — APPOINTMENT (OUTPATIENT)
Dept: NEUROSURGERY | Facility: CLINIC | Age: 78
End: 2023-08-17
Payer: COMMERCIAL

## 2023-08-17 ENCOUNTER — EMERGENCY (EMERGENCY)
Facility: HOSPITAL | Age: 78
LOS: 1 days | Discharge: HOME | End: 2023-08-17
Attending: EMERGENCY MEDICINE
Payer: OTHER GOVERNMENT

## 2023-08-17 VITALS
OXYGEN SATURATION: 98 % | DIASTOLIC BLOOD PRESSURE: 62 MMHG | WEIGHT: 167.11 LBS | TEMPERATURE: 98 F | SYSTOLIC BLOOD PRESSURE: 175 MMHG | HEIGHT: 71 IN | HEART RATE: 65 BPM | RESPIRATION RATE: 20 BRPM

## 2023-08-17 VITALS
BODY MASS INDEX: 23.52 KG/M2 | SYSTOLIC BLOOD PRESSURE: 180 MMHG | HEIGHT: 71 IN | HEART RATE: 67 BPM | TEMPERATURE: 98 F | WEIGHT: 168 LBS | OXYGEN SATURATION: 99 % | DIASTOLIC BLOOD PRESSURE: 75 MMHG

## 2023-08-17 PROCEDURE — 99213 OFFICE O/P EST LOW 20 MIN: CPT

## 2023-08-17 PROCEDURE — 99282 EMERGENCY DEPT VISIT SF MDM: CPT

## 2023-08-17 PROCEDURE — 99283 EMERGENCY DEPT VISIT LOW MDM: CPT

## 2023-08-17 NOTE — ED PROVIDER NOTE - NSCAREINITIATED _GEN_ER
Pt discharged home. Pt acting age appropriately, respirations regular and unlabored, cap refill less than two seconds. Skin pink, dry and warm. Lungs clear bilaterally. No further complaints at this time. Patient verbalized understanding of discharge paperwork and has no further questions at this time. Education provided about continuation of care, follow up care and medication administration, follow up with PCP as needed, motrin as needed for pain, return for worsening symptoms. Patient able to provided teach back about discharge instructions. Vicky Cartwright)

## 2023-08-17 NOTE — ED PROVIDER NOTE - NS ED ATTENDING STATEMENT MOD
This was a shared visit with the [facility]. I reviewed and verified the documentation and independently performed the documented:

## 2023-08-17 NOTE — ED ADULT TRIAGE NOTE - CHIEF COMPLAINT QUOTE
Patient arrived to ED today with c/o back pains.  Patient reports he has had two operations on his back and the pain is worse and worse.

## 2023-08-17 NOTE — CONSULT NOTE ADULT - SUBJECTIVE AND OBJECTIVE BOX
Patient is a 77y old  Male who presents with a chief complaint of   HPI:    HISTORY OF PRESENT ILLNESS:   75 y.o. male w/ hx of heart murmur of moderate aortic stenosis, HTN, HLD, Lumbar frx at age 19 while in the , previous Lspine surgery x 2, presents from outpatient office w/ c/o worsening back pain w/ radiation    PAST MEDICAL & SURGICAL HISTORY:    FAMILY HISTORY:      SOCIAL HISTORY:  Tobacco Use:  EtOH use:   Substance:    Allergies    No Known Allergies    Intolerances        REVIEW OF SYSTEMS  CONSTITUTIONAL: No fever, weight loss, or fatigue  EYES: No eye pain, visual disturbances, or discharge  ENMT:  No difficulty hearing, tinnitus, vertigo; No sinus or throat pain  NECK: No pain or stiffness  BREASTS: No pain, masses, or nipple discharge  RESPIRATORY: No cough, wheezing, chills or hemoptysis; No shortness of breath  CARDIOVASCULAR: No chest pain, palpitations, dizziness, or leg swelling  GASTROINTESTINAL: No abdominal or epigastric pain. No nausea, vomiting, or hematemesis; No diarrhea or constipation. No melena or hematochezia.  GENITOURINARY: No dysuria, frequency, hematuria, or incontinence  NEUROLOGICAL: No headaches, memory loss, loss of strength, numbness, or tremors  SKIN: No itching, burning, rashes, or lesions   LYMPH NODES: No enlarged glands  ENDOCRINE: No heat or cold intolerance; No hair loss  MUSCULOSKELETAL: No joint pain or swelling; No muscle, back, or extremity pain  PSYCHIATRIC: No depression, anxiety, mood swings, or difficulty sleeping  HEME/LYMPH: No easy bruising, or bleeding gums  ALLERY AND IMMUNOLOGIC: No hives or eczema    HOME MEDICATIONS:  Home Medications:      MEDICATIONS:  Antibiotics:    Neuro:    Anticoagulation:    OTHER:    IVF:      Vital Signs Last 24 Hrs  T(C): 36.9 (17 Aug 2023 12:03), Max: 36.9 (17 Aug 2023 12:03)  T(F): 98.5 (17 Aug 2023 12:03), Max: 98.5 (17 Aug 2023 12:03)  HR: 65 (17 Aug 2023 12:03) (65 - 65)  BP: 175/62 (17 Aug 2023 12:03) (175/62 - 175/62)  BP(mean): --  RR: 20 (17 Aug 2023 12:03) (20 - 20)  SpO2: 98% (17 Aug 2023 12:03) (98% - 98%)    Parameters below as of 17 Aug 2023 12:03  Patient On (Oxygen Delivery Method): room air          PHYSICAL EXAM:  GENERAL: NAD, well-groomed, well-developed  HEAD:  Atraumatic, normocephalic  DRAINS:   WOUND: Dressing clean dry intact; well healed  SHUNT: easily compressible and refills  EYES: Conjunctiva and sclera clear; corneal reflex intact  ENMT: No tonsillar erythema, exudates, or enlargement; moist mucous membranes, good dentition, no lesions  NECK: Supple, no JVD, dormal thyroid  CRIS COMA SCORE: E- V- M- =       E: 4= opens eyes spontaneously 3= to voice 2= to noxious 1= no opening       V: 5= oriented 4= confused 3= inappropriate words 2= incomprehensible sounds 1= nonverbal 1T= intubated       M: 6= follows commands 5= localizes 4= withdraws 3= flexor posturing 2= extensor posturing 1= no movement  MENTAL STATUS: AAO x3; Awake/Comatose; Opens eyes spontaneously/to voice/to light touch/to noxious stimuli; Appropriately conversant without aphasia/Nonverbal; following simple commands/mimicking/not following commands  CRANIAL NERVES: Visual acuity normal for age, visual fields full to confrontation, PERRL. EOMI without nystagmus. Facial sensation intact V1-3 distribution b/l. Face symmetric w/ normal eye closure and smile, tongue midline. Hearing grossly intact. Speech clear. Head turning and shoulder shrug intact.   REFLEXES: Doll's eyes positive. Blinks to threat. Gag reflex intact. No pronator drift; DTRs 2+ intact and symmetric; negative Johnson's b/l; negative clonus b/l; no upper extremity dysmetria  MOTOR: strength 5/5 b/l upper and lower extremities  Uppers     Delt     Bicep     Tricep     HG  R                5/5       5/5         5/5         5/5  L                5/5       5/5         5/5         5/5  Lowers      HF     KF      KE     PF     DF     EHL  R             5/5     5/5     5/5    5/5   5/5    5/5  L              5/5    5/5      5/5    5/5   5/5    5/5  SENSATION: grossly intact to light touch all extremities  COORDINATION: Gait intact; rapid alternating movements intact b/l upper extremities; no upper extremity dysmetria  SENSATION: grossly intact to light touch all extremities  MUSCLE STRETCH REFLEXES: DTRs 2+ intact and symmetric; no Johnson's b/l; no clonus b/l  PLANTAR: upgoing/downgoing/mute (Babinski)  CHEST/LUNG: Clear to auscultation bilaterally; no rales, rhonchi, wheezing, or rubs  HEART: +S1/+S2; Regular rate and rhythm; no murmurs, rubs, or gallops  ABDOMEN: Soft, nontender, nondistended; bowel sounds present all four quadrants  EXTREMITIES:  2+ peripheral pulses, no clubbing, cyanosis, or edema  LYMPH: No lymphadenopathy noted  SKIN: Warm, dry; no rashes or lesions     HISTORY OF PRESENT ILLNESS:   75 y.o. male w/ hx of heart murmur of moderate aortic stenosis, HTN, HLD, Lumbar frx at age 19 while in the , previous Lspine surgery x 2, presents from outpatient office w/ c/o worsening back pain w/ radiation    PAST MEDICAL & SURGICAL HISTORY:    FAMILY HISTORY:      SOCIAL HISTORY:  Tobacco Use:  EtOH use:   Substance:    Allergies    No Known Allergies    Intolerances        REVIEW OF SYSTEMS  CONSTITUTIONAL: No fever, weight loss, or fatigue  EYES: No eye pain, visual disturbances, or discharge  ENMT:  No difficulty hearing, tinnitus, vertigo; No sinus or throat pain  NECK: No pain or stiffness  BREASTS: No pain, masses, or nipple discharge  RESPIRATORY: No cough, wheezing, chills or hemoptysis; No shortness of breath  CARDIOVASCULAR: No chest pain, palpitations, dizziness, or leg swelling  GASTROINTESTINAL: No abdominal or epigastric pain. No nausea, vomiting, or hematemesis; No diarrhea or constipation. No melena or hematochezia.  GENITOURINARY: No dysuria, frequency, hematuria, or incontinence  NEUROLOGICAL: No headaches, memory loss, loss of strength, numbness, or tremors  SKIN: No itching, burning, rashes, or lesions   LYMPH NODES: No enlarged glands  ENDOCRINE: No heat or cold intolerance; No hair loss  MUSCULOSKELETAL: No joint pain or swelling; No muscle, back, or extremity pain  PSYCHIATRIC: No depression, anxiety, mood swings, or difficulty sleeping  HEME/LYMPH: No easy bruising, or bleeding gums  ALLERY AND IMMUNOLOGIC: No hives or eczema    HOME MEDICATIONS:  Home Medications:      MEDICATIONS:  Antibiotics:    Neuro:    Anticoagulation:    OTHER:    IVF:      Vital Signs Last 24 Hrs  T(C): 36.9 (17 Aug 2023 12:03), Max: 36.9 (17 Aug 2023 12:03)  T(F): 98.5 (17 Aug 2023 12:03), Max: 98.5 (17 Aug 2023 12:03)  HR: 65 (17 Aug 2023 12:03) (65 - 65)  BP: 175/62 (17 Aug 2023 12:03) (175/62 - 175/62)  BP(mean): --  RR: 20 (17 Aug 2023 12:03) (20 - 20)  SpO2: 98% (17 Aug 2023 12:03) (98% - 98%)    Parameters below as of 17 Aug 2023 12:03  Patient On (Oxygen Delivery Method): room air          PHYSICAL EXAM:  GENERAL: NAD, well-groomed, well-developed  HEAD:  Atraumatic, normocephalic  DRAINS:   WOUND: Dressing clean dry intact; well healed  SHUNT: easily compressible and refills  EYES: Conjunctiva and sclera clear; corneal reflex intact  ENMT: No tonsillar erythema, exudates, or enlargement; moist mucous membranes, good dentition, no lesions  NECK: Supple, no JVD, dormal thyroid  CRIS COMA SCORE: E- V- M- =       E: 4= opens eyes spontaneously 3= to voice 2= to noxious 1= no opening       V: 5= oriented 4= confused 3= inappropriate words 2= incomprehensible sounds 1= nonverbal 1T= intubated       M: 6= follows commands 5= localizes 4= withdraws 3= flexor posturing 2= extensor posturing 1= no movement  MENTAL STATUS: AAO x3; Awake/Comatose; Opens eyes spontaneously/to voice/to light touch/to noxious stimuli; Appropriately conversant without aphasia/Nonverbal; following simple commands/mimicking/not following commands  CRANIAL NERVES: Visual acuity normal for age, visual fields full to confrontation, PERRL. EOMI without nystagmus. Facial sensation intact V1-3 distribution b/l. Face symmetric w/ normal eye closure and smile, tongue midline. Hearing grossly intact. Speech clear. Head turning and shoulder shrug intact.   REFLEXES: Doll's eyes positive. Blinks to threat. Gag reflex intact. No pronator drift; DTRs 2+ intact and symmetric; negative Johnson's b/l; negative clonus b/l; no upper extremity dysmetria  MOTOR: strength 5/5 b/l upper and lower extremities  Uppers     Delt     Bicep     Tricep     HG  R                5/5       5/5         5/5         5/5  L                5/5       5/5         5/5         5/5  Lowers      HF     KF      KE     PF     DF     EHL  R             5/5     5/5     5/5    5/5   5/5    5/5  L              5/5    5/5      5/5    5/5   5/5    5/5  SENSATION: grossly intact to light touch all extremities  COORDINATION: Gait intact; rapid alternating movements intact b/l upper extremities; no upper extremity dysmetria  SENSATION: grossly intact to light touch all extremities  MUSCLE STRETCH REFLEXES: DTRs 2+ intact and symmetric; no Johnson's b/l; no clonus b/l  PLANTAR: upgoing/downgoing/mute (Babinski)  CHEST/LUNG: Clear to auscultation bilaterally; no rales, rhonchi, wheezing, or rubs  HEART: +S1/+S2; Regular rate and rhythm; no murmurs, rubs, or gallops  ABDOMEN: Soft, nontender, nondistended; bowel sounds present all four quadrants  EXTREMITIES:  2+ peripheral pulses, no clubbing, cyanosis, or edema  LYMPH: No lymphadenopathy noted  SKIN: Warm, dry; no rashes or lesions

## 2023-08-17 NOTE — CONSULT NOTE ADULT - ASSESSMENT
75 y.o. male w/ hx of heart murmur of moderate aortic stenosis, HTN, HLD, Lumbar frx at age 19 while in the , previous Lspine surgery x 2, presents from outpatient office w/ c/o worsening back pain w/ radiation      -MRI L spine w/ w/o  -flex ex L spine   -pain control as needed, avoid over sedation  -goal systolic  to 150  -attending to see

## 2023-08-17 NOTE — HISTORY OF PRESENT ILLNESS
[> 3 months] : more  than 3 months [FreeTextEntry1] : LBP with left leg pain [de-identified] : JESUS GRAY is a 77 year old male who presents for neurosurgical evaluation of lower back pain with left leg radiculopathy. He states that 3 weeks ago he awoke with pain in his lower back with numbness and tingling that shoots down the side of his whole left leg into his whole foot. He states the back pain is currently worse than the leg pain and he would be unable to walk a few blocks because the pain in his back would get severe. He had 2 back surgeries 2 years ago L5 laminectomy and discectomy followed by the same surgery 6 months later. He is having the same symptoms now from before his back surgeries. He denies any incontinence or weakness, but does endorse imbalance. States pain is a 10/10 and is constant with no alleviating factors. He has tried radiofrequency and trigger point injections 2 weeks ago with no relief. He has taken methocarbamol, Tylenol, and ibuprofen with no relief. He did not bring CT disc or reports to this visit.

## 2023-08-17 NOTE — ED ADULT NURSE NOTE - OBJECTIVE STATEMENT
pt triaged, treated and dispo by provider, pt verbalized understanding of discharge instructions, pt medicated prior to discharge, please refer to provider notes.

## 2023-08-17 NOTE — ED PROVIDER NOTE - OBJECTIVE STATEMENT
77-year-old male presents to ED with chronic lower back pain.  Patient explained that he may have had recurrent back pain since his surgery.  Patient said he had a laminectomy and discectomy 2 years ago performed and his pain has been recurrent.  Patient explained that he had CAT scan done 2 days ago which was negative.  Patient has since follow-up with an orthopedic physician who told him he will work him up and eventually an MRI.  Patient said he called the orthopedic today and complained of worsening pain and was told to come to the ED for evaluation.  Patient denies any fever, lower extremity weakness, fecal or urine incontinence,.  Patient explained that if I cannot perform an MRI on him he would like to leave.

## 2023-08-17 NOTE — ED PROVIDER NOTE - PATIENT PORTAL LINK FT
You can access the FollowMyHealth Patient Portal offered by [facility] by registering at the following website: [email or web address]. By joining [facility]’s FollowMyHealth portal, you will also be able to view your health information using other applications (apps) compatible with our system.

## 2023-08-17 NOTE — PHYSICAL EXAM
[General Appearance - Alert] : alert [General Appearance - In No Acute Distress] : in no acute distress [General Appearance - Well Nourished] : well nourished [Oriented To Time, Place, And Person] : oriented to person, place, and time [Impaired Insight] : insight and judgment were intact [Affect] : the affect was normal [Motor Tone] : muscle tone was normal in all four extremities [No Tenderness to Palpation] : no spine tenderness on palpation [Antalgic] : antalgic [Able to toe walk] : the patient was able to toe walk [Sclera] : the sclera and conjunctiva were normal [Neck Appearance] : the appearance of the neck was normal [] : no respiratory distress [No Spinal Tenderness] : no spinal tenderness [Involuntary Movements] : no involuntary movements were seen [FreeTextEntry6] : Bilateral LE weakness 4/5 [Able to heel walk] : the patient was not able to heel walk

## 2023-08-17 NOTE — ED PROVIDER NOTE - CLINICAL SUMMARY MEDICAL DECISION MAKING FREE TEXT BOX
77-year-old male presents to ED with chronic lower back pain.  Patient explained that he may have had recurrent back pain since his surgery.  Patient said he had a laminectomy and discectomy 2 years ago performed and his pain has been recurrent.  Patient explained that he had CAT scan done 2 days ago which was negative.  Patient has since follow-up with an orthopedic physician who told him he will work him up and eventually an MRI.  Patient said he called the orthopedic today and complained of worsening pain and was told to come to the ED for evaluation. Examination : Negative straight leg raise, No lower extremity weakness on examination. No paraesthesia non examination.   Pt states that he came to the ED for an MRI and if I cannot get one today he would like to leave.

## 2023-08-17 NOTE — ASSESSMENT
[FreeTextEntry1] : Patient with above history and no imaging. Lower back pain with left side radiculopathy, 10/10 with no alleviating factors. He states that the pain is the same pain that had him do 2 back surgeries prior. He has failed conservative pain management treatments and states the pain is progressively worsening. He would like to go to the emergency room.  Plan: GO to the ED at [facility] Patient knows to call the office if there are any new or worsening symptoms.  All questions and concerns answered and addressed in detail to patient's complete satisfaction. Patient verbalized understanding and agreed to plan.

## 2023-08-17 NOTE — ED PROVIDER NOTE - ATTENDING APP SHARED VISIT CONTRIBUTION OF CARE
Ricky Rosas is a 50year old male presenting to follow up on arm pain. Currently is not taking any medications. Was prescribed [place] on 10/4 but states he is not using it. Denies known Latex allergy or symptoms of Latex sensitivity. Health Maintenance Due   Topic Date Due   â¢ Depression Screening  03/04/1980   â¢ Pneumococcal 19-64 Medium Risk (1 of 1 - PPSV23) 03/04/1987   â¢ Colorectal Cancer Screening-Colonoscopy  03/04/2018   â¢ Influenza Vaccine (1) 07/01/2018       Patient is due for topics as listed above but declines Immunization(s) Influenza and Pneumococcal at this time. Atraumatic low back pain without any signs, symptoms, or historical factors concerning for acute cord pathology. No indication for emergent MRI. Multimodal analgesia, outpatient follow up with primary care and/or spine clinic as needed.

## 2023-08-19 ENCOUNTER — OFFICE (OUTPATIENT)
Dept: URBAN - METROPOLITAN AREA CLINIC 6 | Facility: CLINIC | Age: 78
Setting detail: OPHTHALMOLOGY
End: 2023-08-19
Payer: COMMERCIAL

## 2023-08-19 DIAGNOSIS — Z96.1: ICD-10-CM

## 2023-08-19 DIAGNOSIS — T15.12XA: ICD-10-CM

## 2023-08-19 PROBLEM — H01.002 BLEPHARITIS; RIGHT UPPER LID, RIGHT LOWER LID, LEFT UPPER LID, LEFT LOWER LID: Status: ACTIVE | Noted: 2023-08-19

## 2023-08-19 PROBLEM — H01.005 BLEPHARITIS; RIGHT UPPER LID, RIGHT LOWER LID, LEFT UPPER LID, LEFT LOWER LID: Status: ACTIVE | Noted: 2023-08-19

## 2023-08-19 PROBLEM — H01.004 BLEPHARITIS; RIGHT UPPER LID, RIGHT LOWER LID, LEFT UPPER LID, LEFT LOWER LID: Status: ACTIVE | Noted: 2023-08-19

## 2023-08-19 PROBLEM — H02.831 DERMATOCHALASIS; RIGHT UPPER LID, LEFT UPPER LID: Status: ACTIVE | Noted: 2023-08-19

## 2023-08-19 PROBLEM — H01.001 BLEPHARITIS; RIGHT UPPER LID, RIGHT LOWER LID, LEFT UPPER LID, LEFT LOWER LID: Status: ACTIVE | Noted: 2023-08-19

## 2023-08-19 PROBLEM — H02.834 DERMATOCHALASIS; RIGHT UPPER LID, LEFT UPPER LID: Status: ACTIVE | Noted: 2023-08-19

## 2023-08-19 PROCEDURE — 99212 OFFICE O/P EST SF 10 MIN: CPT | Performed by: OPHTHALMOLOGY

## 2023-08-19 ASSESSMENT — SPHEQUIV_DERIVED
OS_SPHEQUIV: 1.125
OS_SPHEQUIV: 0.5
OD_SPHEQUIV: 0.25
OD_SPHEQUIV: 0.625

## 2023-08-19 ASSESSMENT — CONFRONTATIONAL VISUAL FIELD TEST (CVF)
OD_FINDINGS: FULL
OS_FINDINGS: FULL

## 2023-08-19 ASSESSMENT — REFRACTION_MANIFEST
OS_SPHERE: +1.50
OU_VA: 20/20-2
OS_AXIS: 170
OD_CYLINDER: SPH
OS_VA1: 20/20
OS_ADD: +2.50
OS_SPHERE: PLANO
OD_ADD: +2.50
OD_SPHERE: +0.50
OS_CYLINDER: SPH
OS_VA1: 20/20
OD_SPHERE: PLANO
OD_ADD: +2.50
OS_ADD: +2.50
OD_VA1: 20/20
OD_CYLINDER: -0.50
OS_CYLINDER: -0.75
OD_VA1: 20/20
OD_AXIS: 110

## 2023-08-19 ASSESSMENT — KERATOMETRY
OD_K2POWER_DIOPTERS: 44.00
METHOD_AUTO_MANUAL: AUTO
OD_K1POWER_DIOPTERS: 43.50
OS_K2POWER_DIOPTERS: 43.75
OD_AXISANGLE_DEGREES: 008
OS_K1POWER_DIOPTERS: 43.00
OS_AXISANGLE_DEGREES: 72

## 2023-08-19 ASSESSMENT — AXIALLENGTH_DERIVED
OS_AL: 23.2059
OD_AL: 23.2613
OD_AL: 23.4042
OS_AL: 23.4439

## 2023-08-19 ASSESSMENT — VISUAL ACUITY
OD_BCVA: 20/25-2
OS_BCVA: 20/25-2

## 2023-08-19 ASSESSMENT — REFRACTION_AUTOREFRACTION
OD_SPHERE: +1.00
OD_CYLINDER: -0.75
OD_AXIS: 93
OS_CYLINDER: -0.50
OS_SPHERE: +0.75
OS_AXIS: 130

## 2023-08-19 ASSESSMENT — REFRACTION_CURRENTRX
OS_VPRISM_DIRECTION: BF
OS_OVR_VA: 20/
OS_ADD: +2.50
OS_AXIS: 060
OD_CYLINDER: -0.50
OS_SPHERE: +1.00
OS_CYLINDER: -0.50
OD_OVR_VA: 20/
OD_SPHERE: +0.50
OD_AXIS: 090
OD_VPRISM_DIRECTION: BF
OD_ADD: +2.50

## 2023-08-19 ASSESSMENT — LID EXAM ASSESSMENTS
OS_BLEPHARITIS: LLL LUL
OD_BLEPHARITIS: RLL RUL

## 2023-08-19 ASSESSMENT — TONOMETRY
OS_IOP_MMHG: 12
OD_IOP_MMHG: 12

## 2023-08-19 ASSESSMENT — LID POSITION - DERMATOCHALASIS
OS_DERMATOCHALASIS: LUL 1+
OD_DERMATOCHALASIS: RUL 1+

## 2023-08-21 ENCOUNTER — OUTPATIENT (OUTPATIENT)
Dept: OUTPATIENT SERVICES | Facility: HOSPITAL | Age: 78
LOS: 1 days | End: 2023-08-21
Payer: COMMERCIAL

## 2023-08-21 DIAGNOSIS — M54.16 RADICULOPATHY, LUMBAR REGION: ICD-10-CM

## 2023-08-21 PROCEDURE — 72110 X-RAY EXAM L-2 SPINE 4/>VWS: CPT | Mod: 26

## 2023-08-23 RX ORDER — DIAZEPAM 5 MG/1
5 TABLET ORAL
Qty: 2 | Refills: 0 | Status: ACTIVE | COMMUNITY
Start: 2023-08-23 | End: 1900-01-01

## 2023-08-29 ENCOUNTER — OUTPATIENT (OUTPATIENT)
Dept: OUTPATIENT SERVICES | Facility: HOSPITAL | Age: 78
LOS: 1 days | End: 2023-08-29

## 2023-08-29 ENCOUNTER — APPOINTMENT (OUTPATIENT)
Dept: CARDIOLOGY | Facility: CLINIC | Age: 78
End: 2023-08-29
Payer: COMMERCIAL

## 2023-08-29 ENCOUNTER — APPOINTMENT (OUTPATIENT)
Dept: MRI IMAGING | Facility: CLINIC | Age: 78
End: 2023-08-29
Payer: MEDICARE

## 2023-08-29 ENCOUNTER — APPOINTMENT (OUTPATIENT)
Dept: PULMONOLOGY | Facility: CLINIC | Age: 78
End: 2023-08-29

## 2023-08-29 DIAGNOSIS — M54.16 RADICULOPATHY, LUMBAR REGION: ICD-10-CM

## 2023-08-29 PROCEDURE — 93306 TTE W/DOPPLER COMPLETE: CPT

## 2023-08-29 PROCEDURE — 93880 EXTRACRANIAL BILAT STUDY: CPT

## 2023-08-29 PROCEDURE — 72148 MRI LUMBAR SPINE W/O DYE: CPT | Mod: 26

## 2023-08-29 RX ADMIN — PERFLUTREN MG/ML: 6.52 INJECTION, SUSPENSION INTRAVENOUS at 00:00

## 2023-08-30 RX ORDER — PERFLUTREN 6.52 MG/ML
6.52 INJECTION, SUSPENSION INTRAVENOUS
Qty: 0 | Refills: 0 | Status: COMPLETED | OUTPATIENT
Start: 2023-08-29

## 2023-08-31 ENCOUNTER — APPOINTMENT (OUTPATIENT)
Dept: PAIN MANAGEMENT | Facility: CLINIC | Age: 78
End: 2023-08-31
Payer: COMMERCIAL

## 2023-08-31 VITALS — BODY MASS INDEX: 23.52 KG/M2 | HEIGHT: 71 IN | WEIGHT: 168 LBS

## 2023-08-31 PROCEDURE — 99214 OFFICE O/P EST MOD 30 MIN: CPT

## 2023-08-31 NOTE — PHYSICAL EXAM
[NL (90)] : forward flexion 90 degrees [Extension] : extension [Left] : left hip [5___] : adduction 5[unfilled]/5 [FreeTextEntry8] : left worse than right  [de-identified] : extension 20 degrees [] : no groin tenderness [de-identified] : external rotation 10 degrees [TWNoteComboBox6] : internal rotation 0 degrees

## 2023-08-31 NOTE — ASSESSMENT
[FreeTextEntry1] : After discussing various treatment options with the patient including but not limited to oral medications, physical therapy, exercise, modalities as well as interventional spinal injections, we have decided with the following plan:  1) Intervention Injection Therapy: I personally reviewed the MRI/CT scan images and agree with the radiologist's report. The radiological findings were discussed with the patient. The risks, benefits, contents and alternatives to injection were explained in full to the patient. Risks outlined include but are not limited to infection,sepsis, bleeding, post-dural puncture headache, nerve damage, temporary increase in pain, syncopal episode, failure to resolve symptoms, allergic reaction, symptom recurrence, and elevation of blood sugar in diabetics. Cortisone may cause immunosuppression. Patient understands the risks. All questions were answered. After discussion of options, patient requested an injection. Information regarding the injection was given to the patient. Which medications to stop prior to the injection was explained to the patient as well.  Follow up in 1-2 weeks post injection for re-evaluation.  Continue Home exercises, stretching, activity modification, physical therapy, and conservative care. left hip injection   2) He will f/u with neurosurgeon regarding L5/S1 fusion.

## 2023-08-31 NOTE — HISTORY OF PRESENT ILLNESS
[Lower back] : lower back [8] : 8 [6] : 6 [Dull/Aching] : dull/aching [Sharp] : sharp [] : yes [Constant] : constant [Household chores] : household chores [Leisure] : leisure [Social interactions] : social interactions [Rest] : rest [Sitting] : sitting [Standing] : standing [Walking] : walking [Bending forward] : bending forward [Stairs] : stairs [de-identified] : 8/31/23-   06/05/2023 : Patient presents for initial evaluation. 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 Has been seeing Dr. Dejesus and had numerous injections both into the back  and the SIJ. He did get good relief with the SIJ injections however the cortisone would make his sugars go up. (His insurance has changed) PT has not helped. acupuncture did not help.   Subjective Weakness: Yes Numbness/Tingling: Yes- hip and back Bladder/Bowel dysfunction: No Treatments Tried:  PT, injections  Attempted modalities for current pain complaint: See above: Medications: No  Injections: Yes SIJ injections and lumbar RFA's.   Previous Spine Surgery: Laminectomy and Discectomy L5/S1 - 3 years ago  Imaging: MRI Lumbar Spine (7/13/22) stand up: l3/4 bulge and FA, L4/5 broad bulge with FA, L5/s1 grade 1 lithesis with FA and NF stenosis.  Left hip 2021: medial hip arthrosis, fraying of the labrum with joint effusion   [FreeTextEntry1] : hips

## 2023-09-03 NOTE — HISTORY OF PRESENT ILLNESS
[FreeTextEntry1] : Last nuclear stress test was performed January 2019, which demonstrated on a pharmacologic protocol no symptoms of chest pain. No arrhythmias seen. EKG was negative for ST abnormalities. Normal blood pressure response and normal myocardial perfusion without any evidence of ischemia-i.e. negative study; LVEF equals 60%;
Right arm;

## 2023-09-05 ENCOUNTER — APPOINTMENT (OUTPATIENT)
Dept: ORTHOPEDIC SURGERY | Facility: CLINIC | Age: 78
End: 2023-09-05

## 2023-09-05 ENCOUNTER — APPOINTMENT (OUTPATIENT)
Dept: NEUROSURGERY | Facility: CLINIC | Age: 78
End: 2023-09-05
Payer: MEDICARE

## 2023-09-05 VITALS
HEIGHT: 71 IN | HEART RATE: 55 BPM | OXYGEN SATURATION: 98 % | SYSTOLIC BLOOD PRESSURE: 154 MMHG | BODY MASS INDEX: 23.52 KG/M2 | TEMPERATURE: 97.8 F | DIASTOLIC BLOOD PRESSURE: 69 MMHG | WEIGHT: 168 LBS

## 2023-09-05 DIAGNOSIS — F17.290 NICOTINE DEPENDENCE, OTHER TOBACCO PRODUCT, UNCOMPLICATED: ICD-10-CM

## 2023-09-05 PROCEDURE — 99214 OFFICE O/P EST MOD 30 MIN: CPT

## 2023-09-05 NOTE — REVIEW OF SYSTEMS
Pharmacy requeseting a generic be written for the following   Disp Refills Start End    NEXIUM 40 MG capsule 90 capsule 1 7/13/2017     Sig - Route: Take 1 capsule by mouth daily. Brand name medically necessary. - Oral          Pharmacy loaded  Pt last ov 5/9/17   [As Noted in HPI] : as noted in HPI [Negative] : Heme/Lymph

## 2023-09-06 ENCOUNTER — APPOINTMENT (OUTPATIENT)
Dept: CARDIOLOGY | Facility: CLINIC | Age: 78
End: 2023-09-06

## 2023-09-06 NOTE — HISTORY OF PRESENT ILLNESS
[> 3 months] : more  than 3 months [Worsening] : worsening [___ yrs] : [unfilled] year(s) ago [Bending] : worsened by bending [Lifting] : worsened by lifting [Recumbency] : relieved by recumbency [Rest] : relieved by rest [FreeTextEntry1] : LBP with left leg pain [de-identified] : JESUS GRAY is a 77 year old male who presents for follow up visit. of lower back pain with left leg radiculopathy. He states that 3 weeks ago he awoke with pain in his lower back with numbness and tingling that shoots down the side of his whole left leg into his whole foot. He states the back pain is currently worse than the leg pain and he would be unable to walk a few blocks because the pain in his back would get severe.  In 2019 he had a  L5 laminectomy and discectomy performed by Dr. Amaya at [facility]  followed by a revision 6 months later. He is having the same symptoms now from before his back surgeries. He denies any incontinence or weakness, but does endorse imbalance. States pain is a 10/10 and is constant with no alleviating factors. He has tried radiofrequency and trigger point injections 2 weeks ago with no relief. He has taken methocarbamol, Tylenol, and ibuprofen with no relief.  Interval visit. : Patient presents today for surgical discussion.  [Walking] : worsened by walking [Ataxia] : no ataxia [Incontinence] : no incontinence [Loss of Dexterity] : good dexterity [Urinary Ret.] : no urinary retention

## 2023-09-06 NOTE — ASSESSMENT
[FreeTextEntry1] : Patient with above history and no imaging. Lower back pain with left side radiculopathy, 10/10 with no alleviating factors. He states that the pain is the same pain that had him do 2 back surgeries prior. He has failed conservative pain management treatments and states the pain is progressively worsening.   Plan: Patient wishes to schedule L5- S1 posterior lumbar with interbody fusion. (PLIF) Medical Optimization  benefit: hopeful decompression, hopeful improvement in symptoms, hopeful prevention of progression of deficit  alternative: no surgical intervention; continued conservative therapy (PT, pain management) risks: bleeding, infection, CSF leak, failure of procedure, de-stabilization of the spine, re-herniation of disk fragment, need for re-operation, worsening pain, seizure, stroke, coma, death, DVT, PE, MI, PNA, UTI, difficulty/failure to intubate or extubate, new or worsening numbness, tingling, weakness, paralysis, sensory changes, difficulty/inability to ambulate, sexual dysfunction, incontinence    IKenyatta, personally performed the evaluation and management (E/M) services for this patient.  That E/M includes assessment of the initial examination, assessing the pertinent medical and surgical history, and establishing the plan of care.  At the time of the visit, my ACP, Andreia Chairez, actively participated in the evaluation and management services for this patient to be followed going forward in accordance with the plan documented in the clinical note.

## 2023-09-06 NOTE — PHYSICAL EXAM
[General Appearance - Alert] : alert [General Appearance - In No Acute Distress] : in no acute distress [General Appearance - Well Nourished] : well nourished [Oriented To Time, Place, And Person] : oriented to person, place, and time [Impaired Insight] : insight and judgment were intact [Affect] : the affect was normal [Motor Tone] : muscle tone was normal in all four extremities [No Tenderness to Palpation] : no spine tenderness on palpation [Able to toe walk] : the patient was able to toe walk [Sclera] : the sclera and conjunctiva were normal [Neck Appearance] : the appearance of the neck was normal [No Spinal Tenderness] : no spinal tenderness [Involuntary Movements] : no involuntary movements were seen [Antalgic] : antalgic [] : Motor: [UE/LE] : Motor: 5/5 motor strength in bilateral upper & lower extremities [FreeTextEntry6] : Bilateral LE weakness 4/5 [Able to heel walk] : the patient was not able to heel walk [de-identified] : CT abd/pelvis 8/2023  at   BONES/SOFT TISSUES: Multilevel degenerative changes are seen in the spine with disc disease endplate changes and facet arthropathy. These findings appear similar.

## 2023-09-06 NOTE — REASON FOR VISIT
[Follow-Up Visit] : a follow-up visit for [New Patient Visit] : a new patient visit [Back Pain] : back pain [Spinal Stenosis] : spinal stenosis [Spouse] : spouse [FreeTextEntry1] : LBP

## 2023-09-07 ENCOUNTER — OUTPATIENT (OUTPATIENT)
Dept: OUTPATIENT SERVICES | Facility: HOSPITAL | Age: 78
LOS: 1 days | End: 2023-09-07
Payer: COMMERCIAL

## 2023-09-07 ENCOUNTER — APPOINTMENT (OUTPATIENT)
Dept: CARDIOLOGY | Facility: CLINIC | Age: 78
End: 2023-09-07
Payer: MEDICARE

## 2023-09-07 ENCOUNTER — NON-APPOINTMENT (OUTPATIENT)
Age: 78
End: 2023-09-07

## 2023-09-07 VITALS
HEART RATE: 59 BPM | HEIGHT: 71 IN | SYSTOLIC BLOOD PRESSURE: 132 MMHG | DIASTOLIC BLOOD PRESSURE: 70 MMHG | RESPIRATION RATE: 16 BRPM | WEIGHT: 166 LBS | BODY MASS INDEX: 23.24 KG/M2

## 2023-09-07 VITALS
DIASTOLIC BLOOD PRESSURE: 54 MMHG | WEIGHT: 163.14 LBS | HEIGHT: 71 IN | TEMPERATURE: 98 F | OXYGEN SATURATION: 98 % | HEART RATE: 60 BPM | RESPIRATION RATE: 14 BRPM | SYSTOLIC BLOOD PRESSURE: 90 MMHG

## 2023-09-07 DIAGNOSIS — Z01.810 ENCOUNTER FOR PREPROCEDURAL CARDIOVASCULAR EXAMINATION: ICD-10-CM

## 2023-09-07 DIAGNOSIS — I25.10 ATHEROSCLEROTIC HEART DISEASE OF NATIVE CORONARY ARTERY WITHOUT ANGINA PECTORIS: ICD-10-CM

## 2023-09-07 DIAGNOSIS — E11.9 TYPE 2 DIABETES MELLITUS WITHOUT COMPLICATIONS: ICD-10-CM

## 2023-09-07 DIAGNOSIS — M43.16 SPONDYLOLISTHESIS, LUMBAR REGION: ICD-10-CM

## 2023-09-07 DIAGNOSIS — Z98.890 OTHER SPECIFIED POSTPROCEDURAL STATES: Chronic | ICD-10-CM

## 2023-09-07 DIAGNOSIS — I65.29 OCCLUSION AND STENOSIS OF UNSPECIFIED CAROTID ARTERY: ICD-10-CM

## 2023-09-07 DIAGNOSIS — Z98.49 CATARACT EXTRACTION STATUS, UNSPECIFIED EYE: Chronic | ICD-10-CM

## 2023-09-07 DIAGNOSIS — I10 ESSENTIAL (PRIMARY) HYPERTENSION: ICD-10-CM

## 2023-09-07 DIAGNOSIS — Z29.9 ENCOUNTER FOR PROPHYLACTIC MEASURES, UNSPECIFIED: ICD-10-CM

## 2023-09-07 DIAGNOSIS — Z13.89 ENCOUNTER FOR SCREENING FOR OTHER DISORDER: ICD-10-CM

## 2023-09-07 DIAGNOSIS — Z01.818 ENCOUNTER FOR OTHER PREPROCEDURAL EXAMINATION: ICD-10-CM

## 2023-09-07 LAB
A1C WITH ESTIMATED AVERAGE GLUCOSE RESULT: 6.1 % — HIGH (ref 4–5.6)
ANION GAP SERPL CALC-SCNC: 14 MMOL/L — SIGNIFICANT CHANGE UP (ref 5–17)
APTT BLD: 28.4 SEC — SIGNIFICANT CHANGE UP (ref 24.5–35.6)
BASOPHILS # BLD AUTO: 0.04 K/UL — SIGNIFICANT CHANGE UP (ref 0–0.2)
BASOPHILS NFR BLD AUTO: 0.5 % — SIGNIFICANT CHANGE UP (ref 0–2)
BLD GP AB SCN SERPL QL: SIGNIFICANT CHANGE UP
BUN SERPL-MCNC: 18.6 MG/DL — SIGNIFICANT CHANGE UP (ref 8–20)
CALCIUM SERPL-MCNC: 9.5 MG/DL — SIGNIFICANT CHANGE UP (ref 8.4–10.5)
CHLORIDE SERPL-SCNC: 96 MMOL/L — SIGNIFICANT CHANGE UP (ref 96–108)
CO2 SERPL-SCNC: 25 MMOL/L — SIGNIFICANT CHANGE UP (ref 22–29)
CREAT SERPL-MCNC: 0.91 MG/DL — SIGNIFICANT CHANGE UP (ref 0.5–1.3)
EGFR: 88 ML/MIN/1.73M2 — SIGNIFICANT CHANGE UP
EOSINOPHIL # BLD AUTO: 0.07 K/UL — SIGNIFICANT CHANGE UP (ref 0–0.5)
EOSINOPHIL NFR BLD AUTO: 0.9 % — SIGNIFICANT CHANGE UP (ref 0–6)
ESTIMATED AVERAGE GLUCOSE: 128 MG/DL — HIGH (ref 68–114)
GLUCOSE SERPL-MCNC: 162 MG/DL — HIGH (ref 70–99)
HCT VFR BLD CALC: 40.8 % — SIGNIFICANT CHANGE UP (ref 39–50)
HGB BLD-MCNC: 14.2 G/DL — SIGNIFICANT CHANGE UP (ref 13–17)
IMM GRANULOCYTES NFR BLD AUTO: 0.4 % — SIGNIFICANT CHANGE UP (ref 0–0.9)
INR BLD: 0.95 RATIO — SIGNIFICANT CHANGE UP (ref 0.85–1.18)
LYMPHOCYTES # BLD AUTO: 1.98 K/UL — SIGNIFICANT CHANGE UP (ref 1–3.3)
LYMPHOCYTES # BLD AUTO: 24.3 % — SIGNIFICANT CHANGE UP (ref 13–44)
MCHC RBC-ENTMCNC: 30.2 PG — SIGNIFICANT CHANGE UP (ref 27–34)
MCHC RBC-ENTMCNC: 34.8 GM/DL — SIGNIFICANT CHANGE UP (ref 32–36)
MCV RBC AUTO: 86.8 FL — SIGNIFICANT CHANGE UP (ref 80–100)
MONOCYTES # BLD AUTO: 0.59 K/UL — SIGNIFICANT CHANGE UP (ref 0–0.9)
MONOCYTES NFR BLD AUTO: 7.2 % — SIGNIFICANT CHANGE UP (ref 2–14)
MRSA PCR RESULT.: SIGNIFICANT CHANGE UP
NEUTROPHILS # BLD AUTO: 5.43 K/UL — SIGNIFICANT CHANGE UP (ref 1.8–7.4)
NEUTROPHILS NFR BLD AUTO: 66.7 % — SIGNIFICANT CHANGE UP (ref 43–77)
PLATELET # BLD AUTO: 262 K/UL — SIGNIFICANT CHANGE UP (ref 150–400)
POTASSIUM SERPL-MCNC: 4 MMOL/L — SIGNIFICANT CHANGE UP (ref 3.5–5.3)
POTASSIUM SERPL-SCNC: 4 MMOL/L — SIGNIFICANT CHANGE UP (ref 3.5–5.3)
PROTHROM AB SERPL-ACNC: 10.6 SEC — SIGNIFICANT CHANGE UP (ref 9.5–13)
RBC # BLD: 4.7 M/UL — SIGNIFICANT CHANGE UP (ref 4.2–5.8)
RBC # FLD: 12.1 % — SIGNIFICANT CHANGE UP (ref 10.3–14.5)
S AUREUS DNA NOSE QL NAA+PROBE: SIGNIFICANT CHANGE UP
SODIUM SERPL-SCNC: 135 MMOL/L — SIGNIFICANT CHANGE UP (ref 135–145)
WBC # BLD: 8.14 K/UL — SIGNIFICANT CHANGE UP (ref 3.8–10.5)
WBC # FLD AUTO: 8.14 K/UL — SIGNIFICANT CHANGE UP (ref 3.8–10.5)

## 2023-09-07 PROCEDURE — 71046 X-RAY EXAM CHEST 2 VIEWS: CPT | Mod: 26

## 2023-09-07 PROCEDURE — 71046 X-RAY EXAM CHEST 2 VIEWS: CPT

## 2023-09-07 PROCEDURE — 99214 OFFICE O/P EST MOD 30 MIN: CPT

## 2023-09-07 PROCEDURE — G0463: CPT

## 2023-09-07 PROCEDURE — 93000 ELECTROCARDIOGRAM COMPLETE: CPT | Mod: NC

## 2023-09-07 RX ORDER — SODIUM CHLORIDE 9 MG/ML
3 INJECTION INTRAMUSCULAR; INTRAVENOUS; SUBCUTANEOUS EVERY 8 HOURS
Refills: 0 | Status: DISCONTINUED | OUTPATIENT
Start: 2023-09-11 | End: 2023-09-11

## 2023-09-07 RX ORDER — CEFAZOLIN SODIUM 1 G
2000 VIAL (EA) INJECTION ONCE
Refills: 0 | Status: DISCONTINUED | OUTPATIENT
Start: 2023-09-11 | End: 2023-09-11

## 2023-09-07 NOTE — HISTORY OF PRESENT ILLNESS
[FreeTextEntry1] : He has been complaining of some symptoms of numbness and tingling type discomfort going down both legs at rest and sometimes with activity. He does have a long-standing history of chronic spinal disease and low back syndrome and previous surgeries and had been followed with an orthopedic surgeon in the past; now recently diagnosed as "neuropathy", also possibly diabetic neuropathy related; it does not appear to be claudication-like symptoms;  He also has non-insulin-dependent diabetes however states his A1c has been well controlled;  Tolerating current cardiac medical regimen without difficulty;   Transthoracic Echocardiogram (8/29/2023):  There is now moderate aortic valve stenosis with AoV area (1.27 cm^2), with AoV peak and mean pressure gradients of (23.4 mmHg) and (11.0 mmHg) respectively and AoV max velocity (2.40 m/s).  There is normal LV size and wall thickness, with normal systolic and diastolic function; LVEF of 65 to 70%.  The left and right atria normal in size and structure.  Trace MR. Mildly elevated pulmonary artery systolic pressure.  There is trivial pericardial effusion;  Carotid Doppler (8/29/2023):  The left demonstrates moderate calcified and heterogenous plaquing. There is 50-69% stenosis of the left proximal ICA. The right demonstrates mild to moderate heterogenous plaquing.  There is 20-49% stenosis of right proximal ICA.  There is antegrade flow of the right and left vertebral arteries.  When compared to prior study dated 2020, there has been no significant interval change;   Pharmacologic Nuclear Stress test (11/14/2022):  Patient developed SOB, nausea and dizziness during stress exam which resolved with rest.  Patient developed occasional PVC, rare PAC during exercise.  The EKG was negative for ischemia.  Normal Sestamibi myocardial perfusion imaging with artifact. LV function was hyperdynamic with LVFEF of 71%.  There was no interval change when compared to prior study dated 2019;

## 2023-09-07 NOTE — H&P PST ADULT - PROBLEM SELECTOR PLAN 1
65M with PMH DM II, HTN, HLD, cardiac murmur, carotid stenosis, CAD now with lumbar spondylolisthesis scheduled for L5-S1 decompression and interbody fusion on 9/11/2023.     -Medical evaluation pending  -Cardiac evaluation pending  - Patient educated on ERP protocol (written/verbal)- verbalized understanding  -Educated on NSAIDS, multivitamins and herbals that increase the risk of bleeding and need to be stopped 5 days before procedure  -Educated on infection prevention  -Tylenol can be taken 5 days before surgery if needed for pain  -ASA per cardiologist   -Stop metformin the morning of surgery  -Will continue all other medications as prescribed  -Verbalized understanding of all instructions. 65M with PMH DM II, HTN, HLD, cardiac murmur, carotid stenosis, CAD now with lumbar spondylolisthesis scheduled for L5-S1 decompression and interbody fusion on 9/11/2023.     -Medical evaluation pending  -Cardiac evaluation pending  - NPO after midnight the night before surgery   -Educated on NSAIDS, multivitamins and herbals that increase the risk of bleeding and need to be stopped 5 days before procedure  -Educated on infection prevention  -Tylenol can be taken 5 days before surgery if needed for pain  -ASA per cardiologist   -Stop metformin the morning of surgery  -Will continue all other medications as prescribed  -Verbalized understanding of all instructions.

## 2023-09-07 NOTE — H&P PST ADULT - EKG AND INTERPRETATION
Completed at cardiologist office on 9/7/2023 Completed at cardiologist office on 9/7/2023  Sinus bradycardia

## 2023-09-07 NOTE — H&P PST ADULT - HISTORY OF PRESENT ILLNESS
65M presenting with nasal polyps. He had sinus surgery about 15 years ago, but now he is completely congested and blocked with polyps. He has tried nasal sprays but have not helped. CT max showing polyps and fungus in sphenoid with evidence of bilateral maxillary antrostomy from prior surgery. 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    Current Meds  Symbicort 160-4.5 MCG/ACT Inhalation Aerosol            Nasal Endoscopy: Nasal Endoscopy: Procedure: Bilateral nasal endoscopy (CPT 33810)  Indication: Anterior rhinoscopy was inadequate to evaluate pathology.  Left/Right: polyps extending to the nasal floor bialterally, unable to visualize MM or SER on either side due to polyp burden.    Current Meds  Atenolol 25 MG Oral Tablet; TAKE 1 TABLET BY MOUTH EVERY DAY  Baby Aspirin 81 MG CHEW  Diclofenac Sodium 2 % External Solution; APPLY 1 INCH APPLICATION 3 TIMES DAILY  Losartan Potassium-HCTZ 100-12.5 MG Oral Tablet; Take 1 tablet daily  metFORMIN HCl - 500 MG Oral Tablet  Rosuvastatin Calcium 10 MG Oral Tablet; TAKE 1 TABLET BY MOUTH EVERY DAY    65M with PMH DM II, HTN, HLD, carotid stenosis, CAD presents to PST today. Pt. reports pain to lower back his entire life and completed 2 lower back surgeries 2 years ago. Described pain as sharp with  radiation to LLE. Reports pain for more than 5 years. Completed cortisone injections previously with the last one 2022 with short term relief. Reports cortisone injections have raised his sugar.  Pt. reports pain is constant. Standing have been exacerbating pain. Sitting alleviate pain. Pain levels include a current pain level of 8/10, a minimum pain level of 5/10 and a maximum pain level of 9/10. Reports pain is affecting his ADL's. Difficulty with outting on pants.         Current Meds  Atenolol 25 MG Oral Tablet; TAKE 1 TABLET BY MOUTH EVERY DAY  Baby Aspirin 81 MG CHEW  Diclofenac Sodium 2 % External Solution; APPLY 1 INCH APPLICATION 3 TIMES DAILY  Losartan Potassium-HCTZ 100-12.5 MG Oral Tablet; Take 1 tablet daily  metFORMIN HCl - 500 MG Oral Tablet  Rosuvastatin Calcium 10 MG Oral Tablet; TAKE 1 TABLET BY MOUTH EVERY DAY    65M with PMH DM II, HTN, HLD, cardiac murmur, carotid stenosis, CAD presents to PST today. Pt. reports pain to lower back his entire life and completed 2 lower back surgeries 2 years ago. Described pain as sharp with  radiation to LLE. Reports pain for more than 5 years. Completed cortisone injections previously with the last one 2022 with short term relief. Reports cortisone injections have greatly elevated his blood glucose level.  Pt. reports pain is constant. Standing have been exacerbating pain. Sitting alleviate pain. Pain levels include a current pain level of 8/10, a minimum pain level of 5/10 and a maximum pain level of 9/10. Reports pain is affecting his ADL's. Difficulty with putting on his pants. Scheduled for L5-S1 decompression and interbody fusion on 9/11/2023.

## 2023-09-07 NOTE — H&P PST ADULT - MUSCULOSKELETAL
details… no joint swelling/no joint erythema/no joint warmth/strength 5/5 bilateral upper extremities/strength 5/5 bilateral lower extremities/back exam

## 2023-09-07 NOTE — H&P PST ADULT - NSICDXFAMILYHX_GEN_ALL_CORE_FT
FAMILY HISTORY:  Father  Still living? Unknown  FH: diabetes mellitus, Age at diagnosis: Age Unknown  FHx: heart disease, Age at diagnosis: Age Unknown    Mother  Still living? No  FH: diabetes mellitus, Age at diagnosis: Age Unknown  FHx: heart disease, Age at diagnosis: Age Unknown    Sibling  Still living? Unknown  FH: diabetes mellitus, Age at diagnosis: Age Unknown

## 2023-09-07 NOTE — H&P PST ADULT - NSICDXPASTMEDICALHX_GEN_ALL_CORE_FT
PAST MEDICAL HISTORY:  CAD (coronary artery disease)     H/O carotid stenosis     HLD (hyperlipidemia)     HTN (hypertension)      PAST MEDICAL HISTORY:  CAD (coronary artery disease)     H/O cardiac murmur     H/O carotid stenosis     HLD (hyperlipidemia)     HTN (hypertension)     Type 2 diabetes mellitus

## 2023-09-07 NOTE — ASSESSMENT
[FreeTextEntry1] : EKG shows sinus bradycardia at a rate of 59 bpm; . Otherwise within normal limits;   In summary, this 77-year-old gentleman has a history for mild hypertension which has been well controlled, hyperlipidemia, heart murmur of mild to moderate aortic stenosis,moderate stenosis of the left internal carotid artery and chronic low back pain syndrome with possible affected neuropathy to the lower extremities at this time; Patient has been experiencing exertional dyspnea over the last few months;   , but he reports this is unchanged and has not really affected him much; His most recent echocardiogram demonstrates stable aortic valve stenosis, now with AoV area of (1.27 cm), was However, AoV peak and mean pressure gradients are only (23.4 mmHg) and (11.0 mmHg) respectively and AoV max velocity is only moderately increased at (2.40 m/s).   Fortunately, his nuclear stress test was mostly unremarkable with significant signs for coronary artery disease and his carotid doppler was stable with moderate disease unchanged from prior study.  Patient is now in need of cardiac clearance regarding lower spine surgery scheduled for September 11th 2with Dr. Kenyatta Pollard at [facility].   Plan:  Have explained to patient that although his aortic stenosis is moderate to severe, clinically it still and that range and not quite severe or critical to cause much of his symptoms at this point.;  Additionally although his carotid stenosis is moderate on the left side, it has been unchanged over the past 2 to 3 years and we will continue to monitor this as well as keeping his blood pressure and cholesterol under good control;  Patient encouraged to participate in a modest physical activity and exercise regimen to improve his functional capacity and reassess his symptomatology and fatigue;  Patient to notify us if there is any significant change in his symptoms going forward between now and next visit;  Based upon Mr. Oracio Whitehead's otherwise stable cardiac pattern, there is no absolute cardiac contraindication for him to undergo the proposed orthopedic surgery.    He may hold the aspirin five to seven days prior to surgery and restart thereafter if you deem it safe.   Follow up with Dr. Pond in 5-6 months or PRN.  If I may be of addtional assistance, please do not hesitate to call.

## 2023-09-07 NOTE — H&P PST ADULT - ATTENDING COMMENTS
Neurosurgery Attending Attestation:    Patient seen and examined at bedside. Agree with plan and note as documented above.    Discussed the risks and benefits of surgery with patient. We discussed that the benefits of surgery include decompression of the spinal cord and/or nerve roots and stabilization of the spine. We discussed that the risks of surgery are not limited to but include bleeding, pain, infection, motor/sensory deficits, spinal stroke, paralysis, death, and need for repeat surgery. Patient verbalized understanding of the risks and benefits and wishes to proceed with surgery. All questions answered.    I reviewed the patient’s imaging before the case. I confirmed with the patient the planned surgical site and side of the procedure. I confirmed that the appropriate imaging demonstrating the surgical side and site was available and present for the planned case. The present imaging confirmed the surgical indication for the planned surgery. The secondary provider reviewing the imaging was Hien Warner MD      Plan:  -proceed to OR for L5-S1 decompression and interbody fusion as planned    -Kenyatta Pollard MD

## 2023-09-07 NOTE — H&P PST ADULT - PROBLEM SELECTOR PLAN 4
Caprini -5  moderate risk   Surgical team please assess/recommend mechanical/pharmacological measures for VTE prophylaxis

## 2023-09-07 NOTE — H&P PST ADULT - ASSESSMENT
65M with PMH DM II, HTN, HLD, cardiac murmur, carotid stenosis, CAD now with lumbar spondylolisthesis scheduled for L5-S1 decompression and interbody fusion on 2023.     -Medical evaluation pending  -Cardiac evaluation pending  - Patient educated on ERP protocol (written/verbal)- verbalized understanding  -Educated on NSAIDS, multivitamins and herbals that increase the risk of bleeding and need to be stopped 5 days before procedure  -Educated on infection prevention  -Tylenol can be taken 5 days before surgery if needed for pain  -ASA per cardiologist   -Stop metformin the morning of surgery  -Will continue all other medications as prescribed  -Verbalized understanding of all instructions.    OPIOID RISK TOOL    JENNA EACH BOX THAT APPLIES AND ADD TOTALS AT THE END    FAMILY HISTORY OF SUBSTANCE ABUSE                 FEMALE         MALE                                                Alcohol                             [  ]1 pt          [  ]3pts                                               Illegal Durgs                     [  ]2 pts        [  ]3pts                                               Rx Drugs                           [  ]4 pts        [  ]4 pts    PERSONAL HISTORY OF SUBSTANCE ABUSE                                                                                          Alcohol                             [  ]3 pts       [  ]3 pts                                               Illegal Drugs                     [  ]4 pts        [  ]4 pts                                               Rx Drugs                           [  ]5 pts        [  ]5 pts    AGE BETWEEN 16-45 YEARS                                      [  ]1 pt         [  ]1 pt    HISTORY OF PREADOLESCENT   SEXUAL ABUSE                                                             [  ]3 pts        [  ]0pts    PSYCHOLOGICAL DISEASE                     ADD, OCD, Bipolar, Schizophrenia        [  ]2 pts         [  ]2 pts                      Depression                                               [  ]1 pt           [  ]1 pt           SCORING TOTAL   (add numbers and type here)              (0)                                     A score of 3 or lower indicated LOW risk for future opioid abuse  A score of 4 to 7 indicated moderate risk for future opioid abuse  A score of 8 or higher indicates a high risk for opioid abuse      CAPRINI SCORE [CLOT]    AGE RELATED RISK FACTORS                                                       MOBILITY RELATED FACTORS  [ ] Age 41-60 years                                            (1 Point)                  [ ] Bed rest                                                        (1 Point)  [ ] Age: 61-74 years                                           (2 Points)                 [ ] Plaster cast                                                   (2 Points)  [x ] Age= 75 years                                              (3 Points)                 [ ] Bed bound for more than 72 hours                 (2 Points)    DISEASE RELATED RISK FACTORS                                               GENDER SPECIFIC FACTORS  [ ] Edema in the lower extremities                       (1 Point)                  [ ] Pregnancy                                                     (1 Point)  [ ] Varicose veins                                               (1 Point)                  [ ] Post-partum < 6 weeks                                   (1 Point)             [ ] BMI > 25 Kg/m2                                            (1 Point)                  [ ] Hormonal therapy  or oral contraception          (1 Point)                 [ ] Sepsis (in the previous month)                        (1 Point)                  [ ] History of pregnancy complications                 (1 point)  [ ] Pneumonia or serious lung disease                                               [ ] Unexplained or recurrent                     (1 Point)           (in the previous month)                               (1 Point)  [ ] Abnormal pulmonary function test                     (1 Point)                 SURGERY RELATED RISK FACTORS  [ ] Acute myocardial infarction                              (1 Point)                 [ ]  Section                                             (1 Point)  [ ] Congestive heart failure (in the previous month)  (1 Point)               [ ] Minor surgery                                                  (1 Point)   [ ] Inflammatory bowel disease                             (1 Point)                 [ ] Arthroscopic surgery                                        (2 Points)  [ ] Central venous access                                      (2 Points)                [x ] General surgery lasting more than 45 minutes   (2 Points)       [ ] Stroke (in the previous month)                          (5 Points)               [ ] Elective arthroplasty                                         (5 Points)                                                                                                                                               HEMATOLOGY RELATED FACTORS                                                 TRAUMA RELATED RISK FACTORS  [ ] Prior episodes of VTE                                     (3 Points)                [ ] Fracture of the hip, pelvis, or leg                       (5 Points)  [ ] Positive family history for VTE                         (3 Points)                 [ ] Acute spinal cord injury (in the previous month)  (5 Points)  [ ] Prothrombin 31776 A                                     (3 Points)                 [ ] Paralysis  (less than 1 month)                             (5 Points)  [ ] Factor V Leiden                                             (3 Points)                  [ ] Multiple Trauma within 1 month                        (5 Points)  [ ] Lupus anticoagulants                                     (3 Points)                                                           [ ] Anticardiolipin antibodies                               (3 Points)                                                       [ ] High homocysteine in the blood                      (3 Points)                                             [ ] Other congenital or acquired thrombophilia      (3 Points)                                                [ ] Heparin induced thrombocytopenia                  (3 Points)                                          Total Score [    5      ]    Caprini Score 0 - 2:  Low Risk, No VTE Prophylaxis required for most patients, encourage ambulation  Caprini Score 3 - 6:  At Risk, pharmacologic VTE prophylaxis is indicated for most patients (in the absence of a contraindication)  Caprini Score Greater than or = 7:  High Risk, pharmacologic VTE prophylaxis is indicated for most patients (in the absence of a contraindication)      65M with PMH DM II, HTN, HLD, cardiac murmur, carotid stenosis, CAD now with lumbar spondylolisthesis scheduled for L5-S1 decompression and interbody fusion on 2023.     -Medical evaluation pending  -Cardiac evaluation pending  - NPO after midnight the night before surgery except for meds  -Educated on NSAIDS, multivitamins and herbals that increase the risk of bleeding and need to be stopped 5 days before procedure  -Educated on infection prevention  -Tylenol can be taken 5 days before surgery if needed for pain  -ASA per cardiologist   -Stop metformin the morning of surgery  -Will continue all other medications as prescribed  -Verbalized understanding of all instructions.    OPIOID RISK TOOL    JENNA EACH BOX THAT APPLIES AND ADD TOTALS AT THE END    FAMILY HISTORY OF SUBSTANCE ABUSE                 FEMALE         MALE                                                Alcohol                             [  ]1 pt          [  ]3pts                                               Illegal Durgs                     [  ]2 pts        [  ]3pts                                               Rx Drugs                           [  ]4 pts        [  ]4 pts    PERSONAL HISTORY OF SUBSTANCE ABUSE                                                                                          Alcohol                             [  ]3 pts       [  ]3 pts                                               Illegal Drugs                     [  ]4 pts        [  ]4 pts                                               Rx Drugs                           [  ]5 pts        [  ]5 pts    AGE BETWEEN 16-45 YEARS                                      [  ]1 pt         [  ]1 pt    HISTORY OF PREADOLESCENT   SEXUAL ABUSE                                                             [  ]3 pts        [  ]0pts    PSYCHOLOGICAL DISEASE                     ADD, OCD, Bipolar, Schizophrenia        [  ]2 pts         [  ]2 pts                      Depression                                               [  ]1 pt           [  ]1 pt           SCORING TOTAL   (add numbers and type here)              (0)                                     A score of 3 or lower indicated LOW risk for future opioid abuse  A score of 4 to 7 indicated moderate risk for future opioid abuse  A score of 8 or higher indicates a high risk for opioid abuse      CAPRINI SCORE [CLOT]    AGE RELATED RISK FACTORS                                                       MOBILITY RELATED FACTORS  [ ] Age 41-60 years                                            (1 Point)                  [ ] Bed rest                                                        (1 Point)  [ ] Age: 61-74 years                                           (2 Points)                 [ ] Plaster cast                                                   (2 Points)  [x ] Age= 75 years                                              (3 Points)                 [ ] Bed bound for more than 72 hours                 (2 Points)    DISEASE RELATED RISK FACTORS                                               GENDER SPECIFIC FACTORS  [ ] Edema in the lower extremities                       (1 Point)                  [ ] Pregnancy                                                     (1 Point)  [ ] Varicose veins                                               (1 Point)                  [ ] Post-partum < 6 weeks                                   (1 Point)             [ ] BMI > 25 Kg/m2                                            (1 Point)                  [ ] Hormonal therapy  or oral contraception          (1 Point)                 [ ] Sepsis (in the previous month)                        (1 Point)                  [ ] History of pregnancy complications                 (1 point)  [ ] Pneumonia or serious lung disease                                               [ ] Unexplained or recurrent                     (1 Point)           (in the previous month)                               (1 Point)  [ ] Abnormal pulmonary function test                     (1 Point)                 SURGERY RELATED RISK FACTORS  [ ] Acute myocardial infarction                              (1 Point)                 [ ]  Section                                             (1 Point)  [ ] Congestive heart failure (in the previous month)  (1 Point)               [ ] Minor surgery                                                  (1 Point)   [ ] Inflammatory bowel disease                             (1 Point)                 [ ] Arthroscopic surgery                                        (2 Points)  [ ] Central venous access                                      (2 Points)                [x ] General surgery lasting more than 45 minutes   (2 Points)       [ ] Stroke (in the previous month)                          (5 Points)               [ ] Elective arthroplasty                                         (5 Points)                                                                                                                                               HEMATOLOGY RELATED FACTORS                                                 TRAUMA RELATED RISK FACTORS  [ ] Prior episodes of VTE                                     (3 Points)                [ ] Fracture of the hip, pelvis, or leg                       (5 Points)  [ ] Positive family history for VTE                         (3 Points)                 [ ] Acute spinal cord injury (in the previous month)  (5 Points)  [ ] Prothrombin 50015 A                                     (3 Points)                 [ ] Paralysis  (less than 1 month)                             (5 Points)  [ ] Factor V Leiden                                             (3 Points)                  [ ] Multiple Trauma within 1 month                        (5 Points)  [ ] Lupus anticoagulants                                     (3 Points)                                                           [ ] Anticardiolipin antibodies                               (3 Points)                                                       [ ] High homocysteine in the blood                      (3 Points)                                             [ ] Other congenital or acquired thrombophilia      (3 Points)                                                [ ] Heparin induced thrombocytopenia                  (3 Points)                                          Total Score [    5      ]    Caprini Score 0 - 2:  Low Risk, No VTE Prophylaxis required for most patients, encourage ambulation  Caprini Score 3 - 6:  At Risk, pharmacologic VTE prophylaxis is indicated for most patients (in the absence of a contraindication)  Caprini Score Greater than or = 7:  High Risk, pharmacologic VTE prophylaxis is indicated for most patients (in the absence of a contraindication)

## 2023-09-07 NOTE — REASON FOR VISIT
[FreeTextEntry1] : JESUS GRAY is being seen for arrhythmia/ECG abnormalities, structural heart and valve disease, hyperlipidemia, hypertension and carotid, aortic and peripheral vascular disease.   The patient is a 77-year-old white male who has a history for heart murmur of moderate aortic stenosis, mild hypertension and hyperlipidemia with mild-to moderate carotid plaquing stenosis--who presents back to the office today for general cardiac checkup;   Mr. Gray is now in need of cardiac clearance regarding lower spine surgery scheduled for September 11th 2with Dr. Kenyatta Pollard at [facility].   For the most part, patient has been feeling overall well and denies any significant chest pain,SOB, STEVENS, orthopnea, PND, palpitations or syncope;

## 2023-09-07 NOTE — PHYSICAL EXAM
[Well Developed] : well developed [Well Nourished] : well nourished [No Acute Distress] : no acute distress [Normal Conjunctiva] : normal conjunctiva [Normal Venous Pressure] : normal venous pressure [No Carotid Bruit] : no carotid bruit [Normal S1, S2] : normal S1, S2 [No Rub] : no rub [No Gallop] : no gallop [Murmur] : murmur [Clear Lung Fields] : clear lung fields [Good Air Entry] : good air entry [No Respiratory Distress] : no respiratory distress  [Soft] : abdomen soft [Non Tender] : non-tender [No Masses/organomegaly] : no masses/organomegaly [Normal Gait] : normal gait [No Cyanosis] : no cyanosis [No Clubbing] : no clubbing [No Rash] : no rash [No Skin Lesions] : no skin lesions [Moves all extremities] : moves all extremities [No Focal Deficits] : no focal deficits [Normal Speech] : normal speech [Alert and Oriented] : alert and oriented [Normal memory] : normal memory [de-identified] : Grade II/VI to III/VI harsh systolic murmur with preserved A2.  But slightly diminished; [de-identified] : Trace to 1+ pretibial/ankle edema with right greater than left

## 2023-09-07 NOTE — REVIEW OF SYSTEMS
[Dyspnea on exertion] : dyspnea during exertion [Numbness (Hypoesthesia)] : numbness [Tingling (Paresthesia)] : tingling [Negative] : Heme/Lymph

## 2023-09-08 ENCOUNTER — APPOINTMENT (OUTPATIENT)
Dept: NEUROSURGERY | Facility: CLINIC | Age: 78
End: 2023-09-08

## 2023-09-08 PROBLEM — Z86.79 PERSONAL HISTORY OF OTHER DISEASES OF THE CIRCULATORY SYSTEM: Chronic | Status: ACTIVE | Noted: 2023-09-07

## 2023-09-08 PROBLEM — I10 ESSENTIAL (PRIMARY) HYPERTENSION: Chronic | Status: ACTIVE | Noted: 2023-09-07

## 2023-09-08 PROBLEM — I25.10 ATHEROSCLEROTIC HEART DISEASE OF NATIVE CORONARY ARTERY WITHOUT ANGINA PECTORIS: Chronic | Status: ACTIVE | Noted: 2023-09-07

## 2023-09-08 PROBLEM — E11.9 TYPE 2 DIABETES MELLITUS WITHOUT COMPLICATIONS: Chronic | Status: ACTIVE | Noted: 2023-09-07

## 2023-09-08 PROBLEM — E78.5 HYPERLIPIDEMIA, UNSPECIFIED: Chronic | Status: ACTIVE | Noted: 2023-09-07

## 2023-09-08 NOTE — PATIENT PROFILE ADULT - NSPRONUTRITIONRISK_GEN_A_NUR
Have You Had Dysport Before?: has had dysport When Was Your Last Dysport Treatment?: 08/03/2021 No indicators present

## 2023-09-08 NOTE — PATIENT PROFILE ADULT - FALL HARM RISK - HARM RISK INTERVENTIONS

## 2023-09-08 NOTE — PATIENT PROFILE ADULT - FUNCTIONAL ASSESSMENT - BASIC MOBILITY 6.
3-calculated by average/Not able to assess (calculate score using [facility] averaging method)

## 2023-09-10 ENCOUNTER — TRANSCRIPTION ENCOUNTER (OUTPATIENT)
Age: 78
End: 2023-09-10

## 2023-09-11 ENCOUNTER — INPATIENT (INPATIENT)
Facility: HOSPITAL | Age: 78
LOS: 2 days | Discharge: HOME | DRG: 454 | End: 2023-09-14
Attending: STUDENT IN AN ORGANIZED HEALTH CARE EDUCATION/TRAINING PROGRAM | Admitting: STUDENT IN AN ORGANIZED HEALTH CARE EDUCATION/TRAINING PROGRAM
Payer: COMMERCIAL

## 2023-09-11 ENCOUNTER — APPOINTMENT (OUTPATIENT)
Dept: NEUROSURGERY | Facility: HOSPITAL | Age: 78
End: 2023-09-11

## 2023-09-11 ENCOUNTER — TRANSCRIPTION ENCOUNTER (OUTPATIENT)
Age: 78
End: 2023-09-11

## 2023-09-11 VITALS
WEIGHT: 167.99 LBS | RESPIRATION RATE: 16 BRPM | HEIGHT: 71 IN | DIASTOLIC BLOOD PRESSURE: 65 MMHG | HEART RATE: 66 BPM | SYSTOLIC BLOOD PRESSURE: 175 MMHG | OXYGEN SATURATION: 97 % | TEMPERATURE: 98 F

## 2023-09-11 DIAGNOSIS — Z98.49 CATARACT EXTRACTION STATUS, UNSPECIFIED EYE: Chronic | ICD-10-CM

## 2023-09-11 DIAGNOSIS — M43.17 SPONDYLOLISTHESIS, LUMBOSACRAL REGION: ICD-10-CM

## 2023-09-11 DIAGNOSIS — Z98.890 OTHER SPECIFIED POSTPROCEDURAL STATES: Chronic | ICD-10-CM

## 2023-09-11 LAB
ABO RH CONFIRMATION: SIGNIFICANT CHANGE UP
GLUCOSE BLDC GLUCOMTR-MCNC: 108 MG/DL — HIGH (ref 70–99)
GLUCOSE BLDC GLUCOMTR-MCNC: 137 MG/DL — HIGH (ref 70–99)
GLUCOSE BLDC GLUCOMTR-MCNC: 173 MG/DL — HIGH (ref 70–99)

## 2023-09-11 PROCEDURE — 22840 INSERT SPINE FIXATION DEVICE: CPT | Mod: 82

## 2023-09-11 PROCEDURE — 72100 X-RAY EXAM L-S SPINE 2/3 VWS: CPT | Mod: 26

## 2023-09-11 PROCEDURE — 22840 INSERT SPINE FIXATION DEVICE: CPT

## 2023-09-11 PROCEDURE — 22633 ARTHRD CMBN 1NTRSPC LUMBAR: CPT | Mod: 82

## 2023-09-11 PROCEDURE — 72100 X-RAY EXAM L-S SPINE 2/3 VWS: CPT | Mod: 26,77

## 2023-09-11 PROCEDURE — 63052 LAM FACETC/FRMT ARTHRD LUM 1: CPT

## 2023-09-11 PROCEDURE — 22633 ARTHRD CMBN 1NTRSPC LUMBAR: CPT

## 2023-09-11 PROCEDURE — 22853 INSJ BIOMECHANICAL DEVICE: CPT | Mod: 82

## 2023-09-11 PROCEDURE — 63052 LAM FACETC/FRMT ARTHRD LUM 1: CPT | Mod: 82

## 2023-09-11 PROCEDURE — 22853 INSJ BIOMECHANICAL DEVICE: CPT

## 2023-09-11 DEVICE — IMPLANTABLE DEVICE: Type: IMPLANTABLE DEVICE | Status: FUNCTIONAL

## 2023-09-11 DEVICE — ROD PRE-LORDOSED W/LINE 40MM: Type: IMPLANTABLE DEVICE | Status: FUNCTIONAL

## 2023-09-11 DEVICE — TACHOSIL 9.5 X 4.8CM: Type: IMPLANTABLE DEVICE | Status: FUNCTIONAL

## 2023-09-11 DEVICE — DURASEAL: Type: IMPLANTABLE DEVICE | Status: FUNCTIONAL

## 2023-09-11 DEVICE — SCREW ST MIS SNGL INNER VIPER: Type: IMPLANTABLE DEVICE | Status: FUNCTIONAL

## 2023-09-11 DEVICE — FLOSEAL FAST PREP 10ML: Type: IMPLANTABLE DEVICE | Status: FUNCTIONAL

## 2023-09-11 DEVICE — SCREW CORT FIX 7X50MM: Type: IMPLANTABLE DEVICE | Status: FUNCTIONAL

## 2023-09-11 DEVICE — AVITENE: Type: IMPLANTABLE DEVICE | Status: FUNCTIONAL

## 2023-09-11 RX ORDER — DEXTROSE 50 % IN WATER 50 %
25 SYRINGE (ML) INTRAVENOUS ONCE
Refills: 0 | Status: DISCONTINUED | OUTPATIENT
Start: 2023-09-11 | End: 2023-09-14

## 2023-09-11 RX ORDER — CEFAZOLIN SODIUM 1 G
2000 VIAL (EA) INJECTION EVERY 8 HOURS
Refills: 0 | Status: DISCONTINUED | OUTPATIENT
Start: 2023-09-11 | End: 2023-09-11

## 2023-09-11 RX ORDER — LOSARTAN POTASSIUM 100 MG/1
100 TABLET, FILM COATED ORAL DAILY
Refills: 0 | Status: DISCONTINUED | OUTPATIENT
Start: 2023-09-11 | End: 2023-09-14

## 2023-09-11 RX ORDER — SENNA PLUS 8.6 MG/1
2 TABLET ORAL AT BEDTIME
Refills: 0 | Status: DISCONTINUED | OUTPATIENT
Start: 2023-09-11 | End: 2023-09-14

## 2023-09-11 RX ORDER — FENTANYL CITRATE 50 UG/ML
50 INJECTION INTRAVENOUS
Refills: 0 | Status: DISCONTINUED | OUTPATIENT
Start: 2023-09-11 | End: 2023-09-12

## 2023-09-11 RX ORDER — ACETAMINOPHEN 500 MG
650 TABLET ORAL EVERY 6 HOURS
Refills: 0 | Status: DISCONTINUED | OUTPATIENT
Start: 2023-09-11 | End: 2023-09-13

## 2023-09-11 RX ORDER — DEXTROSE 50 % IN WATER 50 %
15 SYRINGE (ML) INTRAVENOUS ONCE
Refills: 0 | Status: DISCONTINUED | OUTPATIENT
Start: 2023-09-11 | End: 2023-09-14

## 2023-09-11 RX ORDER — FENTANYL CITRATE 50 UG/ML
25 INJECTION INTRAVENOUS
Refills: 0 | Status: DISCONTINUED | OUTPATIENT
Start: 2023-09-11 | End: 2023-09-12

## 2023-09-11 RX ORDER — METFORMIN HYDROCHLORIDE 850 MG/1
1 TABLET ORAL
Refills: 0 | DISCHARGE

## 2023-09-11 RX ORDER — LOSARTAN POTASSIUM 100 MG/1
1 TABLET, FILM COATED ORAL
Refills: 0 | DISCHARGE

## 2023-09-11 RX ORDER — POLYETHYLENE GLYCOL 3350 17 G/17G
17 POWDER, FOR SOLUTION ORAL DAILY
Refills: 0 | Status: DISCONTINUED | OUTPATIENT
Start: 2023-09-11 | End: 2023-09-14

## 2023-09-11 RX ORDER — ONDANSETRON 8 MG/1
4 TABLET, FILM COATED ORAL ONCE
Refills: 0 | Status: DISCONTINUED | OUTPATIENT
Start: 2023-09-11 | End: 2023-09-12

## 2023-09-11 RX ORDER — SODIUM CHLORIDE 9 MG/ML
1000 INJECTION, SOLUTION INTRAVENOUS
Refills: 0 | Status: DISCONTINUED | OUTPATIENT
Start: 2023-09-11 | End: 2023-09-14

## 2023-09-11 RX ORDER — KETOROLAC TROMETHAMINE 30 MG/ML
15 SYRINGE (ML) INJECTION ONCE
Refills: 0 | Status: DISCONTINUED | OUTPATIENT
Start: 2023-09-11 | End: 2023-09-11

## 2023-09-11 RX ORDER — ROSUVASTATIN CALCIUM 5 MG/1
1 TABLET ORAL
Refills: 0 | DISCHARGE

## 2023-09-11 RX ORDER — OXYCODONE HYDROCHLORIDE 5 MG/1
10 TABLET ORAL EVERY 4 HOURS
Refills: 0 | Status: DISCONTINUED | OUTPATIENT
Start: 2023-09-11 | End: 2023-09-14

## 2023-09-11 RX ORDER — METHOCARBAMOL 500 MG/1
750 TABLET, FILM COATED ORAL EVERY 8 HOURS
Refills: 0 | Status: DISCONTINUED | OUTPATIENT
Start: 2023-09-11 | End: 2023-09-14

## 2023-09-11 RX ORDER — DEXTROSE 50 % IN WATER 50 %
12.5 SYRINGE (ML) INTRAVENOUS ONCE
Refills: 0 | Status: DISCONTINUED | OUTPATIENT
Start: 2023-09-11 | End: 2023-09-14

## 2023-09-11 RX ORDER — ACETAMINOPHEN 500 MG
975 TABLET ORAL ONCE
Refills: 0 | Status: COMPLETED | OUTPATIENT
Start: 2023-09-11 | End: 2023-09-11

## 2023-09-11 RX ORDER — ONDANSETRON 8 MG/1
4 TABLET, FILM COATED ORAL EVERY 6 HOURS
Refills: 0 | Status: DISCONTINUED | OUTPATIENT
Start: 2023-09-11 | End: 2023-09-14

## 2023-09-11 RX ORDER — OXYCODONE HYDROCHLORIDE 5 MG/1
5 TABLET ORAL EVERY 4 HOURS
Refills: 0 | Status: DISCONTINUED | OUTPATIENT
Start: 2023-09-11 | End: 2023-09-14

## 2023-09-11 RX ORDER — CEFAZOLIN SODIUM 1 G
2000 VIAL (EA) INJECTION EVERY 8 HOURS
Refills: 0 | Status: COMPLETED | OUTPATIENT
Start: 2023-09-11 | End: 2023-09-12

## 2023-09-11 RX ORDER — ATORVASTATIN CALCIUM 80 MG/1
40 TABLET, FILM COATED ORAL AT BEDTIME
Refills: 0 | Status: DISCONTINUED | OUTPATIENT
Start: 2023-09-11 | End: 2023-09-14

## 2023-09-11 RX ORDER — ATENOLOL 25 MG/1
25 TABLET ORAL DAILY
Refills: 0 | Status: DISCONTINUED | OUTPATIENT
Start: 2023-09-11 | End: 2023-09-14

## 2023-09-11 RX ORDER — ACETAMINOPHEN 500 MG
1000 TABLET ORAL ONCE
Refills: 0 | Status: COMPLETED | OUTPATIENT
Start: 2023-09-11 | End: 2023-09-11

## 2023-09-11 RX ORDER — ASPIRIN/CALCIUM CARB/MAGNESIUM 324 MG
1 TABLET ORAL
Refills: 0 | DISCHARGE

## 2023-09-11 RX ORDER — ATENOLOL 25 MG/1
1 TABLET ORAL
Refills: 0 | DISCHARGE

## 2023-09-11 RX ORDER — GLUCAGON INJECTION, SOLUTION 0.5 MG/.1ML
1 INJECTION, SOLUTION SUBCUTANEOUS ONCE
Refills: 0 | Status: DISCONTINUED | OUTPATIENT
Start: 2023-09-11 | End: 2023-09-14

## 2023-09-11 RX ADMIN — FENTANYL CITRATE 25 MICROGRAM(S): 50 INJECTION INTRAVENOUS at 23:13

## 2023-09-11 RX ADMIN — Medication 15 MILLIGRAM(S): at 23:43

## 2023-09-11 RX ADMIN — FENTANYL CITRATE 25 MICROGRAM(S): 50 INJECTION INTRAVENOUS at 23:03

## 2023-09-11 RX ADMIN — METHOCARBAMOL 750 MILLIGRAM(S): 500 TABLET, FILM COATED ORAL at 23:51

## 2023-09-11 RX ADMIN — Medication 1000 MILLIGRAM(S): at 23:42

## 2023-09-11 RX ADMIN — Medication 15 MILLIGRAM(S): at 23:32

## 2023-09-11 RX ADMIN — Medication 400 MILLIGRAM(S): at 23:32

## 2023-09-11 RX ADMIN — FENTANYL CITRATE 25 MICROGRAM(S): 50 INJECTION INTRAVENOUS at 23:35

## 2023-09-11 RX ADMIN — FENTANYL CITRATE 25 MICROGRAM(S): 50 INJECTION INTRAVENOUS at 23:25

## 2023-09-11 RX ADMIN — Medication 975 MILLIGRAM(S): at 10:28

## 2023-09-11 NOTE — ASU PREOP CHECKLIST - ASSESSMENT, HISTORY & PHYSICAL COMPLETED AND ON MEDICAL RECORD
Spoke to pt schedule her to see dr phelps per the provider for 02/06/17 at 3:15 pm at [place] [id number]. Pt verbalized she understood.    done

## 2023-09-11 NOTE — BRIEF OPERATIVE NOTE - OPERATION/FINDINGS
Lumbar midline incision at L4-S1 region, R pars interarticularis fracture of L5 seen, decompression of L5-S1 region performed. Dura and nerve roots visualized, potential L sided dural defect but used duragel, duragen, no CSF visualized. Lumbar midline incision at L5-S1 region, R pars interarticularis fracture of L5 seen, decompression of L5-S1 region performed, right sided approach for transforaminal interbody fusion, bilateral L5 foraminotomies, dura and nerve roots visualized, potential L sided dural defect repaired with tachosil and duraseal, no CSF visualized.

## 2023-09-11 NOTE — BRIEF OPERATIVE NOTE - COMMENTS
Lumbar incision closed with staples and dressed with mepalex. Epidural GIL drain x1 to full suction, purse string no mercy stitch, dressed with tegaderm and guaze. Neuromonitoring at baseline throughout and at end of procedure Lumbar incision closed with staples and dressed with mepalex. Epidural GIL drain x1 to full suction, purse string no mercy stitch, dressed with tegaderm and guaze. Neuromonitoring at baseline throughout and at end of procedure.    I served as assistant surgeon for the entirety of the case to the primary surgeon, Dr. Kenyatta Pollard, as no qualified resident or fellow was available to assist. - Hien Warner MD

## 2023-09-12 LAB
ANION GAP SERPL CALC-SCNC: 12 MMOL/L — SIGNIFICANT CHANGE UP (ref 5–17)
BASOPHILS # BLD AUTO: 0.01 K/UL — SIGNIFICANT CHANGE UP (ref 0–0.2)
BASOPHILS NFR BLD AUTO: 0.1 % — SIGNIFICANT CHANGE UP (ref 0–2)
BUN SERPL-MCNC: 14.2 MG/DL — SIGNIFICANT CHANGE UP (ref 8–20)
CALCIUM SERPL-MCNC: 8.3 MG/DL — LOW (ref 8.4–10.5)
CHLORIDE SERPL-SCNC: 97 MMOL/L — SIGNIFICANT CHANGE UP (ref 96–108)
CO2 SERPL-SCNC: 24 MMOL/L — SIGNIFICANT CHANGE UP (ref 22–29)
CREAT SERPL-MCNC: 1.03 MG/DL — SIGNIFICANT CHANGE UP (ref 0.5–1.3)
EGFR: 76 ML/MIN/1.73M2 — SIGNIFICANT CHANGE UP
EOSINOPHIL # BLD AUTO: 0 K/UL — SIGNIFICANT CHANGE UP (ref 0–0.5)
EOSINOPHIL NFR BLD AUTO: 0 % — SIGNIFICANT CHANGE UP (ref 0–6)
GLUCOSE BLDC GLUCOMTR-MCNC: 134 MG/DL — HIGH (ref 70–99)
GLUCOSE BLDC GLUCOMTR-MCNC: 171 MG/DL — HIGH (ref 70–99)
GLUCOSE BLDC GLUCOMTR-MCNC: 192 MG/DL — HIGH (ref 70–99)
GLUCOSE BLDC GLUCOMTR-MCNC: 257 MG/DL — HIGH (ref 70–99)
GLUCOSE SERPL-MCNC: 208 MG/DL — HIGH (ref 70–99)
HCT VFR BLD CALC: 30.2 % — LOW (ref 39–50)
HGB BLD-MCNC: 11 G/DL — LOW (ref 13–17)
IMM GRANULOCYTES NFR BLD AUTO: 0.4 % — SIGNIFICANT CHANGE UP (ref 0–0.9)
LYMPHOCYTES # BLD AUTO: 0.83 K/UL — LOW (ref 1–3.3)
LYMPHOCYTES # BLD AUTO: 8.2 % — LOW (ref 13–44)
MAGNESIUM SERPL-MCNC: 1.5 MG/DL — LOW (ref 1.6–2.6)
MCHC RBC-ENTMCNC: 31.4 PG — SIGNIFICANT CHANGE UP (ref 27–34)
MCHC RBC-ENTMCNC: 36.4 GM/DL — HIGH (ref 32–36)
MCV RBC AUTO: 86.3 FL — SIGNIFICANT CHANGE UP (ref 80–100)
MONOCYTES # BLD AUTO: 0.55 K/UL — SIGNIFICANT CHANGE UP (ref 0–0.9)
MONOCYTES NFR BLD AUTO: 5.4 % — SIGNIFICANT CHANGE UP (ref 2–14)
NEUTROPHILS # BLD AUTO: 8.73 K/UL — HIGH (ref 1.8–7.4)
NEUTROPHILS NFR BLD AUTO: 85.9 % — HIGH (ref 43–77)
PHOSPHATE SERPL-MCNC: 3.9 MG/DL — SIGNIFICANT CHANGE UP (ref 2.4–4.7)
PLATELET # BLD AUTO: 185 K/UL — SIGNIFICANT CHANGE UP (ref 150–400)
POTASSIUM SERPL-MCNC: 4.4 MMOL/L — SIGNIFICANT CHANGE UP (ref 3.5–5.3)
POTASSIUM SERPL-SCNC: 4.4 MMOL/L — SIGNIFICANT CHANGE UP (ref 3.5–5.3)
RBC # BLD: 3.5 M/UL — LOW (ref 4.2–5.8)
RBC # FLD: 12.3 % — SIGNIFICANT CHANGE UP (ref 10.3–14.5)
SODIUM SERPL-SCNC: 133 MMOL/L — LOW (ref 135–145)
WBC # BLD: 10.16 K/UL — SIGNIFICANT CHANGE UP (ref 3.8–10.5)
WBC # FLD AUTO: 10.16 K/UL — SIGNIFICANT CHANGE UP (ref 3.8–10.5)

## 2023-09-12 PROCEDURE — 72131 CT LUMBAR SPINE W/O DYE: CPT | Mod: 26

## 2023-09-12 RX ORDER — TOBRAMYCIN 0.3 %
1 DROPS OPHTHALMIC (EYE) ONCE
Refills: 0 | Status: COMPLETED | OUTPATIENT
Start: 2023-09-12 | End: 2023-09-12

## 2023-09-12 RX ORDER — INFLUENZA VIRUS VACCINE 15; 15; 15; 15 UG/.5ML; UG/.5ML; UG/.5ML; UG/.5ML
0.7 SUSPENSION INTRAMUSCULAR ONCE
Refills: 0 | Status: DISCONTINUED | OUTPATIENT
Start: 2023-09-12 | End: 2023-09-14

## 2023-09-12 RX ORDER — INSULIN LISPRO 100/ML
VIAL (ML) SUBCUTANEOUS
Refills: 0 | Status: DISCONTINUED | OUTPATIENT
Start: 2023-09-12 | End: 2023-09-14

## 2023-09-12 RX ORDER — ENOXAPARIN SODIUM 100 MG/ML
40 INJECTION SUBCUTANEOUS EVERY 24 HOURS
Refills: 0 | Status: DISCONTINUED | OUTPATIENT
Start: 2023-09-12 | End: 2023-09-14

## 2023-09-12 RX ORDER — TOBRAMYCIN 0.3 %
1 DROPS OPHTHALMIC (EYE) ONCE
Refills: 0 | Status: DISCONTINUED | OUTPATIENT
Start: 2023-09-12 | End: 2023-09-12

## 2023-09-12 RX ORDER — INSULIN LISPRO 100/ML
VIAL (ML) SUBCUTANEOUS AT BEDTIME
Refills: 0 | Status: DISCONTINUED | OUTPATIENT
Start: 2023-09-12 | End: 2023-09-14

## 2023-09-12 RX ADMIN — ENOXAPARIN SODIUM 40 MILLIGRAM(S): 100 INJECTION SUBCUTANEOUS at 22:22

## 2023-09-12 RX ADMIN — ATENOLOL 25 MILLIGRAM(S): 25 TABLET ORAL at 05:29

## 2023-09-12 RX ADMIN — OXYCODONE HYDROCHLORIDE 10 MILLIGRAM(S): 5 TABLET ORAL at 03:30

## 2023-09-12 RX ADMIN — LOSARTAN POTASSIUM 100 MILLIGRAM(S): 100 TABLET, FILM COATED ORAL at 05:28

## 2023-09-12 RX ADMIN — Medication 2000 MILLIGRAM(S): at 14:17

## 2023-09-12 RX ADMIN — ATORVASTATIN CALCIUM 40 MILLIGRAM(S): 80 TABLET, FILM COATED ORAL at 22:22

## 2023-09-12 RX ADMIN — OXYCODONE HYDROCHLORIDE 10 MILLIGRAM(S): 5 TABLET ORAL at 21:35

## 2023-09-12 RX ADMIN — Medication 1: at 17:12

## 2023-09-12 RX ADMIN — OXYCODONE HYDROCHLORIDE 10 MILLIGRAM(S): 5 TABLET ORAL at 20:35

## 2023-09-12 RX ADMIN — OXYCODONE HYDROCHLORIDE 10 MILLIGRAM(S): 5 TABLET ORAL at 02:40

## 2023-09-12 RX ADMIN — Medication 1 DROP(S): at 02:37

## 2023-09-12 RX ADMIN — POLYETHYLENE GLYCOL 3350 17 GRAM(S): 17 POWDER, FOR SOLUTION ORAL at 11:56

## 2023-09-12 RX ADMIN — Medication 2000 MILLIGRAM(S): at 05:29

## 2023-09-12 RX ADMIN — SENNA PLUS 2 TABLET(S): 8.6 TABLET ORAL at 22:22

## 2023-09-12 RX ADMIN — Medication 3: at 11:53

## 2023-09-12 RX ADMIN — Medication 1 DROP(S): at 05:48

## 2023-09-13 LAB
ANION GAP SERPL CALC-SCNC: 10 MMOL/L — SIGNIFICANT CHANGE UP (ref 5–17)
BUN SERPL-MCNC: 11.8 MG/DL — SIGNIFICANT CHANGE UP (ref 8–20)
CA-I BLD-SCNC: 1.19 MMOL/L — SIGNIFICANT CHANGE UP (ref 1.15–1.33)
CALCIUM SERPL-MCNC: 9.2 MG/DL — SIGNIFICANT CHANGE UP (ref 8.4–10.5)
CHLORIDE SERPL-SCNC: 99 MMOL/L — SIGNIFICANT CHANGE UP (ref 96–108)
CO2 SERPL-SCNC: 28 MMOL/L — SIGNIFICANT CHANGE UP (ref 22–29)
CREAT SERPL-MCNC: 0.71 MG/DL — SIGNIFICANT CHANGE UP (ref 0.5–1.3)
EGFR: 96 ML/MIN/1.73M2 — SIGNIFICANT CHANGE UP
GLUCOSE BLDC GLUCOMTR-MCNC: 126 MG/DL — HIGH (ref 70–99)
GLUCOSE BLDC GLUCOMTR-MCNC: 156 MG/DL — HIGH (ref 70–99)
GLUCOSE BLDC GLUCOMTR-MCNC: 211 MG/DL — HIGH (ref 70–99)
GLUCOSE SERPL-MCNC: 122 MG/DL — HIGH (ref 70–99)
HCT VFR BLD CALC: 32.2 % — LOW (ref 39–50)
HGB BLD-MCNC: 10.9 G/DL — LOW (ref 13–17)
MCHC RBC-ENTMCNC: 30.4 PG — SIGNIFICANT CHANGE UP (ref 27–34)
MCHC RBC-ENTMCNC: 33.9 GM/DL — SIGNIFICANT CHANGE UP (ref 32–36)
MCV RBC AUTO: 89.7 FL — SIGNIFICANT CHANGE UP (ref 80–100)
PLATELET # BLD AUTO: 194 K/UL — SIGNIFICANT CHANGE UP (ref 150–400)
POTASSIUM SERPL-MCNC: 4.1 MMOL/L — SIGNIFICANT CHANGE UP (ref 3.5–5.3)
POTASSIUM SERPL-SCNC: 4.1 MMOL/L — SIGNIFICANT CHANGE UP (ref 3.5–5.3)
RBC # BLD: 3.59 M/UL — LOW (ref 4.2–5.8)
RBC # FLD: 12.9 % — SIGNIFICANT CHANGE UP (ref 10.3–14.5)
SODIUM SERPL-SCNC: 137 MMOL/L — SIGNIFICANT CHANGE UP (ref 135–145)
WBC # BLD: 9.89 K/UL — SIGNIFICANT CHANGE UP (ref 3.8–10.5)
WBC # FLD AUTO: 9.89 K/UL — SIGNIFICANT CHANGE UP (ref 3.8–10.5)

## 2023-09-13 RX ORDER — ACETAMINOPHEN 500 MG
650 TABLET ORAL EVERY 6 HOURS
Refills: 0 | Status: DISCONTINUED | OUTPATIENT
Start: 2023-09-13 | End: 2023-09-14

## 2023-09-13 RX ADMIN — OXYCODONE HYDROCHLORIDE 10 MILLIGRAM(S): 5 TABLET ORAL at 23:21

## 2023-09-13 RX ADMIN — OXYCODONE HYDROCHLORIDE 10 MILLIGRAM(S): 5 TABLET ORAL at 08:44

## 2023-09-13 RX ADMIN — OXYCODONE HYDROCHLORIDE 10 MILLIGRAM(S): 5 TABLET ORAL at 16:23

## 2023-09-13 RX ADMIN — OXYCODONE HYDROCHLORIDE 10 MILLIGRAM(S): 5 TABLET ORAL at 22:21

## 2023-09-13 RX ADMIN — OXYCODONE HYDROCHLORIDE 10 MILLIGRAM(S): 5 TABLET ORAL at 02:35

## 2023-09-13 RX ADMIN — ENOXAPARIN SODIUM 40 MILLIGRAM(S): 100 INJECTION SUBCUTANEOUS at 22:21

## 2023-09-13 RX ADMIN — LOSARTAN POTASSIUM 100 MILLIGRAM(S): 100 TABLET, FILM COATED ORAL at 05:22

## 2023-09-13 RX ADMIN — OXYCODONE HYDROCHLORIDE 10 MILLIGRAM(S): 5 TABLET ORAL at 14:26

## 2023-09-13 RX ADMIN — OXYCODONE HYDROCHLORIDE 10 MILLIGRAM(S): 5 TABLET ORAL at 13:12

## 2023-09-13 RX ADMIN — OXYCODONE HYDROCHLORIDE 10 MILLIGRAM(S): 5 TABLET ORAL at 03:35

## 2023-09-13 RX ADMIN — OXYCODONE HYDROCHLORIDE 10 MILLIGRAM(S): 5 TABLET ORAL at 10:25

## 2023-09-13 RX ADMIN — SENNA PLUS 2 TABLET(S): 8.6 TABLET ORAL at 22:21

## 2023-09-13 RX ADMIN — POLYETHYLENE GLYCOL 3350 17 GRAM(S): 17 POWDER, FOR SOLUTION ORAL at 11:53

## 2023-09-13 RX ADMIN — ATORVASTATIN CALCIUM 40 MILLIGRAM(S): 80 TABLET, FILM COATED ORAL at 22:22

## 2023-09-13 RX ADMIN — OXYCODONE HYDROCHLORIDE 10 MILLIGRAM(S): 5 TABLET ORAL at 17:23

## 2023-09-13 RX ADMIN — Medication 2: at 11:53

## 2023-09-13 NOTE — PHYSICAL THERAPY INITIAL EVALUATION ADULT - ADDITIONAL COMMENTS
Pt reports independent PTA, occasional use of SAC. Pt owns RW. +, independent ADLs. Supportive family

## 2023-09-13 NOTE — PHYSICAL THERAPY INITIAL EVALUATION ADULT - PERTINENT HX OF CURRENT PROBLEM, REHAB EVAL
Pt is 65M with PMH DM II, HTN, HLD, cardiac murmur, carotid stenosis, CAD presents to PST today. Pt. reports pain to lower back his entire life and completed 2 lower back surgeries 2 years ago. Described pain as sharp with  radiation to LLE. Now s/p L5-S1 decompression and interbody fusion

## 2023-09-13 NOTE — OCCUPATIONAL THERAPY INITIAL EVALUATION ADULT - LEVEL OF INDEPENDENCE: DRESS UPPER BODY, OT EVAL
to don gown and LSO brace/minimum assist (75% patients effort)/moderate assist (50% patients effort)

## 2023-09-14 ENCOUNTER — TRANSCRIPTION ENCOUNTER (OUTPATIENT)
Age: 78
End: 2023-09-14

## 2023-09-14 VITALS
DIASTOLIC BLOOD PRESSURE: 70 MMHG | SYSTOLIC BLOOD PRESSURE: 116 MMHG | TEMPERATURE: 99 F | OXYGEN SATURATION: 98 % | HEART RATE: 70 BPM | RESPIRATION RATE: 17 BRPM

## 2023-09-14 LAB
GLUCOSE BLDC GLUCOMTR-MCNC: 120 MG/DL — HIGH (ref 70–99)
GLUCOSE BLDC GLUCOMTR-MCNC: 155 MG/DL — HIGH (ref 70–99)
GLUCOSE BLDC GLUCOMTR-MCNC: 166 MG/DL — HIGH (ref 70–99)

## 2023-09-14 PROCEDURE — 80048 BASIC METABOLIC PNL TOTAL CA: CPT

## 2023-09-14 PROCEDURE — 85027 COMPLETE CBC AUTOMATED: CPT

## 2023-09-14 PROCEDURE — C1889: CPT

## 2023-09-14 PROCEDURE — 83735 ASSAY OF MAGNESIUM: CPT

## 2023-09-14 PROCEDURE — 36415 COLL VENOUS BLD VENIPUNCTURE: CPT

## 2023-09-14 PROCEDURE — 72100 X-RAY EXAM L-S SPINE 2/3 VWS: CPT

## 2023-09-14 PROCEDURE — 72131 CT LUMBAR SPINE W/O DYE: CPT

## 2023-09-14 PROCEDURE — 85025 COMPLETE CBC W/AUTO DIFF WBC: CPT

## 2023-09-14 PROCEDURE — 97167 OT EVAL HIGH COMPLEX 60 MIN: CPT

## 2023-09-14 PROCEDURE — 82962 GLUCOSE BLOOD TEST: CPT

## 2023-09-14 PROCEDURE — C1713: CPT

## 2023-09-14 PROCEDURE — 82330 ASSAY OF CALCIUM: CPT

## 2023-09-14 PROCEDURE — 84100 ASSAY OF PHOSPHORUS: CPT

## 2023-09-14 RX ORDER — METHOCARBAMOL 500 MG/1
1 TABLET, FILM COATED ORAL
Qty: 42 | Refills: 0
Start: 2023-09-14 | End: 2023-09-27

## 2023-09-14 RX ORDER — SENNA PLUS 8.6 MG/1
2 TABLET ORAL
Qty: 14 | Refills: 0
Start: 2023-09-14 | End: 2023-09-20

## 2023-09-14 RX ORDER — GABAPENTIN 400 MG/1
1 CAPSULE ORAL
Qty: 28 | Refills: 0
Start: 2023-09-14 | End: 2023-10-11

## 2023-09-14 RX ORDER — OXYCODONE HYDROCHLORIDE 5 MG/1
2 TABLET ORAL
Qty: 84 | Refills: 0
Start: 2023-09-14 | End: 2023-09-20

## 2023-09-14 RX ORDER — ACETAMINOPHEN 500 MG
2 TABLET ORAL
Qty: 0 | Refills: 0 | DISCHARGE
Start: 2023-09-14

## 2023-09-14 RX ADMIN — Medication 1: at 08:15

## 2023-09-14 RX ADMIN — Medication 650 MILLIGRAM(S): at 06:15

## 2023-09-14 RX ADMIN — OXYCODONE HYDROCHLORIDE 10 MILLIGRAM(S): 5 TABLET ORAL at 04:38

## 2023-09-14 RX ADMIN — OXYCODONE HYDROCHLORIDE 10 MILLIGRAM(S): 5 TABLET ORAL at 16:33

## 2023-09-14 RX ADMIN — Medication 650 MILLIGRAM(S): at 05:15

## 2023-09-14 RX ADMIN — Medication 650 MILLIGRAM(S): at 12:08

## 2023-09-14 RX ADMIN — OXYCODONE HYDROCHLORIDE 10 MILLIGRAM(S): 5 TABLET ORAL at 09:38

## 2023-09-14 RX ADMIN — LOSARTAN POTASSIUM 100 MILLIGRAM(S): 100 TABLET, FILM COATED ORAL at 05:15

## 2023-09-14 RX ADMIN — OXYCODONE HYDROCHLORIDE 10 MILLIGRAM(S): 5 TABLET ORAL at 05:38

## 2023-09-14 RX ADMIN — ATENOLOL 25 MILLIGRAM(S): 25 TABLET ORAL at 05:15

## 2023-09-14 RX ADMIN — Medication 650 MILLIGRAM(S): at 13:05

## 2023-09-14 NOTE — DIETITIAN INITIAL EVALUATION ADULT - PERTINENT MEDS FT
MEDICATIONS  (STANDING):  acetaminophen     Tablet .. 650 milliGRAM(s) Oral every 6 hours  atenolol  Tablet 25 milliGRAM(s) Oral daily  atorvastatin 40 milliGRAM(s) Oral at bedtime  dextrose 5%. 1000 milliLiter(s) (100 mL/Hr) IV Continuous <Continuous>  dextrose 5%. 1000 milliLiter(s) (50 mL/Hr) IV Continuous <Continuous>  dextrose 50% Injectable 25 Gram(s) IV Push once  dextrose 50% Injectable 12.5 Gram(s) IV Push once  dextrose 50% Injectable 25 Gram(s) IV Push once  enoxaparin Injectable 40 milliGRAM(s) SubCutaneous every 24 hours  glucagon  Injectable 1 milliGRAM(s) IntraMuscular once  influenza  Vaccine (HIGH DOSE) 0.7 milliLiter(s) IntraMuscular once  insulin lispro (ADMELOG) corrective regimen sliding scale   SubCutaneous three times a day before meals  insulin lispro (ADMELOG) corrective regimen sliding scale   SubCutaneous at bedtime  losartan 100 milliGRAM(s) Oral daily  polyethylene glycol 3350 17 Gram(s) Oral daily  senna 2 Tablet(s) Oral at bedtime    MEDICATIONS  (PRN):  dextrose Oral Gel 15 Gram(s) Oral once PRN Blood Glucose LESS THAN 70 milliGRAM(s)/deciliter  methocarbamol 750 milliGRAM(s) Oral every 8 hours PRN Muscle Spasm  ondansetron Injectable 4 milliGRAM(s) IV Push every 6 hours PRN Nausea and/or Vomiting  oxyCODONE    IR 10 milliGRAM(s) Oral every 4 hours PRN Severe Pain (7 - 10)  oxyCODONE    IR 5 milliGRAM(s) Oral every 4 hours PRN Moderate Pain (4 - 6)

## 2023-09-14 NOTE — DISCHARGE NOTE PROVIDER - NSDCFUSCHEDAPPT_GEN_ALL_CORE_FT
Kenyatta Pollard Physician [facility]  NEUROSURG [address] [place] [address]  Scheduled Appointment: 09/28/2023

## 2023-09-14 NOTE — DISCHARGE NOTE PROVIDER - NSDCCPTREATMENT_GEN_ALL_CORE_FT
PRINCIPAL PROCEDURE  Procedure: Posterior lumbar interbody fusion (PLIF)  Findings and Treatment:

## 2023-09-14 NOTE — DISCHARGE NOTE NURSING/CASE MANAGEMENT/SOCIAL WORK - PATIENT PORTAL LINK FT
You can access the FollowMyHealth Patient Portal offered by [facility] by registering at the following website: [email or web address]. By joining [facility]’s FollowMyHealth portal, you will also be able to view your health information using other applications (apps) compatible with our system.

## 2023-09-14 NOTE — PROGRESS NOTE ADULT - ASSESSMENT
ASSESSMENT:   66yo M w/ PMHx of DM II, HTN, HLD, cardiac murmur, carotid stenosis, CAD now POD#1 s/p L5-S1 decompression and interbody fusion.       PLAN:    -Continue q4h neuro checks  -Post-op CT L-spine still pending  -Pain control prn  -Ancef with drain in  -Epidural GIL drain to full suction  -LSO brace prn for comfort, brace delivered to bedside  -D/c calles, TOV, bladder scan q6h  -consistent carb diet, ISS  -Bowel regimen: senna, miralax; monitor stool count  -VTE prophylaxis: SCDs, lovenox  -Discussed case with Dr. Pollard  
ASSESSMENT:  76yo M w/ PMHx of DMII, HTN, HLD, cardiac murmur, carotid stenosis, CAD now POD #2 s/p L5-S1 decompression and interbody fusion      PLAN:   - continue q4h neuro checks  - plan for removal of GIL drain today  - pain control prn  - LSO brace prn for comfort  - bowel regimen: senna, miralax; monitor stool count  - VTE ppx: SCDs, lovenox  
77M POD 0  L5-S1 decompression and interbody fusion     Plan   - Pain: PRN Tylenol, oxy 5/10, robaxin   - ADAT, senna/miralax   - Epidural drain to self suction   - Ancef 2 q8 x2 doses   - Post op CT Lumbar spine in AM   - SCDs for dvt ppx, start Lovenox tomorrow   - LSO brace when OOB   
ASSESSMENT:  78yo M w/ PMHx of DMII, HTN, HLD, cardiac murmur, carotid stenosis, CAD, now POD #3 s/p L5-S1 decompression and interbody fusion      PLAN:   -  Cleared by PT for home with rolling walker; Pt is not comfortable going home without assistance. Dispo pending case management   - continue q4h neuro checks  - pain control prn  - LSO brace prn for comfort  - bowel regimen: senna, miralax; monitor stool count  - VTE ppx: SCDs, lovenox

## 2023-09-14 NOTE — DISCHARGE NOTE PROVIDER - CARE PROVIDER_API CALL
Kenyatta Pollard  Neurosurgery  [address] [place] [id number]  Phone: [phone]  Fax: [phone]  Established Patient  Follow Up Time: 2 weeks

## 2023-09-14 NOTE — DIETITIAN INITIAL EVALUATION ADULT - ADD RECOMMEND
1) Glucerna 1x day to optimize po intake and provide an additional 350 kcal, 20g protein per serving   2) High protein jello  3) MVI daily

## 2023-09-14 NOTE — PROGRESS NOTE ADULT - SUBJECTIVE AND OBJECTIVE BOX
Post Op Check     77M s/p L5-S1 decompression and interbody fusion   EBL 400cc, In 4L, uop 400cc     Patient seen and examined in PACU, complaining of pain, RN about to give Fentanyl push.     Vital Signs Last 24 Hrs  T(C): 36.3 (11 Sep 2023 22:05), Max: 36.4 (11 Sep 2023 10:06)  T(F): 97.3 (11 Sep 2023 22:05), Max: 97.5 (11 Sep 2023 10:06)  HR: 93 (11 Sep 2023 22:20) (66 - 93)  BP: 153/78 (11 Sep 2023 22:20) (140/66 - 175/65)  BP(mean): 95 (11 Sep 2023 22:20) (86 - 95)  RR: 20 (11 Sep 2023 22:20) (11 - 20)  SpO2: 98% (11 Sep 2023 22:20) (97% - 98%)    Parameters below as of 11 Sep 2023 22:20  Patient On (Oxygen Delivery Method): room air    Physical Exam   Gen: NAD   Neuro: A+Ox3, CN 2-12 grossly intact. 5/5 b/l UE. DF/PF 4/5 b/l, KE / KF 3/5 b/l, HF 2/5 b/l   Sensation: intact to light touch throughout   + calles   
SUBJECTIVE:   65M with PMH DM II, HTN, HLD, cardiac murmur, carotid stenosis, CAD presents to PST today. Pt. reports pain to lower back his entire life and completed 2 lower back surgeries 2 years ago. Described pain as sharp with  radiation to LLE. Reports pain for more than 5 years. Completed cortisone injections previously with the last one 2022 with short term relief. Reports cortisone injections have greatly elevated his blood glucose level.  Pt. reports pain is constant. Standing have been exacerbating pain. Sitting alleviate pain. Pain levels include a current pain level of 8/10, a minimum pain level of 5/10 and a maximum pain level of 9/10. Reports pain is affecting his ADL's. Difficulty with putting on his pants. Scheduled for L5-S1 decompression and interbody fusion on 9/11/2023.       INTERVAL HX/OVERNIGHT EVENTS:  Pt now POD#1 s/p L5-S1 decompression and interbody fusion.  Patient seen and examined at bedside. Pt reports some back pain but well controlled with current medication. Does complain of b/l toe numbness. GIL drain 140cc yesterday. Post-op CT L-spine still pending.     Vital Signs Last 24 Hrs  T(C): 37.1 (09-12-23 @ 09:14), Max: 37.1 (09-12-23 @ 09:14)  T(F): 98.7 (09-12-23 @ 09:14), Max: 98.7 (09-12-23 @ 09:14)  HR: 67 (09-12-23 @ 09:14) (67 - 95)  BP: 163/61 (09-12-23 @ 09:14) (119/67 - 176/81)  BP(mean): 73 (09-12-23 @ 01:00) (73 - 105)  RR: 18 (09-12-23 @ 09:14) (11 - 20)  SpO2: 96% (09-12-23 @ 09:14) (96% - 99%)    PHYSICAL EXAM:    GENERAL: NAD    INCISION: dressing in place, c/d/i  DRAINS: Subfascial GIL drain with serosanguinous drainage  HEAD: normocephalic   MENTAL STATUS: AAO x 3, conversant, following simple commands    CRANIAL NERVES: PERRL, EOMI without nystagmus. Face symmetric, Tongue is midline. Hearing grossly intact. Head turning and shoulder shrug intact.    REFLEXES: No pronator drift   MOTOR: strength 5/5 b/l UEs; B/l LEs HF/KE 4+/5, DF/EHL/PF 5/5  SENSATION: grossly intact to light touch all extremities   SKIN: Warm, dry    LABS:                          11.0   10.16 )-----------( 185      ( 12 Sep 2023 05:32 )             30.2    09-12    133<L>  |  97  |  14.2  ----------------------------<  208<H>  4.4   |  24.0  |  1.03    Ca    8.3<L>      12 Sep 2023 05:32  Phos  3.9     09-12  Mg     1.5     09-12 09-11 @ 07:01  -  09-12 @ 07:00  --------------------------------------------------------  IN: 380 mL / OUT: 765 mL / NET: -385 mL        IMAGING:     Xray Lumbar Spine AP + Lateral (09.11.23 @ 21:01)     Status post spinal fusion fixation of the L5-S1 with interpedicular   screws and vertical rods. Intervertebral L4-5 prosthetic disc in place.  IMPRESSION:   Postoperative fusion L5-S1 vertebra as described.      MR Lumbar Spine No Cont (08.29.23 @ 17:26)     Status post posterior decompression at L5-S1.    Multilevel degenerative changes throughout the lumbar spine, as detailed   above, worst at L5-S1.    Grade 1 anterolisthesis of L5 on S1. Diminutive appearance of the left   pars interarticularis of L5, which may represent a pars defect. Consider   further evaluation with lumbar spine CT.    Chronic mild compression fracture of L5.        
   Oracio was awake and alert in bed and family members were present as I measured and sized an Aspen LSO for him to use OOB. He was instructed on donning and adjustment of the brace and given printed instructions and contact info. Brace was labeled and placed on window sill for later use.      [facility]  [phone]
s/p L5-S1 decompression. pt. c/o right eye pain only after being transferred to floor.  Pt. seen at bedside.  pt. denies change in vision.  pt. prescribed tetracaine eye drop. eye pain relieved.  also prescribed antibiotic  ointment.  
HPI:   77M with PMH DM II, HTN, HLD, cardiac murmur, carotid stenosis, CAD presents to PST today. Pt. reports pain to lower back his entire life and completed 2 lower back surgeries 2 years ago. Described pain as sharp with  radiation to LLE. Reports pain for more than 5 years. Completed cortisone injections previously with the last one 2022 with short term relief. Reports cortisone injections have greatly elevated his blood glucose level.  Pt. reports pain is constant. Standing have been exacerbating pain. Sitting alleviate pain. Pain levels include a current pain level of 8/10, a minimum pain level of 5/10 and a maximum pain level of 9/10. Reports pain is affecting his ADL's. Difficulty with putting on his pants. Scheduled for L5-S1 decompression and interbody fusion on 9/11/2023.       INTERVAL HPI/OVERNIGHT EVENTS:  Pt is now POD#2 s/p L5-S1 decompression and interbody fusion. Patient seen and examined at bedside. Reports back pain that is controlled with current medication regimen. GIL drain 25cc since yesterday. Post-op CTL done yesterday, post-op changes noted.     Vital Signs Last 24 Hrs  T(C): 36.8 (13 Sep 2023 12:32), Max: 36.9 (13 Sep 2023 00:00)  T(F): 98.3 (13 Sep 2023 12:32), Max: 98.4 (13 Sep 2023 00:00)  HR: 78 (13 Sep 2023 12:32) (59 - 78)  BP: 111/66 (13 Sep 2023 12:32) (111/66 - 154/66)  BP(mean): --  RR: 18 (13 Sep 2023 12:32) (18 - 18)  SpO2: 98% (13 Sep 2023 12:32) (94% - 98%)    Parameters below as of 13 Sep 2023 12:32  Patient On (Oxygen Delivery Method): room air    PHYSICAL EXAM:  GENERAL: NAD  HEAD:  Atraumatic, normocephalic  DRAINS: subfascial GIL drain  WOUND: Dressing clean dry intact  MENTAL STATUS: AAO x3; Opens eyes spontaneously, Appropriately conversant, following simple commands  CRANIAL NERVES: PERRL. EOMI without nystagmus. Face symmetric w/ normal eye closure and smile, tongue midline. Hearing grossly intact. Speech clear. Head turning and shoulder shrug intact.   REFLEXES: No pronator drift  MOTOR: strength 4/5 b/l UEs 2/2 pain; B/L LEs HF/KE 4+/5, DF/PF 5/5  SENSATION: grossly intact to light touch all extremities  SKIN: Warm, dry; no rashes or lesions    LABS:                        10.9   9.89  )-----------( 194      ( 13 Sep 2023 07:15 )             32.2     09-13    137  |  99  |  11.8  ----------------------------<  122<H>  4.1   |  28.0  |  0.71    Ca    9.2      13 Sep 2023 07:15  Phos  3.9     09-12  Mg     1.5     09-12        Urinalysis Basic - ( 13 Sep 2023 07:15 )    Color: x / Appearance: x / SG: x / pH: x  Gluc: 122 mg/dL / Ketone: x  / Bili: x / Urobili: x   Blood: x / Protein: x / Nitrite: x   Leuk Esterase: x / RBC: x / WBC x   Sq Epi: x / Non Sq Epi: x / Bacteria: x        09-12 @ 07:01 - 09-13 @ 07:00  --------------------------------------------------------  IN: 50 mL / OUT: 775 mL / NET: -725 mL    09-13 @ 07:01 - 09-13 @ 16:24  --------------------------------------------------------  IN: 360 mL / OUT: 425 mL / NET: -65 mL      IMAGING:   CT Lumbar Spine No Cont (09.12.23 @ 16:21)     IMPRESSION: Post L5 and S1 laminectomies there are transpedicular screws   and connecting rods L5-S1 graft at L5-S1 since 8/29/2023. Drain is   present. Postoperative changes are identified which are nonspecific.      
HPI:  77M with PMH DM II, HTN, HLD, cardiac murmur, carotid stenosis, CAD presents to PST today. Pt. reports pain to lower back his entire life and completed 2 lower back surgeries 2 years ago. Described pain as sharp with  radiation to LLE. Reports pain for more than 5 years. Completed cortisone injections previously with the last one 2022 with short term relief. Reports cortisone injections have greatly elevated his blood glucose level.  Pt. reports pain is constant. Standing have been exacerbating pain. Sitting alleviate pain. Pain levels include a current pain level of 8/10, a minimum pain level of 5/10 and a maximum pain level of 9/10. Reports pain is affecting his ADL's. Difficulty with putting on his pants. Scheduled for L5-S1 decompression and interbody fusion on 9/11/2023.       INTERVAL HPI/OVERNIGHT EVENTS:  Pt is now POD #3 s/p L5-S1 decompression and interbody fusion. Patient seen sitting comfortably in chair. Reports back pain controlled with pain medication. GIL drain removed yesterday. Pt cleared by PT for home with rolling walker    Vital Signs Last 24 Hrs  T(C): 37 (14 Sep 2023 08:52), Max: 37 (14 Sep 2023 08:52)  T(F): 98.6 (14 Sep 2023 08:52), Max: 98.6 (14 Sep 2023 08:52)  HR: 69 (14 Sep 2023 08:52) (68 - 82)  BP: 114/65 (14 Sep 2023 08:52) (111/66 - 158/74)  BP(mean): --  RR: 18 (14 Sep 2023 08:52) (18 - 18)  SpO2: 95% (14 Sep 2023 08:52) (94% - 98%)    Parameters below as of 14 Sep 2023 08:52  Patient On (Oxygen Delivery Method): room air      PHYSICAL EXAM:  GENERAL: NAD, well-groomed  HEAD:  Atraumatic, normocephalic  WOUND: Dressing clean dry intact  MENTAL STATUS: AAO x3; Awake; Opens eyes spontaneously; Appropriately conversant, following simple commands  CRANIAL NERVES: PERRL. EOMI without nystagmus. Face symmetric w/ normal eye closure and smile, tongue midline. Hearing grossly intact. Speech clear. Head turning and shoulder shrug intact.   REFLEXES: No pronator drift  MOTOR: strength 5/5 b/l UEs; B/L LEs HF/KE 4+/5, DF/PF 5/5  SENSATION: grossly intact to light touch all extremities  SKIN: Warm, dry; no rashes or lesions    LABS:                        10.9   9.89  )-----------( 194      ( 13 Sep 2023 07:15 )             32.2     09-13    137  |  99  |  11.8  ----------------------------<  122<H>  4.1   |  28.0  |  0.71    Ca    9.2      13 Sep 2023 07:15        Urinalysis Basic - ( 13 Sep 2023 07:15 )    Color: x / Appearance: x / SG: x / pH: x  Gluc: 122 mg/dL / Ketone: x  / Bili: x / Urobili: x   Blood: x / Protein: x / Nitrite: x   Leuk Esterase: x / RBC: x / WBC x   Sq Epi: x / Non Sq Epi: x / Bacteria: x        09-13 @ 07:01  -  09-14 @ 07:00  --------------------------------------------------------  IN: 360 mL / OUT: 1140 mL / NET: -780 mL        RADIOLOGY & ADDITIONAL TESTS:

## 2023-09-14 NOTE — DIETITIAN INITIAL EVALUATION ADULT - PERTINENT LABORATORY DATA
09-13    137  |  99  |  11.8  ----------------------------<  122<H>  4.1   |  28.0  |  0.71    Ca    9.2      13 Sep 2023 07:15    POCT Blood Glucose.: 155 mg/dL (09-14-23 @ 07:58)  A1C with Estimated Average Glucose Result: 6.1 % (09-07-23 @ 10:20)

## 2023-09-14 NOTE — DISCHARGE NOTE PROVIDER - NSDCMRMEDTOKEN_GEN_ALL_CORE_FT
acetaminophen 325 mg oral tablet: 2 tab(s) orally every 6 hours  atenolol 25 mg oral tablet: 1 orally once a day  fish oil daily:   losartan 100 mg oral tablet: 1 orally once a day  metFORMIN 500 mg oral tablet: 1 orally 2 times a day  Multiple Vitamins oral tablet: 1 orally once a day  probiotic daily:   rosuvastatin 10 mg oral capsule: 1 orally once a day   acetaminophen 325 mg oral tablet: 2 tab(s) orally every 6 hours  atenolol 25 mg oral tablet: 1 orally once a day  fish oil daily:   losartan 100 mg oral tablet: 1 orally once a day  metFORMIN 500 mg oral tablet: 1 orally 2 times a day  methocarbamol 750 mg oral tablet: 1 tab(s) orally every 8 hours as needed for Muscle Spasm Take 1 tab by mouth every 8 hours as needed for muscle spasm MDD: 3 tabs  Multiple Vitamins oral tablet: 1 orally once a day  oxyCODONE 5 mg oral tablet: 2 tab(s) orally every 4 hours as needed for  severe pain Take 1-2 tabs by mouth every 4 hours as needed for severe pain MDD: 12 tabs  probiotic daily:   rosuvastatin 10 mg oral capsule: 1 orally once a day  senna leaf extract oral tablet: 2 tab(s) orally once a day (at bedtime) MDD: 2 tabs   acetaminophen 325 mg oral tablet: 2 tab(s) orally every 6 hours  atenolol 25 mg oral tablet: 1 orally once a day  fish oil daily:   gabapentin 100 mg oral capsule: 1 cap(s) orally once a day (at bedtime) MDD: 1 cap  losartan 100 mg oral tablet: 1 orally once a day  metFORMIN 500 mg oral tablet: 1 orally 2 times a day  methocarbamol 750 mg oral tablet: 1 tab(s) orally every 8 hours as needed for Muscle Spasm Take 1 tab by mouth every 8 hours as needed for muscle spasm MDD: 3 tabs  Multiple Vitamins oral tablet: 1 tab(s) orally once a day  oxyCODONE 5 mg oral tablet: 2 tab(s) orally every 4 hours as needed for  severe pain Take 1-2 tabs by mouth every 4 hours as needed for severe pain MDD: 12 tabs  probiotic daily:   Rolling walker: Use as directed  rosuvastatin 10 mg oral capsule: 1 orally once a day  senna leaf extract oral tablet: 2 tab(s) orally once a day (at bedtime) MDD: 2 tabs

## 2023-09-14 NOTE — DISCHARGE NOTE PROVIDER - NSDCFUADDINST_GEN_ALL_CORE_FT
Diet: You should continue a low carb diet. Ensure a well balanced and proper diet to help with proper wound healing. It is imperative for adequate water intake, dietary fiber intake, and ambulation as tolerated to avoid constipation.    Pain: It is common to have a headache or incisional pain after surgery. You may take the pain medication we prescribe, or over the counter Tylenol. Pain medication, especially narcotics, can cause constipation. Use stool softeners or mild laxative as needed.     Medication: You have been started on a new medication called Robaxin. This medication can make you drowsy, do not drive or operate heavy machinery.     Activity: Avoid straining, heavy lifting (objects greater than 10 pounds), strenuous activity, twisting, bending, and vigorous exercise. You should not drive or operate machinery until cleared by your neurosurgeon.     Wound: Monitor your incision for drainage, redness, swelling. Call our office if you have any concerns. You may shower upon arriving home. While washing, do not rub, scratch, or scrub your incision. You may wash your incision with mild shampoo/soap. Keep the incision open to air. You do not need a dressing to cover incision.     Appointment(s): Follow up with Dr. Pollard in our office outpatient in 2 weeks. Call [phone] to schedule an appointment. Sutures/staples will be removed at follow up appointment. It is also important to see your primary physician to keep them up to date regarding your recent admission and for a formal outpatient comprehensive medical review. Call their offices for an appointment as soon as you leave the hospital. If you do not have a primary physician, you may contact the [facility] [place] [phone] located on [address] [place] [address].    Should you develop any new or worsening neurologic symptoms or have concern about your incision, please call our office immediately or visit the emergency department.    Return to ER immediately for any of the following: fever, bleeding, new onset numbness/tingling/weakness, nausea and/or vomiting, chest pain, shortness of breath, confusion, seizure, altered mental status, urinary and/or fecal incontinence or retention.  Do not take your aspirin for 1 week. Can restart on 9/21/23.     Diet: You should continue a low carb diet. Ensure a well balanced and proper diet to help with proper wound healing. It is imperative for adequate water intake, dietary fiber intake, and ambulation as tolerated to avoid constipation.    Pain: It is common to have a headache or incisional pain after surgery. You may take the pain medication we prescribe, or over the counter Tylenol. Pain medication, especially narcotics, can cause constipation. Use stool softeners or mild laxative as needed.     Medication: You have been started on a new medication called Robaxin. This medication can make you drowsy, do not drive or operate heavy machinery.     Activity: Avoid straining, heavy lifting (objects greater than 10 pounds), strenuous activity, twisting, bending, and vigorous exercise. You should not drive or operate machinery until cleared by your neurosurgeon.     Wound: Monitor your incision for drainage, redness, swelling. Call our office if you have any concerns. You may shower upon arriving home. While washing, do not rub, scratch, or scrub your incision. You may wash your incision with mild shampoo/soap. Keep the incision open to air. You do not need a dressing to cover incision.     Appointment(s): Follow up with Dr. Pollard in our office outpatient in 2 weeks. Call [phone] to schedule an appointment. Sutures/staples will be removed at follow up appointment. It is also important to see your primary physician to keep them up to date regarding your recent admission and for a formal outpatient comprehensive medical review. Call their offices for an appointment as soon as you leave the hospital. If you do not have a primary physician, you may contact the [facility] [place] [phone] located on [address] [place] [address], [place] [address].    Should you develop any new or worsening neurologic symptoms or have concern about your incision, please call our office immediately or visit the emergency department.    Return to ER immediately for any of the following: fever, bleeding, new onset numbness/tingling/weakness, nausea and/or vomiting, chest pain, shortness of breath, confusion, seizure, altered mental status, urinary and/or fecal incontinence or retention.

## 2023-09-14 NOTE — DIETITIAN INITIAL EVALUATION ADULT - NSICDXPASTMEDICALHX_GEN_ALL_CORE_FT
PAST MEDICAL HISTORY:  CAD (coronary artery disease)     H/O cardiac murmur     H/O carotid stenosis     HLD (hyperlipidemia)     HTN (hypertension)     Type 2 diabetes mellitus

## 2023-09-14 NOTE — DIETITIAN INITIAL EVALUATION ADULT - ORAL INTAKE PTA/DIET HISTORY
Multiple attempts to see patient however only wife remained in room 2/2 on a long walk. As per wife, pt 128lb 2/23 (question accuracy), 2/2 pre surgery/pain, now 167lb, 175 (9/22) per [facility] chart. Pt follows no diet restrictions PTA, provided verbal/written low sodium/DASH instruction. Tray at bedside %. Multiple attempts to see patient however only wife remained in room 2/2 on a long walk. As per wife, pt 128lb 2/23 (question accuracy), 2/2 pre surgery/pain, decreased po intake now improving. Currently weight is 167lb, 175 (9/22) per [facility] chart. Pt follows no diet restrictions PTA, provided verbal/written low sodium/DASH instruction. Tray at bedside %.

## 2023-09-14 NOTE — DIETITIAN INITIAL EVALUATION ADULT - OTHER INFO
65M with PMH DM II, HTN, HLD, cardiac murmur, carotid stenosis, CAD presents to PST today. Pt. reports pain to lower back his entire life and completed 2 lower back surgeries 2 years ago. Described pain as sharp with  radiation to LLE. Reports pain for more than 5 years. Completed cortisone injections previously with the last one 2022 with short term relief. Reports cortisone injections have greatly elevated his blood glucose level.  Pt. reports pain is constant. Standing have been exacerbating pain. Sitting alleviate pain. Pain levels include a current pain level of 8/10, a minimum pain level of 5/10 and a maximum pain level of 9/10. Reports pain is affecting his ADL's. Difficulty with putting on his pants. Scheduled for L5-S1 decompression and interbody fusion on 9/11/2023.

## 2023-09-15 ENCOUNTER — INPATIENT (INPATIENT)
Facility: HOSPITAL | Age: 78
LOS: 2 days | Discharge: HOME | DRG: 394 | End: 2023-09-18
Attending: STUDENT IN AN ORGANIZED HEALTH CARE EDUCATION/TRAINING PROGRAM | Admitting: STUDENT IN AN ORGANIZED HEALTH CARE EDUCATION/TRAINING PROGRAM
Payer: MEDICARE

## 2023-09-15 VITALS
RESPIRATION RATE: 16 BRPM | DIASTOLIC BLOOD PRESSURE: 70 MMHG | TEMPERATURE: 99 F | SYSTOLIC BLOOD PRESSURE: 128 MMHG | OXYGEN SATURATION: 97 % | WEIGHT: 167.99 LBS | HEART RATE: 121 BPM | HEIGHT: 71 IN

## 2023-09-15 DIAGNOSIS — Z98.49 CATARACT EXTRACTION STATUS, UNSPECIFIED EYE: Chronic | ICD-10-CM

## 2023-09-15 DIAGNOSIS — Z98.890 OTHER SPECIFIED POSTPROCEDURAL STATES: Chronic | ICD-10-CM

## 2023-09-15 LAB
ALBUMIN SERPL ELPH-MCNC: 4.2 G/DL — SIGNIFICANT CHANGE UP (ref 3.3–5.2)
ALP SERPL-CCNC: 58 U/L — SIGNIFICANT CHANGE UP (ref 40–120)
ALT FLD-CCNC: 22 U/L — SIGNIFICANT CHANGE UP
ANION GAP SERPL CALC-SCNC: 17 MMOL/L — SIGNIFICANT CHANGE UP (ref 5–17)
APPEARANCE UR: CLEAR — SIGNIFICANT CHANGE UP
APTT BLD: 27.5 SEC — SIGNIFICANT CHANGE UP (ref 24.5–35.6)
AST SERPL-CCNC: 32 U/L — SIGNIFICANT CHANGE UP
BASE EXCESS BLDV CALC-SCNC: 13.5 MMOL/L — HIGH (ref -2–3)
BASOPHILS # BLD AUTO: 0.02 K/UL — SIGNIFICANT CHANGE UP (ref 0–0.2)
BASOPHILS NFR BLD AUTO: 0.2 % — SIGNIFICANT CHANGE UP (ref 0–2)
BILIRUB SERPL-MCNC: 0.7 MG/DL — SIGNIFICANT CHANGE UP (ref 0.4–2)
BILIRUB UR-MCNC: NEGATIVE — SIGNIFICANT CHANGE UP
BLD GP AB SCN SERPL QL: SIGNIFICANT CHANGE UP
BUN SERPL-MCNC: 18.2 MG/DL — SIGNIFICANT CHANGE UP (ref 8–20)
CA-I SERPL-SCNC: 1.2 MMOL/L — SIGNIFICANT CHANGE UP (ref 1.15–1.33)
CALCIUM SERPL-MCNC: 10.2 MG/DL — SIGNIFICANT CHANGE UP (ref 8.4–10.5)
CHLORIDE BLDV-SCNC: 95 MMOL/L — LOW (ref 96–108)
CHLORIDE SERPL-SCNC: 90 MMOL/L — LOW (ref 96–108)
CO2 SERPL-SCNC: 30 MMOL/L — HIGH (ref 22–29)
COLOR SPEC: YELLOW — SIGNIFICANT CHANGE UP
CREAT SERPL-MCNC: 0.83 MG/DL — SIGNIFICANT CHANGE UP (ref 0.5–1.3)
CRP SERPL-MCNC: 35 MG/L — HIGH
DIFF PNL FLD: ABNORMAL
EGFR: 91 ML/MIN/1.73M2 — SIGNIFICANT CHANGE UP
EOSINOPHIL # BLD AUTO: 0.04 K/UL — SIGNIFICANT CHANGE UP (ref 0–0.5)
EOSINOPHIL NFR BLD AUTO: 0.3 % — SIGNIFICANT CHANGE UP (ref 0–6)
EPI CELLS # UR: SIGNIFICANT CHANGE UP
ERYTHROCYTE [SEDIMENTATION RATE] IN BLOOD: 72 MM/HR — HIGH (ref 0–15)
GAS PNL BLDV: 132 MMOL/L — LOW (ref 136–145)
GAS PNL BLDV: SIGNIFICANT CHANGE UP
GLUCOSE BLDV-MCNC: 188 MG/DL — HIGH (ref 70–99)
GLUCOSE SERPL-MCNC: 193 MG/DL — HIGH (ref 70–99)
GLUCOSE UR QL: 100 MG/DL
HCO3 BLDV-SCNC: 38 MMOL/L — HIGH (ref 22–29)
HCT VFR BLD CALC: 33.5 % — LOW (ref 39–50)
HCT VFR BLDA CALC: 36 % — SIGNIFICANT CHANGE UP
HGB BLD CALC-MCNC: 12.1 G/DL — LOW (ref 12.6–17.4)
HGB BLD-MCNC: 11.7 G/DL — LOW (ref 13–17)
IMM GRANULOCYTES NFR BLD AUTO: 0.4 % — SIGNIFICANT CHANGE UP (ref 0–0.9)
INR BLD: 0.97 RATIO — SIGNIFICANT CHANGE UP (ref 0.85–1.18)
KETONES UR-MCNC: ABNORMAL
LACTATE BLDV-MCNC: 1.6 MMOL/L — SIGNIFICANT CHANGE UP (ref 0.5–2)
LEUKOCYTE ESTERASE UR-ACNC: ABNORMAL
LIDOCAIN IGE QN: 51 U/L — SIGNIFICANT CHANGE UP (ref 22–51)
LYMPHOCYTES # BLD AUTO: 1.46 K/UL — SIGNIFICANT CHANGE UP (ref 1–3.3)
LYMPHOCYTES # BLD AUTO: 11.8 % — LOW (ref 13–44)
MCHC RBC-ENTMCNC: 30.7 PG — SIGNIFICANT CHANGE UP (ref 27–34)
MCHC RBC-ENTMCNC: 34.9 GM/DL — SIGNIFICANT CHANGE UP (ref 32–36)
MCV RBC AUTO: 87.9 FL — SIGNIFICANT CHANGE UP (ref 80–100)
MONOCYTES # BLD AUTO: 0.94 K/UL — HIGH (ref 0–0.9)
MONOCYTES NFR BLD AUTO: 7.6 % — SIGNIFICANT CHANGE UP (ref 2–14)
NEUTROPHILS # BLD AUTO: 9.83 K/UL — HIGH (ref 1.8–7.4)
NEUTROPHILS NFR BLD AUTO: 79.7 % — HIGH (ref 43–77)
NITRITE UR-MCNC: NEGATIVE — SIGNIFICANT CHANGE UP
NT-PROBNP SERPL-SCNC: 72 PG/ML — SIGNIFICANT CHANGE UP (ref 0–300)
PCO2 BLDV: 54 MMHG — SIGNIFICANT CHANGE UP (ref 42–55)
PH BLDV: 7.45 — HIGH (ref 7.32–7.43)
PH UR: 8 — SIGNIFICANT CHANGE UP (ref 5–8)
PLATELET # BLD AUTO: 302 K/UL — SIGNIFICANT CHANGE UP (ref 150–400)
PO2 BLDV: 49 MMHG — HIGH (ref 25–45)
POTASSIUM BLDV-SCNC: 4.4 MMOL/L — SIGNIFICANT CHANGE UP (ref 3.5–5.1)
POTASSIUM SERPL-MCNC: 4.3 MMOL/L — SIGNIFICANT CHANGE UP (ref 3.5–5.3)
POTASSIUM SERPL-SCNC: 4.3 MMOL/L — SIGNIFICANT CHANGE UP (ref 3.5–5.3)
PROT SERPL-MCNC: 6.9 G/DL — SIGNIFICANT CHANGE UP (ref 6.6–8.7)
PROT UR-MCNC: 30 MG/DL
PROTHROM AB SERPL-ACNC: 10.8 SEC — SIGNIFICANT CHANGE UP (ref 9.5–13)
RBC # BLD: 3.81 M/UL — LOW (ref 4.2–5.8)
RBC # FLD: 12.3 % — SIGNIFICANT CHANGE UP (ref 10.3–14.5)
RBC CASTS # UR COMP ASSIST: SIGNIFICANT CHANGE UP /HPF (ref 0–4)
SAO2 % BLDV: 80 % — SIGNIFICANT CHANGE UP
SODIUM SERPL-SCNC: 137 MMOL/L — SIGNIFICANT CHANGE UP (ref 135–145)
SP GR SPEC: 1.01 — SIGNIFICANT CHANGE UP (ref 1.01–1.02)
TROPONIN T SERPL-MCNC: <0.01 NG/ML — SIGNIFICANT CHANGE UP (ref 0–0.06)
UROBILINOGEN FLD QL: NEGATIVE MG/DL — SIGNIFICANT CHANGE UP
WBC # BLD: 12.34 K/UL — HIGH (ref 3.8–10.5)
WBC # FLD AUTO: 12.34 K/UL — HIGH (ref 3.8–10.5)
WBC UR QL: SIGNIFICANT CHANGE UP /HPF (ref 0–5)

## 2023-09-15 PROCEDURE — 99285 EMERGENCY DEPT VISIT HI MDM: CPT | Mod: GC

## 2023-09-15 PROCEDURE — 71045 X-RAY EXAM CHEST 1 VIEW: CPT | Mod: 26

## 2023-09-15 PROCEDURE — 74177 CT ABD & PELVIS W/CONTRAST: CPT | Mod: 26,ME

## 2023-09-15 PROCEDURE — G1004: CPT

## 2023-09-15 PROCEDURE — 72131 CT LUMBAR SPINE W/O DYE: CPT | Mod: 26,ME

## 2023-09-15 PROCEDURE — 71250 CT THORAX DX C-: CPT | Mod: 26,MA

## 2023-09-15 PROCEDURE — 93010 ELECTROCARDIOGRAM REPORT: CPT

## 2023-09-15 RX ORDER — ACETAMINOPHEN 500 MG
1000 TABLET ORAL ONCE
Refills: 0 | Status: COMPLETED | OUTPATIENT
Start: 2023-09-15 | End: 2023-09-15

## 2023-09-15 RX ORDER — MORPHINE SULFATE 50 MG/1
4 CAPSULE, EXTENDED RELEASE ORAL ONCE
Refills: 0 | Status: DISCONTINUED | OUTPATIENT
Start: 2023-09-15 | End: 2023-09-15

## 2023-09-15 RX ORDER — ONDANSETRON 8 MG/1
4 TABLET, FILM COATED ORAL ONCE
Refills: 0 | Status: COMPLETED | OUTPATIENT
Start: 2023-09-15 | End: 2023-09-15

## 2023-09-15 RX ORDER — FAMOTIDINE 10 MG/ML
20 INJECTION INTRAVENOUS ONCE
Refills: 0 | Status: COMPLETED | OUTPATIENT
Start: 2023-09-15 | End: 2023-09-15

## 2023-09-15 RX ORDER — SODIUM CHLORIDE 9 MG/ML
1000 INJECTION INTRAMUSCULAR; INTRAVENOUS; SUBCUTANEOUS ONCE
Refills: 0 | Status: COMPLETED | OUTPATIENT
Start: 2023-09-15 | End: 2023-09-15

## 2023-09-15 RX ORDER — IOHEXOL 300 MG/ML
30 INJECTION, SOLUTION INTRAVENOUS ONCE
Refills: 0 | Status: COMPLETED | OUTPATIENT
Start: 2023-09-15 | End: 2023-09-15

## 2023-09-15 RX ORDER — FENTANYL CITRATE 50 UG/ML
25 INJECTION INTRAVENOUS ONCE
Refills: 0 | Status: DISCONTINUED | OUTPATIENT
Start: 2023-09-15 | End: 2023-09-15

## 2023-09-15 RX ADMIN — MORPHINE SULFATE 4 MILLIGRAM(S): 50 CAPSULE, EXTENDED RELEASE ORAL at 20:34

## 2023-09-15 RX ADMIN — Medication 400 MILLIGRAM(S): at 20:53

## 2023-09-15 RX ADMIN — FAMOTIDINE 20 MILLIGRAM(S): 10 INJECTION INTRAVENOUS at 20:37

## 2023-09-15 RX ADMIN — SODIUM CHLORIDE 1000 MILLILITER(S): 9 INJECTION INTRAMUSCULAR; INTRAVENOUS; SUBCUTANEOUS at 20:37

## 2023-09-15 RX ADMIN — ONDANSETRON 4 MILLIGRAM(S): 8 TABLET, FILM COATED ORAL at 20:34

## 2023-09-15 RX ADMIN — ONDANSETRON 4 MILLIGRAM(S): 8 TABLET, FILM COATED ORAL at 23:38

## 2023-09-15 RX ADMIN — IOHEXOL 30 MILLILITER(S): 300 INJECTION, SOLUTION INTRAVENOUS at 20:56

## 2023-09-15 NOTE — CONSULT NOTE ADULT - ASSESSMENT
77M known to neurosurgery service s/p L5-S1 PLIF 9/11/23 w/ Dr. Pollard, DMII, HTN, HLD, cardiac murmur, carotid stenosis, discharged 9/14 presents to ED c/o N/V/abd pain localized to LLQ.      Plan  - Workup per ED r/o abdominal pathology   - Pending imaging  - Most likely opioid induced constipation; may benefit from enema or fecal disimpaction pending imaging  - Will follow  - No acute neurosurgical intervention at this time  - D/w Dr. Chen

## 2023-09-15 NOTE — CONSULT NOTE ADULT - SUBJECTIVE AND OBJECTIVE BOX
Patient is a 77 year old Male who presents with a chief complaint of abd pain    HISTORY OF PRESENT ILLNESS:   77M known to neurosurgery service s/p L5-S1 PLIF 23 w/ Dr. Pollard, DMII, HTN, HLD, cardiac murmur, carotid stenosis, discharged  presents to ED c/o N/V/abd pain localized to LLQ. Patient states he has been taking pain medications and did not start stool softeners until tonight, which he threw up. Had large BM documented while admitted . Had small BM this AM per patient. Patient denies any new motor/sensation symptoms, exam stable from discharge. Incision healing well, soft, nontender, staples in place w/ no dehiscence/drainage/collection.      PAST MEDICAL & SURGICAL HISTORY:  CAD (coronary artery disease)  HTN (hypertension)  HLD (hyperlipidemia)  H/O carotid stenosis  H/O cardiac murmur  Type 2 diabetes mellitus  Previous back surgery  S/P cataract surgery    FAMILY HISTORY:  FHx: heart disease (Father, Mother)  FH: diabetes mellitus (Father, Mother, Sibling)    Allergies  No Known Allergies    REVIEW OF SYSTEMS  Negative except as noted in HPI    HOME MEDICATIONS:  acetaminophen 325 mg oral tablet: 2 tab(s) orally every 6 hours (14 Sep 2023 11:53)  atenolol 25 mg oral tablet: 1 orally once a day (11 Sep 2023 10:06)  fish oil daily:  (11 Sep 2023 10:06)  losartan 100 mg oral tablet: 1 orally once a day (11 Sep 2023 10:06)  metFORMIN 500 mg oral tablet: 1 orally 2 times a day (11 Sep 2023 10:06)  Multiple Vitamins oral tablet: 1 tab(s) orally once a day (14 Sep 2023 16:19)  probiotic daily:  (11 Sep 2023 10:06)  rosuvastatin 10 mg oral capsule: 1 orally once a day (11 Sep 2023 10:06)    MEDICATIONS:  Antibiotics:  Neuro:  Anticoagulation:  OTHER:  IVF:    Vital Signs Last 24 Hrs  T(C): 38.7 (15 Sep 2023 20:39), Max: 38.7 (15 Sep 2023 20:39)  T(F): 101.6 (15 Sep 2023 20:39), Max: 101.6 (15 Sep 2023 20:39)  HR: 121 (15 Sep 2023 19:14) (121 - 121)  BP: 128/70 (15 Sep 2023 19:14) (128/70 - 128/70)  BP(mean): --  RR: 16 (15 Sep 2023 19:14) (16 - 16)  SpO2: 97% (15 Sep 2023 19:14) (97% - 97%)  Parameters below as of 15 Sep 2023 19:14  Patient On (Oxygen Delivery Method): room air    PHYSICAL EXAM:  GENERAL: NAD, well-groomed, well-developed  HEAD:  Atraumatic, normocephalic  CRIS COMA SCORE: E-4 V-5 M-6 = 15  MENTAL STATUS: AAO x3; Awake; Opens eyes spontaneously; Appropriately conversant without aphasia; following simple commands  CRANIAL NERVES: EOMI without nystagmus. Facial sensation intact V1-3 distribution b/l. Face symmetric w/ normal eye closure and smile, tongue midline. Hearing grossly intact. Speech clear. Head turning intact  MOTOR: strength 5/5 b/l UEs; B/l LEs HF/KE 4/5 pain limited, DF/PF 5/5.   SENSATION: grossly intact to light touch all extremities; continued LLE dysesthesias   CHEST/LUNG: Nonlabored breaths  ABDOMEN: Distended, LLQ tenderness to palpation  SPINE: Lumbar incision healing well w/ no redness, dehiscences drainage. Staples in place.   SKIN: Warm, dry    LABS:             11.7   12.34 )-----------( 302      ( 15 Sep 2023 19:24 )             33.5     09-15    137  |  90<L>  |  18.2  ----------------------------<  193<H>  4.3   |  30.0<H>  |  0.83    Ca    10.2      15 Sep 2023 19:24    TPro  6.9  /  Alb  4.2  /  TBili  0.7  /  DBili  x   /  AST  32  /  ALT  22  /  AlkPhos  58  09-15    PT/INR - ( 15 Sep 2023 20:46 )   PT: 10.8 sec;   INR: 0.97 ratio      PTT - ( 15 Sep 2023 20:46 )  PTT:27.5 sec  Urinalysis Basic - ( 15 Sep 2023 20:11 )    Color: Yellow / Appearance: Clear / S.010 / pH: x  Gluc: x / Ketone: Large  / Bili: Negative / Urobili: Negative mg/dL   Blood: x / Protein: 30 mg/dL / Nitrite: Negative   Leuk Esterase: Trace / RBC: x / WBC x   Sq Epi: x / Non Sq Epi: x / Bacteria: x    CULTURES:        RADIOLOGY & ADDITIONAL STUDIES:  No new imaging to be reviewed

## 2023-09-15 NOTE — ED PROVIDER NOTE - CLINICAL SUMMARY MEDICAL DECISION MAKING FREE TEXT BOX
77-year-old male status post lumbar surgery, postop day 4, now presenting with abdominal pain with constipation, with fever of 101.6 rectally in the ED, with tachycardia.  We will do labs, CT ab pel, CT lumbar spine, neurosurgery consulted for evaluation of postoperative patient, antipyretic, antiemetic, reeval.

## 2023-09-15 NOTE — ED ADULT NURSE NOTE - OBJECTIVE STATEMENT
Pt received at 2030. Pt is A&O x 4, breathing even and unlabored. Pt c/o LLQ pain rating 8/10, aching that started at 12pm today. Pt states that he spinal sx on Monday and has been taking Oxy at home. Last dose taken at 0730.  Upon assessment, LLQ is soft and tender. Pt also c/o vomiting at home, still presently nauseous, holding emesis bag. Pt found to be febrile-rectal temp 101. BC drawn and sent, awaiting further POC.

## 2023-09-15 NOTE — CONSULT NOTE ADULT - NS ATTEND AMEND GEN_ALL_CORE FT
NSGY Attg:    see above    patient seen and examined  patient recent post op    agree with plan as above regarding ileus

## 2023-09-15 NOTE — ED ADULT TRIAGE NOTE - CHIEF COMPLAINT QUOTE
Pt BIBA from home c.o diffuse lower abdominal pain, N/V following a spinal fusion x 3 days ago. Pt has been taking Oxycodone at home for pain. Pt reports constipation with last BM this morning. Denies any chest pain, SOB at this time.

## 2023-09-15 NOTE — ED PROVIDER NOTE - ATTENDING CONTRIBUTION TO CARE
77-year-old male; with PMH significant for DM 2, HTN, HLD, CAD, carotid stenosis; now presenting with lower abdominal pain status post L5-S1 PLIF on 9/11/2023 with Dr. Pollard.  Patient reports he was discharged on oxycodone for pain control.  States he has been increasingly constipated with hard stools over the past 24 hours.  States his last bowel movement was this morning.  Last flatus few minutes ago.  Complaining of abdominal pain bilateral lower abdomen but worse on the left lower quadrant.  Complaining of nausea and vomiting x5 episodes–brown in color.  Complaining of generalized malaise and fatigue over the past 12 hours with decreased energy level.  Complaining of chills.  Denies chest pain or shortness of breath.  Complaining of mild cough for the past 3 to 4 days.  Patient was just discharged 24 hours ago and does report he had bowel movements prior to discharge.  Patient was on Colace prior to discharge but did not take it in the last 24 hours.   General:     NAD  Head:     NC/AT, EOMI, oral mucosa moist  Neck:     trachea midline  Lungs:     CTA b/l, no w/r/r  CVS:     S1S2, RRR, no m/g/r  Abd:     +BS, s/nd, no organomegaly. TTP @ LLQ, no rebound,  BACK:  incision c/d/i staples.  no surrounding erythema or drainage.  NV intact and motor intact b/l LE  Ext:    2+ radial and pedal pulses, no c/c/e  Neuro: grossly intact  A/P: 77-year-old male status post lumbar surgery, postop day 4, now presenting with abdominal pain with constipation, with fever of 101.6 rectally in the ED, with tachycardia.  We will do labs, CT ab pel, CT lumbar spine, neurosurgery consulted for evaluation of postoperative patient, antipyretic, antiemetic, reeval.

## 2023-09-15 NOTE — ED ADULT NURSE NOTE - NSFALLHARMRISKINTERV_ED_ALL_ED

## 2023-09-15 NOTE — ED PROVIDER NOTE - OBJECTIVE STATEMENT
77y male post-op day 4 from lumbar spinal fusion with PMHx of T2DM, HTN. HLD, CAD. carotid stenosis, cardiac murmur BIBA for b/l lower abdominal pain. Patient states lower abdominal pain is constant, rating it a 9/10 non-radiating. He states this abdominal pain only began after starting the prescribed oxycodone yesterday for post-op pain. Patient also admits to nausea and vomiting 5 times since this morning. Last emesis was in the waiting room of ER. He states emesis is non-bloody and brown. Patient also admits to constipation, stating his LBM was this morning and described it as "small, hard, non-bloody." He says he took Miralax to ease these symptoms however, states he "threw it right back up." He also admits to dizziness he describes as lightheadedness that began today. He denies fever but states he has been experiencing chills today. Patient's temp is currently 99.1F. He denies chest pain, shortness of breath, and cough at this time.

## 2023-09-15 NOTE — ED PROVIDER NOTE - CARE PLAN
Patient ID: Kleber is a 7 year old male.  MRN: [id number]  Chief Complaint   Patient presents with   • Rash     HISTORY OF PRESENT ILLNESS  8 YO M here for follow up for recent hives outbreak    Was seen in ED, given pepcid, benadryl and steroids  Now sx currently improved  No hx of allergies, no recent changes in lotions, detergents or soaps or diet changes  Does have hx of COVID infection 2021, was asymptomatic        Review of Systems   Constitutional: Negative for activity change, appetite change and fever.   HENT: Negative for congestion, rhinorrhea and sore throat.    Respiratory: Negative for cough and shortness of breath.    Cardiovascular: Negative for chest pain.   Musculoskeletal: Negative for myalgias.   Skin: Negative for rash.   Neurological: Negative for dizziness and headaches.   Psychiatric/Behavioral: Positive for behavioral problems and confusion.       Patient's medications and problem list were reviewed and updated as appropriate.    ALLERGIES  ALLERGIES:   Allergen Reactions   • Seasonal Other (See Comments)     Watery eyes, rhinorrhea       MEDICAL HISTORY  Past Medical History:   Diagnosis Date   • Chronic mucoid otitis media of both ears 3/18/2019   • Croup 10/12/2019   • Viral URI 3/18/2019     Patient Active Problem List   Diagnosis   • Chronic mucoid otitis media of both ears   • Encounter for routine child health examination without abnormal findings   • Gross motor delay   • Hives     SURGICAL HISTORY  Past Surgical History:   Procedure Laterality Date   • Circumcision, primary       FAMILY HISTORY  Family History   Problem Relation Age of Onset   • Patient is unaware of any medical problems Mother    • Patient is unaware of any medical problems Father    • Patient is unaware of any medical problems Sister    • Patient is unaware of any medical problems Brother      SOCIAL  Social History     Socioeconomic History   • Marital status: Single     Spouse name: Not on file   • Number of  children: Not on file   • Years of education: Not on file   • Highest education level: Not on file   Occupational History   • Not on file   Tobacco Use   • Smoking status: Passive Smoke Exposure - Never Smoker   • Smokeless tobacco: Never Used   Substance and Sexual Activity   • Alcohol use: Not on file   • Drug use: Not on file   • Sexual activity: Not on file   Other Topics Concern   • Not on file   Social History Narrative    Lives with parents      Social Determinants of Health     Financial Resource Strain: Not on file   Food Insecurity: Not on file   Transportation Needs: Not on file   Physical Activity: Not on file   Stress: Not on file   Social Connections: Not on file   Intimate Partner Violence: Not on file     CURRENT MEDICATIONS  Current Outpatient Medications   Medication Sig Dispense Refill   • acetaminophen (TYLENOL) 160 MG/5ML solution Take by mouth every 4 hours as needed for Fever.     • diphenhydrAMINE (BENADRYL) 12.5 MG/5ML liquid Take by mouth 4 times daily as needed for Allergies.     • EPINEPHrine (EPIPEN JR) 0.15 MG/0.3ML auto-injector Inject 0.3 mLs into the muscle 1 time as needed for Anaphylaxis. 1 each 0   • famotidine (PEPCID) 40 MG/5ML suspension Take 1.1 mLs by mouth in the morning and 1.1 mLs in the evening. Do all this for 3 days. 6.6 mL 0   • prednisoLONE (PRELONE) 15 MG/5ML Syrup 5 ml by mouth once a day x 4 days. Preferably after breakfast or lunch 20 mL 0     No current facility-administered medications for this visit.       OBJECTIVE  Vitals:    09/26/22 1406   BP: 89/51   BP Location: LUE - Left upper extremity   Patient Position: Sitting   Cuff Size: Pediatric   Pulse: 97   Resp: 24   Temp: 98.5 °F (36.9 °C)   TempSrc: Oral   SpO2: 100%   Weight: 18.6 kg (41 lb 1.9 oz)   Height: 3' 7.11\" (1.095 m)   PainSc:  0     Physical Exam  Vitals reviewed.   Cardiovascular:      Rate and Rhythm: Normal rate and regular rhythm.      Heart sounds: Normal heart sounds.   Pulmonary:       Breath sounds: Normal breath sounds.   Abdominal:      General: There is no distension.      Palpations: Abdomen is soft.      Tenderness: There is no abdominal tenderness.   Skin:     General: Skin is warm.      Findings: No erythema or rash.   Neurological:      Mental Status: He is alert and oriented for age.   Psychiatric:         Mood and Affect: Mood normal.          ASSESSMENT AND PLAN  Problem List Items Addressed This Visit        Skin    Hives - Primary     Likely 2/2 autoimmune response from COVID infection vs allergies  Referral given to [facility] immunology                    Health Maintenance Summary     COVID-19 Vaccine (1)  Overdue - never done    Annual Physical (ages 3-18) (Yearly)  Overdue since 11/21/2020    Influenza Vaccine (1 of 2)  Due since 9/1/2022    Meningococcal Vaccine (1 - 2-dose series)  Next due on 11/1/2027    DTaP/Tdap/Td Vaccine (6 - Tdap)  Next due on 11/1/2027    HPV Vaccine (1 - Male 2-dose series)  Next due on 11/1/2027    Rotavirus Vaccine   Completed    Hepatitis B Vaccine   Completed    Hepatitis A Vaccine   Completed    Pneumococcal Vaccine 0-64   Completed    IPV Vaccine   Completed    MMR Vaccine   Completed    Varicella Vaccine   Completed          Prior to discharge all questions were answered and patient expressed understanding of the plan of care.    Plan of care was discussed with attending physician, Dr. Petit    Schedule follow up: Return if symptoms worsen or fail to improve.      Lizette Delgado MD  PGY3, Family Medicine   Principal Discharge DX:	Bowel obstruction   1

## 2023-09-15 NOTE — ED PROVIDER NOTE - NS ED ROS FT
Gen: Admits to chills, denies fever, no change in activity level  ENT: No congestion, no rhinorrhea  Resp: No cough, no trouble breathing  Cardiovascular: No chest pain, no palpitation  Gastrointestinal: Admits to nausea and vomiting of non-bloody emesis, Admits to constipation.  :  No change in urine output; no dysuria, no hematuria  MS: No joint or muscle pain  Skin: No rashes  Neuro: Admits to dizziness/light-headedness. No headache; no abnormal movements  Remainder negative, except as per the HPI

## 2023-09-15 NOTE — ED PROVIDER NOTE - PHYSICAL EXAMINATION
Gen: well appearing, alert and interactive  Head: normocephalic, atraumatic  EENT: EOMI, PERRLA, moist mucous membranes, no scleral icterus  Lung: no increased work of breathing, clear to auscultation bilaterally, no wheezing, rales, rhonchi, speaking in full sentences  CV: regular rate, regular rhythm, normal s1/s2  Abd: Moderate tenderness to palpation of b/l lower quadrants worse on left; soft, non-distended, no rebound tenderness or guarding  MSK: No edema, no visible deformities, full range of motion in all 4 extremities  Neuro: Awake, alert, no focal neurologic deficits  Skin: Patient is warm to touch; No obvious rash, no jaundice  Psych: normal affect, normal speech

## 2023-09-16 DIAGNOSIS — K56.609 UNSPECIFIED INTESTINAL OBSTRUCTION, UNSPECIFIED AS TO PARTIAL VERSUS COMPLETE OBSTRUCTION: ICD-10-CM

## 2023-09-16 PROBLEM — H52.223 ASTIGMATISM, REGULAR; BOTH EYES: Status: ACTIVE | Noted: 2023-09-16

## 2023-09-16 LAB
ANION GAP SERPL CALC-SCNC: 9 MMOL/L — SIGNIFICANT CHANGE UP (ref 5–17)
ANISOCYTOSIS BLD QL: SLIGHT — SIGNIFICANT CHANGE UP
BASOPHILS # BLD AUTO: 0 K/UL — SIGNIFICANT CHANGE UP (ref 0–0.2)
BASOPHILS NFR BLD AUTO: 0 % — SIGNIFICANT CHANGE UP (ref 0–2)
BUN SERPL-MCNC: 19.2 MG/DL — SIGNIFICANT CHANGE UP (ref 8–20)
CALCIUM SERPL-MCNC: 8.7 MG/DL — SIGNIFICANT CHANGE UP (ref 8.4–10.5)
CHLORIDE SERPL-SCNC: 98 MMOL/L — SIGNIFICANT CHANGE UP (ref 96–108)
CO2 SERPL-SCNC: 32 MMOL/L — HIGH (ref 22–29)
CREAT SERPL-MCNC: 0.88 MG/DL — SIGNIFICANT CHANGE UP (ref 0.5–1.3)
EGFR: 90 ML/MIN/1.73M2 — SIGNIFICANT CHANGE UP
EOSINOPHIL # BLD AUTO: 0 K/UL — SIGNIFICANT CHANGE UP (ref 0–0.5)
EOSINOPHIL NFR BLD AUTO: 0 % — SIGNIFICANT CHANGE UP (ref 0–6)
GLUCOSE BLDC GLUCOMTR-MCNC: 117 MG/DL — HIGH (ref 70–99)
GLUCOSE BLDC GLUCOMTR-MCNC: 143 MG/DL — HIGH (ref 70–99)
GLUCOSE BLDC GLUCOMTR-MCNC: 144 MG/DL — HIGH (ref 70–99)
GLUCOSE BLDC GLUCOMTR-MCNC: 181 MG/DL — HIGH (ref 70–99)
GLUCOSE SERPL-MCNC: 120 MG/DL — HIGH (ref 70–99)
HCT VFR BLD CALC: 26.2 % — LOW (ref 39–50)
HGB BLD-MCNC: 8.9 G/DL — LOW (ref 13–17)
LYMPHOCYTES # BLD AUTO: 1.38 K/UL — SIGNIFICANT CHANGE UP (ref 1–3.3)
LYMPHOCYTES # BLD AUTO: 24.3 % — SIGNIFICANT CHANGE UP (ref 13–44)
MAGNESIUM SERPL-MCNC: 1.9 MG/DL — SIGNIFICANT CHANGE UP (ref 1.6–2.6)
MANUAL SMEAR VERIFICATION: SIGNIFICANT CHANGE UP
MCHC RBC-ENTMCNC: 30.7 PG — SIGNIFICANT CHANGE UP (ref 27–34)
MCHC RBC-ENTMCNC: 34 GM/DL — SIGNIFICANT CHANGE UP (ref 32–36)
MCV RBC AUTO: 90.3 FL — SIGNIFICANT CHANGE UP (ref 80–100)
MICROCYTES BLD QL: SLIGHT — SIGNIFICANT CHANGE UP
MONOCYTES # BLD AUTO: 0.4 K/UL — SIGNIFICANT CHANGE UP (ref 0–0.9)
MONOCYTES NFR BLD AUTO: 7 % — SIGNIFICANT CHANGE UP (ref 2–14)
NEUTROPHILS # BLD AUTO: 3.76 K/UL — SIGNIFICANT CHANGE UP (ref 1.8–7.4)
NEUTROPHILS NFR BLD AUTO: 66.1 % — SIGNIFICANT CHANGE UP (ref 43–77)
OVALOCYTES BLD QL SMEAR: SLIGHT — SIGNIFICANT CHANGE UP
PHOSPHATE SERPL-MCNC: 4.1 MG/DL — SIGNIFICANT CHANGE UP (ref 2.4–4.7)
PLAT MORPH BLD: NORMAL — SIGNIFICANT CHANGE UP
PLATELET # BLD AUTO: 202 K/UL — SIGNIFICANT CHANGE UP (ref 150–400)
POIKILOCYTOSIS BLD QL AUTO: SLIGHT — SIGNIFICANT CHANGE UP
POTASSIUM SERPL-MCNC: 4.3 MMOL/L — SIGNIFICANT CHANGE UP (ref 3.5–5.3)
POTASSIUM SERPL-SCNC: 4.3 MMOL/L — SIGNIFICANT CHANGE UP (ref 3.5–5.3)
RAPID RVP RESULT: SIGNIFICANT CHANGE UP
RBC # BLD: 2.9 M/UL — LOW (ref 4.2–5.8)
RBC # FLD: 12.4 % — SIGNIFICANT CHANGE UP (ref 10.3–14.5)
RBC BLD AUTO: ABNORMAL
SARS-COV-2 RNA SPEC QL NAA+PROBE: SIGNIFICANT CHANGE UP
SODIUM SERPL-SCNC: 139 MMOL/L — SIGNIFICANT CHANGE UP (ref 135–145)
VARIANT LYMPHS # BLD: 2.6 % — SIGNIFICANT CHANGE UP (ref 0–6)
WBC # BLD: 5.69 K/UL — SIGNIFICANT CHANGE UP (ref 3.8–10.5)
WBC # FLD AUTO: 5.69 K/UL — SIGNIFICANT CHANGE UP (ref 3.8–10.5)

## 2023-09-16 PROCEDURE — 71045 X-RAY EXAM CHEST 1 VIEW: CPT | Mod: 26

## 2023-09-16 PROCEDURE — 99222 1ST HOSP IP/OBS MODERATE 55: CPT | Mod: FS

## 2023-09-16 RX ORDER — SODIUM CHLORIDE 9 MG/ML
1000 INJECTION, SOLUTION INTRAVENOUS
Refills: 0 | Status: DISCONTINUED | OUTPATIENT
Start: 2023-09-16 | End: 2023-09-18

## 2023-09-16 RX ORDER — ATORVASTATIN CALCIUM 80 MG/1
20 TABLET, FILM COATED ORAL AT BEDTIME
Refills: 0 | Status: DISCONTINUED | OUTPATIENT
Start: 2023-09-16 | End: 2023-09-16

## 2023-09-16 RX ORDER — SODIUM CHLORIDE 9 MG/ML
1000 INJECTION, SOLUTION INTRAVENOUS
Refills: 0 | Status: DISCONTINUED | OUTPATIENT
Start: 2023-09-16 | End: 2023-09-17

## 2023-09-16 RX ORDER — KETOROLAC TROMETHAMINE 30 MG/ML
15 SYRINGE (ML) INJECTION EVERY 6 HOURS
Refills: 0 | Status: DISCONTINUED | OUTPATIENT
Start: 2023-09-16 | End: 2023-09-16

## 2023-09-16 RX ORDER — LANOLIN ALCOHOL/MO/W.PET/CERES
3 CREAM (GRAM) TOPICAL AT BEDTIME
Refills: 0 | Status: DISCONTINUED | OUTPATIENT
Start: 2023-09-16 | End: 2023-09-18

## 2023-09-16 RX ORDER — METOPROLOL TARTRATE 50 MG
5 TABLET ORAL EVERY 6 HOURS
Refills: 0 | Status: DISCONTINUED | OUTPATIENT
Start: 2023-09-16 | End: 2023-09-18

## 2023-09-16 RX ORDER — METOCLOPRAMIDE HCL 10 MG
10 TABLET ORAL ONCE
Refills: 0 | Status: COMPLETED | OUTPATIENT
Start: 2023-09-16 | End: 2023-09-16

## 2023-09-16 RX ORDER — ACETAMINOPHEN 500 MG
1000 TABLET ORAL EVERY 6 HOURS
Refills: 0 | Status: COMPLETED | OUTPATIENT
Start: 2023-09-16 | End: 2023-09-16

## 2023-09-16 RX ORDER — DEXTROSE 50 % IN WATER 50 %
12.5 SYRINGE (ML) INTRAVENOUS ONCE
Refills: 0 | Status: DISCONTINUED | OUTPATIENT
Start: 2023-09-16 | End: 2023-09-18

## 2023-09-16 RX ORDER — ONDANSETRON 8 MG/1
4 TABLET, FILM COATED ORAL EVERY 6 HOURS
Refills: 0 | Status: DISCONTINUED | OUTPATIENT
Start: 2023-09-16 | End: 2023-09-18

## 2023-09-16 RX ORDER — DEXTROSE 50 % IN WATER 50 %
15 SYRINGE (ML) INTRAVENOUS ONCE
Refills: 0 | Status: DISCONTINUED | OUTPATIENT
Start: 2023-09-16 | End: 2023-09-18

## 2023-09-16 RX ORDER — ATENOLOL 25 MG/1
25 TABLET ORAL DAILY
Refills: 0 | Status: DISCONTINUED | OUTPATIENT
Start: 2023-09-16 | End: 2023-09-16

## 2023-09-16 RX ORDER — DEXTROSE 50 % IN WATER 50 %
25 SYRINGE (ML) INTRAVENOUS ONCE
Refills: 0 | Status: DISCONTINUED | OUTPATIENT
Start: 2023-09-16 | End: 2023-09-18

## 2023-09-16 RX ORDER — PANTOPRAZOLE SODIUM 20 MG/1
20 TABLET, DELAYED RELEASE ORAL DAILY
Refills: 0 | Status: DISCONTINUED | OUTPATIENT
Start: 2023-09-16 | End: 2023-09-18

## 2023-09-16 RX ORDER — SODIUM CHLORIDE 9 MG/ML
1000 INJECTION, SOLUTION INTRAVENOUS ONCE
Refills: 0 | Status: COMPLETED | OUTPATIENT
Start: 2023-09-16 | End: 2023-09-16

## 2023-09-16 RX ORDER — LOSARTAN POTASSIUM 100 MG/1
100 TABLET, FILM COATED ORAL DAILY
Refills: 0 | Status: DISCONTINUED | OUTPATIENT
Start: 2023-09-16 | End: 2023-09-16

## 2023-09-16 RX ORDER — ENOXAPARIN SODIUM 100 MG/ML
40 INJECTION SUBCUTANEOUS EVERY 24 HOURS
Refills: 0 | Status: DISCONTINUED | OUTPATIENT
Start: 2023-09-16 | End: 2023-09-18

## 2023-09-16 RX ORDER — INSULIN LISPRO 100/ML
VIAL (ML) SUBCUTANEOUS
Refills: 0 | Status: DISCONTINUED | OUTPATIENT
Start: 2023-09-16 | End: 2023-09-18

## 2023-09-16 RX ORDER — INFLUENZA VIRUS VACCINE 15; 15; 15; 15 UG/.5ML; UG/.5ML; UG/.5ML; UG/.5ML
0.7 SUSPENSION INTRAMUSCULAR ONCE
Refills: 0 | Status: DISCONTINUED | OUTPATIENT
Start: 2023-09-16 | End: 2023-09-18

## 2023-09-16 RX ORDER — BENZOCAINE AND MENTHOL 5; 1 G/100ML; G/100ML
1 LIQUID ORAL
Refills: 0 | Status: DISCONTINUED | OUTPATIENT
Start: 2023-09-16 | End: 2023-09-18

## 2023-09-16 RX ORDER — GLUCAGON INJECTION, SOLUTION 0.5 MG/.1ML
1 INJECTION, SOLUTION SUBCUTANEOUS ONCE
Refills: 0 | Status: DISCONTINUED | OUTPATIENT
Start: 2023-09-16 | End: 2023-09-18

## 2023-09-16 RX ORDER — GABAPENTIN 400 MG/1
100 CAPSULE ORAL ONCE
Refills: 0 | Status: DISCONTINUED | OUTPATIENT
Start: 2023-09-16 | End: 2023-09-16

## 2023-09-16 RX ADMIN — BENZOCAINE AND MENTHOL 1 LOZENGE: 5; 1 LIQUID ORAL at 23:20

## 2023-09-16 RX ADMIN — Medication 1000 MILLIGRAM(S): at 17:19

## 2023-09-16 RX ADMIN — Medication 400 MILLIGRAM(S): at 17:13

## 2023-09-16 RX ADMIN — Medication 15 MILLIGRAM(S): at 17:19

## 2023-09-16 RX ADMIN — Medication 400 MILLIGRAM(S): at 23:20

## 2023-09-16 RX ADMIN — Medication 104 MILLIGRAM(S): at 00:37

## 2023-09-16 RX ADMIN — BENZOCAINE AND MENTHOL 1 LOZENGE: 5; 1 LIQUID ORAL at 10:05

## 2023-09-16 RX ADMIN — Medication 5 MILLIGRAM(S): at 17:12

## 2023-09-16 RX ADMIN — Medication 15 MILLIGRAM(S): at 12:52

## 2023-09-16 RX ADMIN — Medication 1 ENEMA: at 03:14

## 2023-09-16 RX ADMIN — Medication 1000 MILLIGRAM(S): at 23:24

## 2023-09-16 RX ADMIN — FENTANYL CITRATE 25 MICROGRAM(S): 50 INJECTION INTRAVENOUS at 00:19

## 2023-09-16 RX ADMIN — ENOXAPARIN SODIUM 40 MILLIGRAM(S): 100 INJECTION SUBCUTANEOUS at 05:26

## 2023-09-16 RX ADMIN — Medication 1000 MILLIGRAM(S): at 12:58

## 2023-09-16 RX ADMIN — Medication 15 MILLIGRAM(S): at 23:19

## 2023-09-16 RX ADMIN — Medication 400 MILLIGRAM(S): at 05:26

## 2023-09-16 RX ADMIN — Medication 15 MILLIGRAM(S): at 05:31

## 2023-09-16 RX ADMIN — Medication 5 MILLIGRAM(S): at 06:00

## 2023-09-16 RX ADMIN — Medication 15 MILLIGRAM(S): at 23:24

## 2023-09-16 RX ADMIN — Medication 15 MILLIGRAM(S): at 05:26

## 2023-09-16 RX ADMIN — SODIUM CHLORIDE 100 MILLILITER(S): 9 INJECTION, SOLUTION INTRAVENOUS at 05:31

## 2023-09-16 RX ADMIN — Medication 400 MILLIGRAM(S): at 12:55

## 2023-09-16 RX ADMIN — Medication 3 MILLIGRAM(S): at 21:21

## 2023-09-16 RX ADMIN — FENTANYL CITRATE 25 MICROGRAM(S): 50 INJECTION INTRAVENOUS at 00:35

## 2023-09-16 RX ADMIN — Medication 15 MILLIGRAM(S): at 17:12

## 2023-09-16 RX ADMIN — Medication 1: at 05:27

## 2023-09-16 RX ADMIN — Medication 15 MILLIGRAM(S): at 12:58

## 2023-09-16 RX ADMIN — PANTOPRAZOLE SODIUM 20 MILLIGRAM(S): 20 TABLET, DELAYED RELEASE ORAL at 23:37

## 2023-09-16 RX ADMIN — Medication 1000 MILLIGRAM(S): at 05:31

## 2023-09-16 RX ADMIN — SODIUM CHLORIDE 100 MILLILITER(S): 9 INJECTION, SOLUTION INTRAVENOUS at 03:14

## 2023-09-16 RX ADMIN — SODIUM CHLORIDE 1000 MILLILITER(S): 9 INJECTION, SOLUTION INTRAVENOUS at 03:13

## 2023-09-16 NOTE — H&P ADULT - ATTENDING COMMENTS
Patient is seen and examined at bedside. Patient continued to complain of nausea. Stated that he passed gas prior to my exam. Abdomen is distended on physical exam. CT scan is ileus vs partial small bowel obstruction.    Place NGT attach to low wall suction  replete electrolytes   Start IVF for resuscitation  monitor for bowel function  will consider gastrograffin challenge in the morning

## 2023-09-16 NOTE — H&P ADULT - NSHPPHYSICALEXAM_GEN_ALL_CORE
General: NAD  CV: Normotensive, Tachycardic   RS: Nonlabored   Abd: Soft, distended, tender in LUQ, no rebound or guarding, no peritonitis   CNS: Alert, OX3

## 2023-09-16 NOTE — PATIENT PROFILE ADULT - FALL HARM RISK - HARM RISK INTERVENTIONS

## 2023-09-16 NOTE — ED ADULT NURSE REASSESSMENT NOTE - NS ED NURSE REASSESS COMMENT FT1
pt still c/o severe pain in abdomen, severe nausea, meds not improving. pt states that he "feels worse". notified provider-will come to bedside to assess pat. awaiting further POC.

## 2023-09-16 NOTE — ED ADULT NURSE REASSESSMENT NOTE - NS ED NURSE REASSESS COMMENT FT1
Pt c/o pain, nausea. Pain meds given, notified resident that nausea not improving with zofran x 2. recommended reglan.

## 2023-09-16 NOTE — H&P ADULT - ASSESSMENT
77 year old M, HTN, HLD, DM, presenting with two days of abdominal pain, nausea and vomiting, constipation, he is POD 6 from a L4/L5 laminectomy with NSGY, for chronic back pain, he was worked up and found to have a pSBO vs Ileus, he has been using narcotics for his pain and post surgical pain    Plan:   Admit to Surgery   IVF, NPO  NG tube to LCWS   AM labs   Lyte repletions K 4, Phos 3, Mag 2   VTE ppx

## 2023-09-16 NOTE — H&P ADULT - HISTORY OF PRESENT ILLNESS
77 year old M, HTN, HLD, DM, presenting with two days of abdominal pain, nausea and vomiting, constipation, he is POD 6 from a L4/L5 laminectomy with NSGY, for chronic back pain, no surgical history, no additional complaints.

## 2023-09-16 NOTE — H&P ADULT - NSHPLABSRESULTS_GEN_ALL_CORE
.  LABS:                         11.7   12.34 )-----------( 302      ( 15 Sep 2023 19:24 )             33.5     09-15    137  |  90<L>  |  18.2  ----------------------------<  193<H>  4.3   |  30.0<H>  |  0.83    Ca    10.2      15 Sep 2023 19:24    TPro  6.9  /  Alb  4.2  /  TBili  0.7  /  DBili  x   /  AST  32  /  ALT  22  /  AlkPhos  58  09-15    PT/INR - ( 15 Sep 2023 20:46 )   PT: 10.8 sec;   INR: 0.97 ratio         PTT - ( 15 Sep 2023 20:46 )  PTT:27.5 sec  Urinalysis Basic - ( 15 Sep 2023 20:11 )    Color: Yellow / Appearance: Clear / S.010 / pH: x  Gluc: x / Ketone: Large  / Bili: Negative / Urobili: Negative mg/dL   Blood: x / Protein: 30 mg/dL / Nitrite: Negative   Leuk Esterase: Trace / RBC: 0-2 /HPF / WBC 3-5 /HPF   Sq Epi: x / Non Sq Epi: x / Bacteria: x            RADIOLOGY, EKG & ADDITIONAL TESTS: Reviewed.     FINDINGS:  CHEST:  LUNGS AND LARGE AIRWAYS: Patent central airways. No pneumonia, edema or pneumothorax. Mild subpleural scarring and atelectasis.  PLEURA: No pleural effusion.  VESSELS: No thoracic aortic aneurysm. Atherosclerosis including of the coronary arteries.  HEART: Heart size is normal. No pericardial effusion.  MEDIASTINUM AND PEDRO: No lymphadenopathy.  CHEST WALL AND LOWER NECK: Within normal limits.    ABDOMEN AND PELVIS:  LIVER: Within normal limits.  BILE DUCTS: Normal caliber.  GALLBLADDER: Within normal limits.  SPLEEN: Within normal limits.  PANCREAS: Within normal limits.  ADRENALS: Within normal limits.  KIDNEYS/URETERS: Within normal limits.    BLADDER: Within normal limits.  REPRODUCTIVE ORGANS: Prostate mildly enlarged.    BOWEL: Distention of the stomach and jejunum. Nonfocal transition to decompressed ileum. Appendix is normal. No inflammation of the bowel.  PERITONEUM: No ascites.  VESSELS: Atherosclerosis.  RETROPERITONEUM/LYMPH NODES: No lymphadenopathy.  ABDOMINAL WALL: Within normal limits.  BONES: No acute fracture or aggressive osseous lesions.    LUMBAR SPINE:  VERTEBRAE: No fracture or dislocation. Changes of recent L5-S1 laminectomy, discectomy and posterior fusion again demonstrated. Bilateral pedicle screws are joined by spinal rods at this level. A disc spacer is in place. The hardware is intact. Schmorl's node superior endplate L5 remains present. Mild 2 mm anterolisthesis L5 on S1, alignment otherwise anatomic, unchanged.  SPINAL CANAL AND FORAMINA: At the L4 and L5 levels, the spinal canal is not discernible compared to postoperative changes in the adjacent soft tissues. At other levels there is no evidence of significant spinal canal narrowing.  PARASPINAL SOFT TISSUES: Small amount of postoperative air in the paraspinal soft tissues and adjacent to the psoas muscles. Skin staples remain present. The drain in the left posterior paraspinal soft tissues has been removed. No abscess or hematoma is evident.    IMPRESSION:    CT chest: No acute findings in the chest.    CT abdomen pelvis: Distention of the stomach and jejunum most likely ileus. Early or mild/partial obstruction possible but less likely.    CT lumbar spine: Expected postoperative changes. No fracture, intact hardware status post recent posterior fusion, laminectomy and discectomy at L5-S1. At the level of the surgery, the thecal sac and nerve roots are not discernible from postoperative changes with CT. If it is clinically necessary to evaluate the structures, MRI should be obtained.

## 2023-09-16 NOTE — CHART NOTE - NSCHARTNOTEFT_GEN_A_CORE
Pt's daughter Ciera called earlier today asking for an update on pt's hospital course. Patient gave me verbal permission to speak with his daughter. I had a nice conversation with Ciera @ [phone], provided her with updates and answered her questions to the best of my ability. Yaritza was appreciative of the call.

## 2023-09-17 LAB
ANION GAP SERPL CALC-SCNC: 12 MMOL/L — SIGNIFICANT CHANGE UP (ref 5–17)
BASOPHILS # BLD AUTO: 0.01 K/UL — SIGNIFICANT CHANGE UP (ref 0–0.2)
BASOPHILS NFR BLD AUTO: 0.2 % — SIGNIFICANT CHANGE UP (ref 0–2)
BUN SERPL-MCNC: 18.4 MG/DL — SIGNIFICANT CHANGE UP (ref 8–20)
CALCIUM SERPL-MCNC: 9.2 MG/DL — SIGNIFICANT CHANGE UP (ref 8.4–10.5)
CHLORIDE SERPL-SCNC: 97 MMOL/L — SIGNIFICANT CHANGE UP (ref 96–108)
CO2 SERPL-SCNC: 27 MMOL/L — SIGNIFICANT CHANGE UP (ref 22–29)
CREAT SERPL-MCNC: 0.81 MG/DL — SIGNIFICANT CHANGE UP (ref 0.5–1.3)
EGFR: 92 ML/MIN/1.73M2 — SIGNIFICANT CHANGE UP
EOSINOPHIL # BLD AUTO: 0.14 K/UL — SIGNIFICANT CHANGE UP (ref 0–0.5)
EOSINOPHIL NFR BLD AUTO: 2.3 % — SIGNIFICANT CHANGE UP (ref 0–6)
GLUCOSE BLDC GLUCOMTR-MCNC: 126 MG/DL — HIGH (ref 70–99)
GLUCOSE BLDC GLUCOMTR-MCNC: 136 MG/DL — HIGH (ref 70–99)
GLUCOSE BLDC GLUCOMTR-MCNC: 162 MG/DL — HIGH (ref 70–99)
GLUCOSE SERPL-MCNC: 106 MG/DL — HIGH (ref 70–99)
HCT VFR BLD CALC: 28.8 % — LOW (ref 39–50)
HCV AB S/CO SERPL IA: 0.2 S/CO — SIGNIFICANT CHANGE UP (ref 0–0.99)
HCV AB SERPL-IMP: SIGNIFICANT CHANGE UP
HGB BLD-MCNC: 9.8 G/DL — LOW (ref 13–17)
IMM GRANULOCYTES NFR BLD AUTO: 0.5 % — SIGNIFICANT CHANGE UP (ref 0–0.9)
LYMPHOCYTES # BLD AUTO: 1.85 K/UL — SIGNIFICANT CHANGE UP (ref 1–3.3)
LYMPHOCYTES # BLD AUTO: 29.8 % — SIGNIFICANT CHANGE UP (ref 13–44)
MAGNESIUM SERPL-MCNC: 2 MG/DL — SIGNIFICANT CHANGE UP (ref 1.6–2.6)
MCHC RBC-ENTMCNC: 30.8 PG — SIGNIFICANT CHANGE UP (ref 27–34)
MCHC RBC-ENTMCNC: 34 GM/DL — SIGNIFICANT CHANGE UP (ref 32–36)
MCV RBC AUTO: 90.6 FL — SIGNIFICANT CHANGE UP (ref 80–100)
MONOCYTES # BLD AUTO: 0.63 K/UL — SIGNIFICANT CHANGE UP (ref 0–0.9)
MONOCYTES NFR BLD AUTO: 10.1 % — SIGNIFICANT CHANGE UP (ref 2–14)
NEUTROPHILS # BLD AUTO: 3.55 K/UL — SIGNIFICANT CHANGE UP (ref 1.8–7.4)
NEUTROPHILS NFR BLD AUTO: 57.1 % — SIGNIFICANT CHANGE UP (ref 43–77)
PHOSPHATE SERPL-MCNC: 3.9 MG/DL — SIGNIFICANT CHANGE UP (ref 2.4–4.7)
PLATELET # BLD AUTO: 217 K/UL — SIGNIFICANT CHANGE UP (ref 150–400)
POTASSIUM SERPL-MCNC: 3.9 MMOL/L — SIGNIFICANT CHANGE UP (ref 3.5–5.3)
POTASSIUM SERPL-SCNC: 3.9 MMOL/L — SIGNIFICANT CHANGE UP (ref 3.5–5.3)
RBC # BLD: 3.18 M/UL — LOW (ref 4.2–5.8)
RBC # FLD: 12.4 % — SIGNIFICANT CHANGE UP (ref 10.3–14.5)
SODIUM SERPL-SCNC: 136 MMOL/L — SIGNIFICANT CHANGE UP (ref 135–145)
WBC # BLD: 6.21 K/UL — SIGNIFICANT CHANGE UP (ref 3.8–10.5)
WBC # FLD AUTO: 6.21 K/UL — SIGNIFICANT CHANGE UP (ref 3.8–10.5)

## 2023-09-17 PROCEDURE — 99231 SBSQ HOSP IP/OBS SF/LOW 25: CPT

## 2023-09-17 RX ORDER — SIMETHICONE 80 MG/1
80 TABLET, CHEWABLE ORAL
Refills: 0 | Status: DISCONTINUED | OUTPATIENT
Start: 2023-09-17 | End: 2023-09-18

## 2023-09-17 RX ORDER — BENZOCAINE 10 %
1 GEL (GRAM) MUCOUS MEMBRANE EVERY 6 HOURS
Refills: 0 | Status: DISCONTINUED | OUTPATIENT
Start: 2023-09-17 | End: 2023-09-18

## 2023-09-17 RX ORDER — ACETAMINOPHEN 500 MG
975 TABLET ORAL EVERY 6 HOURS
Refills: 0 | Status: DISCONTINUED | OUTPATIENT
Start: 2023-09-17 | End: 2023-09-18

## 2023-09-17 RX ADMIN — Medication 5 MILLIGRAM(S): at 11:16

## 2023-09-17 RX ADMIN — SODIUM CHLORIDE 100 MILLILITER(S): 9 INJECTION, SOLUTION INTRAVENOUS at 03:13

## 2023-09-17 RX ADMIN — SIMETHICONE 80 MILLIGRAM(S): 80 TABLET, CHEWABLE ORAL at 23:56

## 2023-09-17 RX ADMIN — Medication 1: at 16:31

## 2023-09-17 RX ADMIN — PANTOPRAZOLE SODIUM 20 MILLIGRAM(S): 20 TABLET, DELAYED RELEASE ORAL at 11:14

## 2023-09-17 RX ADMIN — ENOXAPARIN SODIUM 40 MILLIGRAM(S): 100 INJECTION SUBCUTANEOUS at 05:19

## 2023-09-17 RX ADMIN — Medication 975 MILLIGRAM(S): at 23:56

## 2023-09-18 ENCOUNTER — TRANSCRIPTION ENCOUNTER (OUTPATIENT)
Age: 78
End: 2023-09-18

## 2023-09-18 VITALS
SYSTOLIC BLOOD PRESSURE: 156 MMHG | HEART RATE: 64 BPM | OXYGEN SATURATION: 98 % | DIASTOLIC BLOOD PRESSURE: 56 MMHG | TEMPERATURE: 98 F | RESPIRATION RATE: 18 BRPM

## 2023-09-18 LAB
ANION GAP SERPL CALC-SCNC: 12 MMOL/L — SIGNIFICANT CHANGE UP (ref 5–17)
BUN SERPL-MCNC: 9.9 MG/DL — SIGNIFICANT CHANGE UP (ref 8–20)
CALCIUM SERPL-MCNC: 8.8 MG/DL — SIGNIFICANT CHANGE UP (ref 8.4–10.5)
CHLORIDE SERPL-SCNC: 97 MMOL/L — SIGNIFICANT CHANGE UP (ref 96–108)
CO2 SERPL-SCNC: 26 MMOL/L — SIGNIFICANT CHANGE UP (ref 22–29)
CREAT SERPL-MCNC: 0.76 MG/DL — SIGNIFICANT CHANGE UP (ref 0.5–1.3)
CULTURE RESULTS: SIGNIFICANT CHANGE UP
EGFR: 94 ML/MIN/1.73M2 — SIGNIFICANT CHANGE UP
GLUCOSE BLDC GLUCOMTR-MCNC: 128 MG/DL — HIGH (ref 70–99)
GLUCOSE BLDC GLUCOMTR-MCNC: 144 MG/DL — HIGH (ref 70–99)
GLUCOSE SERPL-MCNC: 113 MG/DL — HIGH (ref 70–99)
MAGNESIUM SERPL-MCNC: 1.9 MG/DL — SIGNIFICANT CHANGE UP (ref 1.8–2.6)
PHOSPHATE SERPL-MCNC: 2.9 MG/DL — SIGNIFICANT CHANGE UP (ref 2.4–4.7)
POTASSIUM SERPL-MCNC: 3.7 MMOL/L — SIGNIFICANT CHANGE UP (ref 3.5–5.3)
POTASSIUM SERPL-SCNC: 3.7 MMOL/L — SIGNIFICANT CHANGE UP (ref 3.5–5.3)
SODIUM SERPL-SCNC: 135 MMOL/L — SIGNIFICANT CHANGE UP (ref 135–145)
SPECIMEN SOURCE: SIGNIFICANT CHANGE UP

## 2023-09-18 PROCEDURE — 83690 ASSAY OF LIPASE: CPT

## 2023-09-18 PROCEDURE — 80048 BASIC METABOLIC PNL TOTAL CA: CPT

## 2023-09-18 PROCEDURE — C9399: CPT

## 2023-09-18 PROCEDURE — 83605 ASSAY OF LACTIC ACID: CPT

## 2023-09-18 PROCEDURE — 86803 HEPATITIS C AB TEST: CPT

## 2023-09-18 PROCEDURE — 96374 THER/PROPH/DIAG INJ IV PUSH: CPT

## 2023-09-18 PROCEDURE — 71045 X-RAY EXAM CHEST 1 VIEW: CPT

## 2023-09-18 PROCEDURE — 84295 ASSAY OF SERUM SODIUM: CPT

## 2023-09-18 PROCEDURE — 83735 ASSAY OF MAGNESIUM: CPT

## 2023-09-18 PROCEDURE — 71250 CT THORAX DX C-: CPT | Mod: MA

## 2023-09-18 PROCEDURE — 82330 ASSAY OF CALCIUM: CPT

## 2023-09-18 PROCEDURE — 87040 BLOOD CULTURE FOR BACTERIA: CPT

## 2023-09-18 PROCEDURE — 85025 COMPLETE CBC W/AUTO DIFF WBC: CPT

## 2023-09-18 PROCEDURE — 86900 BLOOD TYPING SEROLOGIC ABO: CPT

## 2023-09-18 PROCEDURE — 99285 EMERGENCY DEPT VISIT HI MDM: CPT

## 2023-09-18 PROCEDURE — 85730 THROMBOPLASTIN TIME PARTIAL: CPT

## 2023-09-18 PROCEDURE — 84100 ASSAY OF PHOSPHORUS: CPT

## 2023-09-18 PROCEDURE — 85610 PROTHROMBIN TIME: CPT

## 2023-09-18 PROCEDURE — 87086 URINE CULTURE/COLONY COUNT: CPT

## 2023-09-18 PROCEDURE — 82962 GLUCOSE BLOOD TEST: CPT

## 2023-09-18 PROCEDURE — 82435 ASSAY OF BLOOD CHLORIDE: CPT

## 2023-09-18 PROCEDURE — 85652 RBC SED RATE AUTOMATED: CPT

## 2023-09-18 PROCEDURE — 0225U NFCT DS DNA&RNA 21 SARSCOV2: CPT

## 2023-09-18 PROCEDURE — 99232 SBSQ HOSP IP/OBS MODERATE 35: CPT | Mod: FS

## 2023-09-18 PROCEDURE — 86850 RBC ANTIBODY SCREEN: CPT

## 2023-09-18 PROCEDURE — 82803 BLOOD GASES ANY COMBINATION: CPT

## 2023-09-18 PROCEDURE — 86901 BLOOD TYPING SEROLOGIC RH(D): CPT

## 2023-09-18 PROCEDURE — 82947 ASSAY GLUCOSE BLOOD QUANT: CPT

## 2023-09-18 PROCEDURE — 85014 HEMATOCRIT: CPT

## 2023-09-18 PROCEDURE — 84132 ASSAY OF SERUM POTASSIUM: CPT

## 2023-09-18 PROCEDURE — 85018 HEMOGLOBIN: CPT

## 2023-09-18 PROCEDURE — 80053 COMPREHEN METABOLIC PANEL: CPT

## 2023-09-18 PROCEDURE — 96375 TX/PRO/DX INJ NEW DRUG ADDON: CPT

## 2023-09-18 PROCEDURE — 81001 URINALYSIS AUTO W/SCOPE: CPT

## 2023-09-18 PROCEDURE — G1004: CPT

## 2023-09-18 PROCEDURE — 99239 HOSP IP/OBS DSCHRG MGMT >30: CPT

## 2023-09-18 PROCEDURE — 93005 ELECTROCARDIOGRAM TRACING: CPT

## 2023-09-18 PROCEDURE — 84484 ASSAY OF TROPONIN QUANT: CPT

## 2023-09-18 PROCEDURE — 83880 ASSAY OF NATRIURETIC PEPTIDE: CPT

## 2023-09-18 PROCEDURE — 74177 CT ABD & PELVIS W/CONTRAST: CPT | Mod: ME

## 2023-09-18 PROCEDURE — 86140 C-REACTIVE PROTEIN: CPT

## 2023-09-18 PROCEDURE — 36415 COLL VENOUS BLD VENIPUNCTURE: CPT

## 2023-09-18 RX ORDER — ATORVASTATIN CALCIUM 80 MG/1
40 TABLET, FILM COATED ORAL AT BEDTIME
Refills: 0 | Status: DISCONTINUED | OUTPATIENT
Start: 2023-09-18 | End: 2023-09-18

## 2023-09-18 RX ORDER — SIMETHICONE 80 MG/1
1 TABLET, CHEWABLE ORAL
Qty: 0 | Refills: 0 | DISCHARGE
Start: 2023-09-18

## 2023-09-18 RX ORDER — LOSARTAN POTASSIUM 100 MG/1
100 TABLET, FILM COATED ORAL DAILY
Refills: 0 | Status: DISCONTINUED | OUTPATIENT
Start: 2023-09-18 | End: 2023-09-18

## 2023-09-18 RX ORDER — ATENOLOL 25 MG/1
25 TABLET ORAL DAILY
Refills: 0 | Status: DISCONTINUED | OUTPATIENT
Start: 2023-09-18 | End: 2023-09-18

## 2023-09-18 RX ORDER — SIMETHICONE 80 MG/1
80 TABLET, CHEWABLE ORAL ONCE
Refills: 0 | Status: COMPLETED | OUTPATIENT
Start: 2023-09-18 | End: 2023-09-18

## 2023-09-18 RX ADMIN — SIMETHICONE 80 MILLIGRAM(S): 80 TABLET, CHEWABLE ORAL at 12:23

## 2023-09-18 RX ADMIN — ENOXAPARIN SODIUM 40 MILLIGRAM(S): 100 INJECTION SUBCUTANEOUS at 05:32

## 2023-09-18 RX ADMIN — Medication 975 MILLIGRAM(S): at 00:50

## 2023-09-18 RX ADMIN — PANTOPRAZOLE SODIUM 20 MILLIGRAM(S): 20 TABLET, DELAYED RELEASE ORAL at 12:23

## 2023-09-18 RX ADMIN — Medication 975 MILLIGRAM(S): at 07:04

## 2023-09-18 RX ADMIN — Medication 975 MILLIGRAM(S): at 06:14

## 2023-09-18 RX ADMIN — SIMETHICONE 80 MILLIGRAM(S): 80 TABLET, CHEWABLE ORAL at 05:29

## 2023-09-18 NOTE — DISCHARGE NOTE PROVIDER - CARE PROVIDER_API CALL
Kenyatta Pollard  [address] [address]  Phone: [phone]  Fax: [phone]  Follow Up Time:     PMD,   Phone: (   )    -  Fax: (   )    -  Follow Up Time:

## 2023-09-18 NOTE — DISCHARGE NOTE PROVIDER - HOSPITAL COURSE
77M PMHx HTN, HLD, DM, who presented with two days of abdominal pain with associated nausea, vomiting, and constipation.  On presentation he was POD 6 from a L4/L5 laminectomy with NSGY, for chronic back pain.  CT imaging revealed ileus.  NGT placed for decompression.  Pt. began passing gas, NGT removed and now tolerating diet.  Pt stable for discharge at this time.

## 2023-09-18 NOTE — DISCHARGE NOTE PROVIDER - NSDCFUSCHEDAPPT_GEN_ALL_CORE_FT
Kenyatta Pollard Physician [facility]  NEUROSURG [address] [place] [address]  Scheduled Appointment: 09/28/2023

## 2023-09-18 NOTE — DISCHARGE NOTE PROVIDER - NSDCMRMEDTOKEN_GEN_ALL_CORE_FT
acetaminophen 325 mg oral tablet: 2 tab(s) orally every 6 hours  atenolol 25 mg oral tablet: 1 orally once a day  fish oil daily:   gabapentin 100 mg oral capsule: 1 cap(s) orally once a day (at bedtime) MDD: 1 cap  losartan 100 mg oral tablet: 1 orally once a day  metFORMIN 500 mg oral tablet: 1 orally 2 times a day  methocarbamol 750 mg oral tablet: 1 tab(s) orally every 8 hours as needed for Muscle Spasm Take 1 tab by mouth every 8 hours as needed for muscle spasm MDD: 3 tabs  Multiple Vitamins oral tablet: 1 tab(s) orally once a day  oxyCODONE 5 mg oral tablet: 2 tab(s) orally every 4 hours as needed for  severe pain Take 1-2 tabs by mouth every 4 hours as needed for severe pain MDD: 12 tabs  probiotic daily:   Rolling walker: Use as directed  rosuvastatin 10 mg oral capsule: 1 orally once a day  senna leaf extract oral tablet: 2 tab(s) orally once a day (at bedtime) MDD: 2 tabs  simethicone 80 mg oral tablet, chewable: 1 tab(s) orally 4 times a day As needed Gas

## 2023-09-18 NOTE — PROGRESS NOTE ADULT - SUBJECTIVE AND OBJECTIVE BOX
SUBJECTIVE:   77M known to neurosurgery service s/p L5-S1 PLIF 9/11/23 w/ Dr. Pollard, DMII, HTN, HLD, cardiac murmur, carotid stenosis, discharged 9/14 presents to ED c/o N/V/abd pain localized to LLQ. Patient states he has been taking pain medications and did not start stool softeners until tonight, which he threw up. Had large BM documented while admitted 9/12. Had small BM this AM per patient. Patient denies any new motor/sensation symptoms, exam stable from discharge. Incision healing well, soft, nontender, staples in place w/ no dehiscence/drainage/collection.        INTERVAL HX/OVERNIGHT EVENTS:  Patient seen and examined at bedside. NG tube removed yesterday. Had a BM yesterday and has been passing gas. Was able to tolerate eating breakfast this morning w/o issues. Reports he is feeling much better. Reports some LLE numbness, same as previous. Able to ambulate w/ walker.     Vital Signs Last 24 Hrs  T(C): 36.7 (09-18-23 @ 08:00), Max: 37.2 (09-17-23 @ 16:21)  T(F): 98 (09-18-23 @ 08:00), Max: 99 (09-17-23 @ 16:21)  HR: 64 (09-18-23 @ 08:00) (64 - 92)  BP: 156/56 (09-18-23 @ 08:00) (143/71 - 175/77)  BP(mean): --  RR: 18 (09-18-23 @ 08:00) (18 - 19)  SpO2: 98% (09-18-23 @ 08:00) (94% - 98%)    PHYSICAL EXAM:    GENERAL: NAD    INCISION: sutures at drain removal site, staples in place along incision, c/d/i  HEAD: normocephalic   MENTAL STATUS: AAO x 3, answers questions appropriately, following simple commands    CRANIAL NERVES: PERRL, EOMI without nystagmus. Face symmetric, Tongue is midline. Hearing grossly intact. Head turning and shoulder shrug intact.    MOTOR: strength b/l UEs 5/5; b/l LEs HF/KE 4+/5, DF/EHL/PF 5/5    SENSATION: grossly intact to light touch all extremities   ABDOMEN: Soft, nondistended  SKIN: Warm, dry     LABS:                                     9.8    6.21  )-----------( 217      ( 17 Sep 2023 04:54 )             28.8   09-18    135  |  97  |  9.9  ----------------------------<  113<H>  3.7   |  26.0  |  0.76    Ca    8.8      18 Sep 2023 04:35  Phos  2.9     09-18  Mg     1.9     09-18      IMAGING:     CT Lumbar Spine Reform No Cont (09.15.23 @ 23:42)   IMPRESSION:    CT chest: No acute findings in the chest.    CT abdomen pelvis: Distention of the stomach and jejunum most likely ileus. Early or mild/partial obstruction possible but less likely.    CT lumbar spine: Expected postoperative changes. No fracture, intact hardware status post recent posterior fusion, laminectomy and discectomy at L5-S1. At the level of the surgery, the thecal sac and nerve roots are not discernible from postoperative changes with CT. If it is clinically necessary to evaluate the structures, MRI should be obtained.                
SUBJECTIVE / 24H EVENTS: Patient seen and examined. Reports feeling well today. NGT was removed yesterday & he was adv to CLD which he reports he tolerated well for breakfast & dinner. Passing gas, states he has mild lower abd cramping. He has been OOB ambulating and voiding. Denies fever, chills, CP or SOB.     MEDICATIONS  (STANDING):  atenolol  Tablet 25 milliGRAM(s) Oral daily  atorvastatin 40 milliGRAM(s) Oral at bedtime  dextrose 5%. 1000 milliLiter(s) (50 mL/Hr) IV Continuous <Continuous>  dextrose 5%. 1000 milliLiter(s) (100 mL/Hr) IV Continuous <Continuous>  dextrose 50% Injectable 12.5 Gram(s) IV Push once  dextrose 50% Injectable 25 Gram(s) IV Push once  dextrose 50% Injectable 25 Gram(s) IV Push once  enoxaparin Injectable 40 milliGRAM(s) SubCutaneous every 24 hours  glucagon  Injectable 1 milliGRAM(s) IntraMuscular once  influenza  Vaccine (HIGH DOSE) 0.7 milliLiter(s) IntraMuscular once  insulin lispro (ADMELOG) corrective regimen sliding scale   SubCutaneous three times a day before meals  losartan 100 milliGRAM(s) Oral daily  melatonin 3 milliGRAM(s) Oral at bedtime  pantoprazole  Injectable 20 milliGRAM(s) IV Push daily    MEDICATIONS  (PRN):  acetaminophen     Tablet .. 975 milliGRAM(s) Oral every 6 hours PRN Mild Pain (1 - 3), Moderate Pain (4 - 6), Severe Pain (7 - 10)  benzocaine 20% Spray 1 Spray(s) Topical every 6 hours PRN sore throat  benzocaine/menthol Lozenge 1 Lozenge Oral four times a day PRN Sore Throat  dextrose Oral Gel 15 Gram(s) Oral once PRN Blood Glucose LESS THAN 70 milliGRAM(s)/deciliter  ondansetron Injectable 4 milliGRAM(s) IV Push every 6 hours PRN Nausea  simethicone 80 milliGRAM(s) Chew four times a day PRN Gas      Vital Signs Last 24 Hrs  T(C): 36.7 (18 Sep 2023 04:14), Max: 37.2 (17 Sep 2023 16:21)  T(F): 98 (18 Sep 2023 04:14), Max: 99 (17 Sep 2023 16:21)  HR: 78 (18 Sep 2023 04:14) (78 - 92)  BP: 165/75 (18 Sep 2023 04:14) (143/71 - 175/77)  BP(mean): --  RR: 18 (18 Sep 2023 04:14) (18 - 19)  SpO2: 98% (18 Sep 2023 04:14) (94% - 98%)    Parameters below as of 18 Sep 2023 04:14  Patient On (Oxygen Delivery Method): room air        Constitutional: patient resting comfortably in bed, in no acute distress  Respiratory: respirations are unlabored, no accessory muscle use, no conversational dyspnea  Cardiovascular: regular rate & rhythm  Gastrointestinal: abdomen soft, non-tender, non-distended, no rebound tenderness / guarding  Neurological: A&O x 3  Skin: mucous membranes moist, no diaphoresis, pallor, cyanosis or jaundice      I&O's Detail    17 Sep 2023 07:01  -  18 Sep 2023 07:00  --------------------------------------------------------  IN:  Total IN: 0 mL    OUT:    Voided (mL): 275 mL  Total OUT: 275 mL    Total NET: -275 mL          LABS:                        9.8    6.21  )-----------( 217      ( 17 Sep 2023 04:54 )             28.8     09-18    135  |  97  |  9.9  ----------------------------<  113<H>  3.7   |  26.0  |  0.76    Ca    8.8      18 Sep 2023 04:35  Phos  2.9     09-18  Mg     1.9     09-18        Urinalysis Basic - ( 18 Sep 2023 04:35 )    Color: x / Appearance: x / SG: x / pH: x  Gluc: 113 mg/dL / Ketone: x  / Bili: x / Urobili: x   Blood: x / Protein: x / Nitrite: x   Leuk Esterase: x / RBC: x / WBC x   Sq Epi: x / Non Sq Epi: x / Bacteria: x      
SUBJECTIVE:   77M known to neurosurgery service s/p L5-S1 PLIF 9/11/23 w/ Dr. Pollard, DMII, HTN, HLD, cardiac murmur, carotid stenosis, discharged 9/14 presents to ED c/o N/V/abd pain localized to LLQ. Patient states he has been taking pain medications and did not start stool softeners until tonight, which he threw up. Had large BM documented while admitted 9/12. Had small BM this AM per patient. Patient denies any new motor/sensation symptoms, exam stable from discharge. Incision healing well, soft, nontender, staples in place w/ no dehiscence/drainage/collection.        INTERVAL HX/OVERNIGHT EVENTS:  Patient seen and examined at bedside. NG tube in place. Reports back feels okay. Mostly complaining of abdominal discomfort.      Vital Signs Last 24 Hrs  T(C): 36.9 (09-16-23 @ 04:05), Max: 38.7 (09-15-23 @ 20:39)  T(F): 98.5 (09-16-23 @ 04:05), Max: 101.6 (09-15-23 @ 20:39)  HR: 78 (09-16-23 @ 08:30) (78 - 121)  BP: 148/69 (09-16-23 @ 08:30) (128/70 - 193/80)  BP(mean): --  RR: 18 (09-16-23 @ 08:30) (16 - 18)  SpO2: 96% (09-16-23 @ 08:30) (95% - 99%)    PHYSICAL EXAM:    GENERAL: NAD    INCISION: sutures at drain removal site, staples in place along incision, c/d/i  HEAD: normocephalic   MENTAL STATUS: AAO x 3, answers questions appropriately, following simple commands    CRANIAL NERVES: PERRL, EOMI without nystagmus. Face symmetric, Tongue is midline. Hearing grossly intact. Head turning and shoulder shrug intact.    MOTOR: strength b/l UEs 5/5; b/l LEs HF/KE 4+/5, DF/EHL/PF 5/5    SENSATION: grossly intact to light touch all extremities   ABDOMEN: Soft, distended  SKIN: Warm, dry     LABS:                          11.7   12.34 )-----------( 302      ( 15 Sep 2023 19:24 )             33.5    09-15    137  |  90<L>  |  18.2  ----------------------------<  193<H>  4.3   |  30.0<H>  |  0.83    Ca    10.2      15 Sep 2023 19:24    TPro  6.9  /  Alb  4.2  /  TBili  0.7  /  DBili  x   /  AST  32  /  ALT  22  /  AlkPhos  58  09-15  PT/INR - ( 15 Sep 2023 20:46 )   PT: 10.8 sec;   INR: 0.97 ratio         PTT - ( 15 Sep 2023 20:46 )  PTT:27.5 sec    09-15 @ 07:01  -  09-16 @ 07:00  --------------------------------------------------------  IN: 400 mL / OUT: 0 mL / NET: 400 mL        IMAGING:     CT Lumbar Spine Reform No Cont (09.15.23 @ 23:42)   IMPRESSION:    CT chest: No acute findings in the chest.    CT abdomen pelvis: Distention of the stomach and jejunum most likely ileus. Early or mild/partial obstruction possible but less likely.    CT lumbar spine: Expected postoperative changes. No fracture, intact hardware status post recent posterior fusion, laminectomy and discectomy at L5-S1. At the level of the surgery, the thecal sac and nerve roots are not discernible from postoperative changes with CT. If it is clinically necessary to evaluate the structures, MRI should be obtained.                
Subjective:77yMale s/p recent laminectomy, readmitted with sbo vs ileus yesterday. NGT placed for dilated stomach and proximal SB.  Pt c/o pain in throat and burning in stomach from NGT.  pt wants NGT out.  I explained to pt he needs the NGT for his ileus and dilated stomach and bowel.  He reluctantly agreed and understood, really wants tube out.  pt states he had a small BM and flatus yesterday am.        Vital Signs Last 24 Hrs  T(C): 36.9 (16 Sep 2023 23:15), Max: 37 (16 Sep 2023 17:00)  T(F): 98.5 (16 Sep 2023 23:15), Max: 98.6 (16 Sep 2023 17:00)  HR: 98 (16 Sep 2023 23:15) (73 - 107)  BP: 135/75 (16 Sep 2023 23:15) (120/66 - 193/80)  BP(mean): --  RR: 18 (16 Sep 2023 23:15) (16 - 19)  SpO2: 93% (16 Sep 2023 23:15) (93% - 96%)    Parameters below as of 16 Sep 2023 23:15  Patient On (Oxygen Delivery Method): room air      I&O's Detail    15 Sep 2023 07:01  -  16 Sep 2023 07:00  --------------------------------------------------------  IN:    Lactated Ringers: 400 mL  Total IN: 400 mL    OUT:  Total OUT: 0 mL    Total NET: 400 mL      16 Sep 2023 07:01  -  17 Sep 2023 02:12  --------------------------------------------------------  IN:    Lactated Ringers: 600 mL  Total IN: 600 mL    OUT:    Nasogastric/Oral tube (mL): 250 mL    Voided (mL): 400 mL  Total OUT: 650 mL    Total NET: -50 mL          Labs:                        8.9    5.69  )-----------( 202      ( 16 Sep 2023 10:16 )             26.2     09-16    139  |  98  |  19.2  ----------------------------<  120<H>  4.3   |  32.0<H>  |  0.88    Ca    8.7      16 Sep 2023 10:16  Phos  4.1     09-16  Mg     1.9     09-16    TPro  6.9  /  Alb  4.2  /  TBili  0.7  /  DBili  x   /  AST  32  /  ALT  22  /  AlkPhos  58  09-15    PT/INR - ( 15 Sep 2023 20:46 )   PT: 10.8 sec;   INR: 0.97 ratio         PTT - ( 15 Sep 2023 20:46 )  PTT:27.5 sec      Culture - Blood (collected 15 Sep 2023 20:46)  Source: .Blood Blood-Peripheral  Preliminary Report (17 Sep 2023 02:02):    No growth at 24 hours    Culture - Blood (collected 15 Sep 2023 20:46)  Source: .Blood Blood-Peripheral  Preliminary Report (17 Sep 2023 02:02):    No growth at 24 hours

## 2023-09-18 NOTE — PROGRESS NOTE ADULT - ASSESSMENT
ASSESSMENT:   77M known to neurosurgery service s/p L5-S1 PLIF 9/11/23 w/ Dr. Pollard, DMII, HTN, HLD, cardiac murmur, carotid stenosis, discharged 9/14 presents to ED c/o N/V/abd pain localized to LLQ.      PLAN:    -No acute neurosurgical eval recommended at this time  -Will continue to follow  -Further medical management per primary team  -Will further discuss case with attending  
Pt is a 76 y/o male who had a laminectomy on 9/11, admitted to the surgical service now for mgmt of ileus. Had return of bowel fxn yesterday, tolerating CLD, passing gas  - Adv to regular diet this AM  - If tolerates well and continues to have bowel fxn, possible d/c home later today  - Encourage frequent ambulation  - PO home meds restarted  - Pain control w/ tylenol, PRN  - DVT ppx w/ lovenox & SCDs
ASSESSMENT:   77M known to neurosurgery service s/p L5-S1 PLIF 9/11/23 w/ Dr. Pollard, DMII, HTN, HLD, cardiac murmur, carotid stenosis, discharged 9/14 presents to ED c/o N/V/abd pain localized to LLQ. Pt has NG tube removed yesterday, able to tolerate breakfast.     PLAN:    -No acute neurosurgical eval recommended at this time  -F/u with Dr. Pollard in clinic in 1 week for staple removal  -Will sign off, reconsult prn  -Further medical management per primary team  -Discussed case with Dr. Pollard  
76 yo male with ileus vs sbo  - cont ngt to wall suction  - throat lozenges  - Protonix IV for dyspepsia  - OOB to chair  - hurricane spray if needed  - am labs  - IVF

## 2023-09-18 NOTE — DISCHARGE NOTE PROVIDER - YES NO FOR MLM POSITIVE OR NEGATIVE COVID RESULT
, .  LABS:                         13.1   9.35  )-----------( 366      ( 21 Feb 2022 06:59 )             40.8     02-21    136  |  102  |  8   ----------------------------<  204<H>  4.2   |  29  |  0.60    Ca    8.1<L>      21 Feb 2022 06:59  Phos  2.2     02-21  Mg     1.7     02-21      PT/INR - ( 21 Feb 2022 06:59 )   PT: 14.7 sec;   INR: 1.28 ratio         PTT - ( 21 Feb 2022 06:59 )  PTT:30.7 sec              RADIOLOGY, EKG & ADDITIONAL TESTS: Reviewed. LABS:                         13.1   9.35  )-----------( 366      ( 21 Feb 2022 06:59 )             40.8     02-21    136  |  102  |  8   ----------------------------<  204<H>  4.2   |  29  |  0.60    Ca    8.1<L>      21 Feb 2022 06:59  Phos  2.2     02-21  Mg     1.7     02-21      PT/INR - ( 21 Feb 2022 06:59 )   PT: 14.7 sec;   INR: 1.28 ratio         PTT - ( 21 Feb 2022 06:59 )  PTT:30.7 sec              RADIOLOGY, EKG & ADDITIONAL TESTS: Reviewed.

## 2023-09-18 NOTE — DISCHARGE NOTE NURSING/CASE MANAGEMENT/SOCIAL WORK - PATIENT PORTAL LINK FT
You can access the FollowMyHealth Patient Portal offered by [facility] by registering at the following website: [email or web address]. By joining [facility]’s FollowMyHealth portal, you will also be able to view your health information using other applications (apps) compatible with our system.

## 2023-09-18 NOTE — PROGRESS NOTE ADULT - NS ATTEND AMEND GEN_ALL_CORE FT
78 y/o male s/p  laminectomy on 9/11 with postoperative ileus; now resolved.     Afebrile. Hemodynamically Stable.   AOx3. Resting comfortably.   No respiratory distress.   Intact distal pulses.   Abdomen s/nt/nd.   Extremities warm to touch.     Ileus: Tolerating diet. Having bowel function. Cleared for discharge home.
AMRIT Attg:    see my addendum to initial consult
Agree with above assessment.  The patient was seen and examined by myself with the surgical PA.  The patient is without abdominal pain, nausea, or vomit.  The patient reports flatus and BM this morning. Abdomen is soft, non tender, no guarding, no rebound.  Will clamp NGT, trial clears, if tolerates then will remove NGT.

## 2023-09-18 NOTE — DISCHARGE NOTE PROVIDER - NSDCCPCAREPLAN_GEN_ALL_CORE_FT
PRINCIPAL DISCHARGE DIAGNOSIS  Diagnosis: Postoperative ileus  Assessment and Plan of Treatment: Follow up: Follow up with Neurosurgery as previously instructed.   Also, please call and make an appointment with your primary care physician as per your usual schedule.   Activity: As per prior Neurosurgery instructions  Diet: May continue regular diet.  Medications: Please take all medications listed on your discharge paperwork as prescribed.   Patient is advised to RETURN TO THE EMERGENCY DEPARTMENT for any of the following - worsening pain, fever/chills, nausea/vomiting, altered mental status, chest pain, shortness of breath, or any other new / worsening symptom.

## 2023-09-18 NOTE — DISCHARGE NOTE NURSING/CASE MANAGEMENT/SOCIAL WORK - NSDCPEFALRISK_GEN_ALL_CORE
For information on Fall & Injury Prevention, visit: [email or web address] OR  [email or web address] OR  https://www.cdc.gov/steadi/patient.html

## 2023-09-21 LAB
CULTURE RESULTS: SIGNIFICANT CHANGE UP
CULTURE RESULTS: SIGNIFICANT CHANGE UP
SPECIMEN SOURCE: SIGNIFICANT CHANGE UP
SPECIMEN SOURCE: SIGNIFICANT CHANGE UP

## 2023-09-28 ENCOUNTER — APPOINTMENT (OUTPATIENT)
Dept: NEUROSURGERY | Facility: CLINIC | Age: 78
End: 2023-09-28
Payer: COMMERCIAL

## 2023-09-28 VITALS
TEMPERATURE: 98 F | OXYGEN SATURATION: 98 % | SYSTOLIC BLOOD PRESSURE: 133 MMHG | BODY MASS INDEX: 23.24 KG/M2 | HEIGHT: 71 IN | WEIGHT: 166 LBS | HEART RATE: 71 BPM | DIASTOLIC BLOOD PRESSURE: 69 MMHG

## 2023-09-28 PROCEDURE — 99024 POSTOP FOLLOW-UP VISIT: CPT

## 2023-10-03 ENCOUNTER — RESULT REVIEW (OUTPATIENT)
Age: 78
End: 2023-10-03

## 2023-10-03 ENCOUNTER — EMERGENCY (EMERGENCY)
Facility: HOSPITAL | Age: 78
LOS: 1 days | Discharge: HOME | End: 2023-10-03
Attending: STUDENT IN AN ORGANIZED HEALTH CARE EDUCATION/TRAINING PROGRAM
Payer: MEDICARE

## 2023-10-03 ENCOUNTER — OUTPATIENT (OUTPATIENT)
Dept: OUTPATIENT SERVICES | Facility: HOSPITAL | Age: 78
LOS: 1 days | End: 2023-10-03

## 2023-10-03 ENCOUNTER — NON-APPOINTMENT (OUTPATIENT)
Age: 78
End: 2023-10-03

## 2023-10-03 ENCOUNTER — APPOINTMENT (OUTPATIENT)
Dept: ULTRASOUND IMAGING | Facility: CLINIC | Age: 78
End: 2023-10-03
Payer: MEDICARE

## 2023-10-03 VITALS
SYSTOLIC BLOOD PRESSURE: 162 MMHG | TEMPERATURE: 98 F | DIASTOLIC BLOOD PRESSURE: 77 MMHG | HEIGHT: 71 IN | RESPIRATION RATE: 18 BRPM | HEART RATE: 76 BPM | OXYGEN SATURATION: 97 % | WEIGHT: 164.69 LBS

## 2023-10-03 DIAGNOSIS — Z98.49 CATARACT EXTRACTION STATUS, UNSPECIFIED EYE: Chronic | ICD-10-CM

## 2023-10-03 DIAGNOSIS — Z98.890 OTHER SPECIFIED POSTPROCEDURAL STATES: Chronic | ICD-10-CM

## 2023-10-03 DIAGNOSIS — M54.16 RADICULOPATHY, LUMBAR REGION: ICD-10-CM

## 2023-10-03 PROCEDURE — 93971 EXTREMITY STUDY: CPT | Mod: 26,RT

## 2023-10-03 PROCEDURE — 99283 EMERGENCY DEPT VISIT LOW MDM: CPT

## 2023-10-03 PROCEDURE — 99284 EMERGENCY DEPT VISIT MOD MDM: CPT

## 2023-10-03 RX ORDER — APIXABAN 2.5 MG/1
10 TABLET, FILM COATED ORAL ONCE
Refills: 0 | Status: COMPLETED | OUTPATIENT
Start: 2023-10-03 | End: 2023-10-03

## 2023-10-03 RX ORDER — APIXABAN 2.5 MG/1
1 TABLET, FILM COATED ORAL
Qty: 1 | Refills: 0
Start: 2023-10-03

## 2023-10-03 RX ADMIN — APIXABAN 10 MILLIGRAM(S): 2.5 TABLET, FILM COATED ORAL at 19:46

## 2023-10-03 NOTE — ED PROVIDER NOTE - PHYSICAL EXAMINATION
VITAL SIGNS: I have reviewed nursing notes and confirm.  CONSTITUTIONAL:  in no acute distress.  SKIN: Skin exam is warm and dry, no acute rash.  HEAD: Normocephalic; atraumatic.  EYES: PERRL, EOM intact; conjunctiva and sclera clear.  ENT: No nasal discharge; airway clear. Throat clear.  NECK: Supple; non tender.    CARD: Regular rate and rhythm.  RESP: No wheezes,  no rales or rhonchi.   ABD:  soft; non-distended; non-tender;   EXT: Normal ROM. No clubbing, cyanosis. (+) Mild RLE edema w/ no calf tenderness or erythema   NEURO: Alert, oriented. Grossly unremarkable. No focal deficits.  moves all extremities,  normal gait   PSYCH: Cooperative, appropriate.

## 2023-10-03 NOTE — CHART NOTE - NSCHARTNOTEFT_GEN_A_CORE
Patient known to neurosurgery service s/p L5-S1 PLIF 9/11/23 w/ Dr. Pollard, DMII, HTN, HLD, cardiac murmur, carotid stenosis, presents to ED after c/o LE pain, outpatient imaging found DVT. Patient 3 weeks post op, no neurosurgical contraindication of AC. Discussed w/ ED requesting Eliquis for treatment. Plan d/w Dr. Pollard. History of multiple DVT's, further workup per ED. For any new or worsening symptoms including but not limited to numbness, tingling, bleeding from site, bowel/bladder incontinence, saddle anesthesias, please alert outpatient office or return to ED if severe.      US Duplex Venous Lower Ext Ltd, Right (10.03.23 @ 15:30)  IMPRESSION:  Nonocclusive deep venous thrombus within the right popliteal vein

## 2023-10-03 NOTE — ED PROVIDER NOTE - CLINICAL SUMMARY MEDICAL DECISION MAKING FREE TEXT BOX
Patient is a 77y Male w/ PMHx HTN, HLD, DM2, CAD, & DVT who presents to ED w/ complaints of right DVT following US of RLE at [facility]. Patient s/p laminectomy complicated by SBO, discharged 9/18 & endorses second episode of unprovoked DVT. Moderate risk of PE, will order Eliquis x 3 mos.   Neurosurgery made aware, consulted for any contraindications for Eliquis- reccs appreciated.

## 2023-10-03 NOTE — ED ADULT TRIAGE NOTE - CHIEF COMPLAINT QUOTE
Pt sent from [facility], had ultrasound done today of RLE, + DVT behind knee, not on blood thinners, denies diff breathing

## 2023-10-03 NOTE — ED PROVIDER NOTE - OBJECTIVE STATEMENT
Patient is a 77y Male w/ PMHx HTN, HLD, DM2, CAD, & DVT who presents to ED w/ complaints of right DVT following US of RLE at [facility]. Patient s/p laminectomy complicated by SBO, discharged 9/18. Patient denies hemoptysis, pleuritic chest pain, calf tenderness, SOB, difficulty breathing, headache, numbness/tingling, or GI/ complaints. Patient stated yesterday his RLE was swollen but resolved after overnight elevation. Patient endorses previous history of RLE on DVT that required tx w/ blood thinners. Toileting

## 2023-10-03 NOTE — ED PROVIDER NOTE - PATIENT PORTAL LINK FT
You can access the FollowMyHealth Patient Portal offered by [facility] by registering at the following website: [email or web address]. By joining [facility]’s FollowMyHealth portal, you will also be able to view your health information using other applications (apps) compatible with our system.

## 2023-10-03 NOTE — ED PROVIDER NOTE - CARE PROVIDER_API CALL
Kenyatta Pollard  Neurosurgery  [address] [place] [id number]  Phone: [phone]  Fax: [phone]  Established Patient  Follow Up Time: Routine

## 2023-10-03 NOTE — ED PROVIDER NOTE - ATTENDING CONTRIBUTION TO CARE
77M with post operative DVT; d/w neurosurg as patient is one of Dr. Pollard's patients; they are aware of results, recommend AC per our discretion, states there are no contraindications from surgery. D/w patient, no hx GIB; no brain mass; he would not like any studies / labs, would like to leave hospital today; does not want obs or further workup. Discussed increased bleeding risk while on eliquis and to discontinue NSAIDs. Pt OK with this, will provide first dose of eliquis here, send starter pack, have follow up PMD / neurosurgery as needed, return precautions for any worsening.

## 2023-10-03 NOTE — ED ADULT NURSE NOTE - CHIEF COMPLAINT QUOTE
Pt sent from [facility], had ultrasound done today of RLE, + DVT behind knee, not on blood thinners, denies diff breathing

## 2023-10-03 NOTE — ED PROVIDER NOTE - NS ED ROS FT
CONSTITUTIONAL - no  fever, no diaphoresis, no weight change  SKIN - no rash  HEMATOLOGIC - no bleeding, no bruising  EYES - no eye pain, no blurred vision  ENT - no change in hearing, no pain  RESPIRATORY - no shortness of breath, no cough  CARDIAC - no chest pain, no palpitations  GI - no abd pain, no nausea, no vomiting, no diarrhea, no constipation, no bleeding  GENITO-URINARY - no discharge, no dysuria; no hematuria,   ENDO - no polydipsia, no polyuria, no heat/no cold intolerance  MUSCULOSKELETAL - no joint pain, (+)RLE swelling  NEUROLOGIC - no weakness, no headache, no anesthesia, no paresthesias  PSYCH - no anxiety, non suicidal, non homicidal, no hallucination, no depression

## 2023-10-03 NOTE — ED PROVIDER NOTE - NSFOLLOWUPINSTRUCTIONS_ED_ALL_ED_FT
Use the eliquis medication as directed on the labeling. 10 mg twice daily for the first week, then 5 mg twice daily for the following week.     This medication is an anti-coagulant and you can be more prone to bleeding after its use. Subsequently, if you fall and hit your head or have any other significant trauma you should be evaluated in the nearest emergency department.     Follow up with your primary doctor for repeat evaluation and to have any ongoing blood thinner medications ordered.    Follow up with your neurosurgery team as needed / scheduled.    Return here or go to the nearest emergency department for repeat evaluation if you develop any new symptoms of acute concern such as severe pain, if you have falls with bleeding, or any other new symptoms of acute concern.

## 2023-10-04 RX ORDER — APIXABAN 2.5 MG/1
1 TABLET, FILM COATED ORAL
Qty: 1 | Refills: 0
Start: 2023-10-04

## 2023-10-04 NOTE — CHART NOTE - NSCHARTNOTEFT_GEN_A_CORE
The patient had a small bubble in the arachnoid concerning for a small tear in the meninges with no notable extravasation of CSF or active leak.

## 2023-10-04 NOTE — ED POST DISCHARGE NOTE - REASON FOR FOLLOW-UP
Other Patient unable to fill Eliquis due to need for preauthorization.  Prescription was sent to [facility] pharmacy.  They will dispense a 1 month supply for free to the patient.  The patient was called and advised that he can  this prescription today.  He will follow-up with his primary care doctor within 48 hours for further instructions and preauthorization.

## 2023-10-09 ENCOUNTER — OFFICE (OUTPATIENT)
Dept: URBAN - METROPOLITAN AREA CLINIC 6 | Facility: CLINIC | Age: 78
Setting detail: OPHTHALMOLOGY
End: 2023-10-09
Payer: COMMERCIAL

## 2023-10-09 DIAGNOSIS — H02.834: ICD-10-CM

## 2023-10-09 DIAGNOSIS — H11.152: ICD-10-CM

## 2023-10-09 DIAGNOSIS — E11.9: ICD-10-CM

## 2023-10-09 DIAGNOSIS — Z96.1: ICD-10-CM

## 2023-10-09 DIAGNOSIS — H25.13: ICD-10-CM

## 2023-10-09 DIAGNOSIS — H01.001: ICD-10-CM

## 2023-10-09 DIAGNOSIS — H02.831: ICD-10-CM

## 2023-10-09 DIAGNOSIS — H01.004: ICD-10-CM

## 2023-10-09 PROCEDURE — 92014 COMPRE OPH EXAM EST PT 1/>: CPT

## 2023-10-09 ASSESSMENT — SPHEQUIV_DERIVED
OD_SPHEQUIV: 0.5
OS_SPHEQUIV: 1.125
OS_SPHEQUIV: 0.375
OD_SPHEQUIV: 0.25

## 2023-10-09 ASSESSMENT — REFRACTION_CURRENTRX
OD_ADD: +2.50
OD_OVR_VA: 20/
OS_VPRISM_DIRECTION: BF
OS_OVR_VA: 20/
OD_CYLINDER: -0.50
OD_VPRISM_DIRECTION: BF
OD_SPHERE: +0.50
OS_SPHERE: +1.00
OS_CYLINDER: -0.50
OS_ADD: +2.50
OD_AXIS: 090
OS_AXIS: 060

## 2023-10-09 ASSESSMENT — REFRACTION_MANIFEST
OS_CYLINDER: -0.75
OD_ADD: +2.50
OS_ADD: +2.50
OU_VA: 20/20-2
OD_CYLINDER: -0.50
OS_VA1: 20/20
OS_CYLINDER: SPH
OD_VA1: 20/20
OD_SPHERE: PLANO
OS_VA1: 20/20
OS_ADD: +2.50
OS_SPHERE: PLANO
OD_VA1: 20/20
OD_AXIS: 110
OS_AXIS: 170
OS_SPHERE: +1.50
OD_ADD: +2.50
OD_CYLINDER: SPH
OD_SPHERE: +0.50

## 2023-10-09 ASSESSMENT — VISUAL ACUITY
OD_BCVA: 20/20-1
OS_BCVA: 20/20-1

## 2023-10-09 ASSESSMENT — REFRACTION_AUTOREFRACTION
OS_AXIS: 131
OD_AXIS: 087
OD_CYLINDER: -0.50
OS_CYLINDER: -0.25
OD_SPHERE: +0.75
OS_SPHERE: +0.50

## 2023-10-09 ASSESSMENT — KERATOMETRY
OD_K1POWER_DIOPTERS: ERROR
METHOD_AUTO_MANUAL: AUTO
OS_K2POWER_DIOPTERS: 43.50
OS_AXISANGLE_DEGREES: 079
OS_K1POWER_DIOPTERS: 43.00

## 2023-10-09 ASSESSMENT — CONFRONTATIONAL VISUAL FIELD TEST (CVF)
OS_FINDINGS: FULL
OD_FINDINGS: FULL

## 2023-10-09 ASSESSMENT — AXIALLENGTH_DERIVED
OS_AL: 23.5378
OS_AL: 23.2504

## 2023-10-09 ASSESSMENT — LID POSITION - DERMATOCHALASIS
OS_DERMATOCHALASIS: LUL 1+
OD_DERMATOCHALASIS: RUL 1+

## 2023-10-09 ASSESSMENT — LID EXAM ASSESSMENTS
OD_BLEPHARITIS: RLL RUL
OS_BLEPHARITIS: LLL LUL

## 2023-10-09 ASSESSMENT — TONOMETRY
OS_IOP_MMHG: 11
OD_IOP_MMHG: 13

## 2023-10-10 ENCOUNTER — NON-APPOINTMENT (OUTPATIENT)
Age: 78
End: 2023-10-10

## 2023-10-11 ENCOUNTER — APPOINTMENT (OUTPATIENT)
Dept: VASCULAR SURGERY | Facility: CLINIC | Age: 78
End: 2023-10-11
Payer: COMMERCIAL

## 2023-10-11 ENCOUNTER — APPOINTMENT (OUTPATIENT)
Dept: VASCULAR SURGERY | Facility: CLINIC | Age: 78
End: 2023-10-11
Payer: MEDICARE

## 2023-10-11 VITALS
OXYGEN SATURATION: 97 % | RESPIRATION RATE: 16 BRPM | BODY MASS INDEX: 23.24 KG/M2 | HEART RATE: 74 BPM | HEIGHT: 71 IN | WEIGHT: 166 LBS | DIASTOLIC BLOOD PRESSURE: 60 MMHG | SYSTOLIC BLOOD PRESSURE: 150 MMHG

## 2023-10-11 DIAGNOSIS — I82.409 ACUTE EMBOLISM AND THROMBOSIS OF UNSPECIFIED DEEP VEINS OF UNSPECIFIED LOWER EXTREMITY: ICD-10-CM

## 2023-10-11 PROCEDURE — 99202 OFFICE O/P NEW SF 15 MIN: CPT

## 2023-10-11 PROCEDURE — 93971 EXTREMITY STUDY: CPT | Mod: RT

## 2023-10-14 ENCOUNTER — RESULT REVIEW (OUTPATIENT)
Age: 78
End: 2023-10-14

## 2023-10-20 ENCOUNTER — APPOINTMENT (OUTPATIENT)
Dept: VASCULAR SURGERY | Facility: CLINIC | Age: 78
End: 2023-10-20

## 2023-10-23 ENCOUNTER — OUTPATIENT (OUTPATIENT)
Dept: OUTPATIENT SERVICES | Facility: HOSPITAL | Age: 78
LOS: 1 days | End: 2023-10-23
Payer: MEDICARE

## 2023-10-23 DIAGNOSIS — Z98.890 OTHER SPECIFIED POSTPROCEDURAL STATES: Chronic | ICD-10-CM

## 2023-10-23 DIAGNOSIS — Z98.49 CATARACT EXTRACTION STATUS, UNSPECIFIED EYE: Chronic | ICD-10-CM

## 2023-10-23 DIAGNOSIS — M54.16 RADICULOPATHY, LUMBAR REGION: ICD-10-CM

## 2023-10-23 PROCEDURE — 72100 X-RAY EXAM L-S SPINE 2/3 VWS: CPT | Mod: 26

## 2023-10-24 ENCOUNTER — APPOINTMENT (OUTPATIENT)
Dept: NEUROSURGERY | Facility: CLINIC | Age: 78
End: 2023-10-24
Payer: COMMERCIAL

## 2023-10-24 VITALS
WEIGHT: 166 LBS | HEART RATE: 66 BPM | BODY MASS INDEX: 23.24 KG/M2 | TEMPERATURE: 98.3 F | SYSTOLIC BLOOD PRESSURE: 124 MMHG | HEIGHT: 71 IN | OXYGEN SATURATION: 98 % | DIASTOLIC BLOOD PRESSURE: 58 MMHG

## 2023-10-24 PROCEDURE — 99024 POSTOP FOLLOW-UP VISIT: CPT

## 2023-11-04 ENCOUNTER — RESULT REVIEW (OUTPATIENT)
Age: 78
End: 2023-11-04

## 2023-11-06 ENCOUNTER — OFFICE (OUTPATIENT)
Dept: URBAN - METROPOLITAN AREA CLINIC 6 | Facility: CLINIC | Age: 78
Setting detail: OPHTHALMOLOGY
End: 2023-11-06
Payer: COMMERCIAL

## 2023-11-06 ENCOUNTER — RX ONLY (RX ONLY)
Age: 78
End: 2023-11-06

## 2023-11-06 DIAGNOSIS — S05.02XD: ICD-10-CM

## 2023-11-06 PROBLEM — H01.004 BLEPHARITIS; RIGHT UPPER LID, LEFT UPPER LID: Status: ACTIVE | Noted: 2023-10-09

## 2023-11-06 PROBLEM — E11.9 DIABETES TYPE 2 NO RETINOPATHY ; BOTH EYES: Status: ACTIVE | Noted: 2023-10-09

## 2023-11-06 PROBLEM — H01.001 BLEPHARITIS; RIGHT UPPER LID, LEFT UPPER LID: Status: ACTIVE | Noted: 2023-10-09

## 2023-11-06 PROCEDURE — 99213 OFFICE O/P EST LOW 20 MIN: CPT

## 2023-11-06 ASSESSMENT — CORNEAL TRAUMA - ABRASION: OS_ABRASION: ABSENT

## 2023-11-06 ASSESSMENT — REFRACTION_MANIFEST
OS_SPHERE: PLANO
OS_ADD: +2.50
OD_SPHERE: PLANO
OD_VA1: 20/20
OS_ADD: +2.50
OS_CYLINDER: -0.75
OS_VA1: 20/20
OD_CYLINDER: -0.50
OS_SPHERE: +1.50
OS_CYLINDER: SPH
OD_ADD: +2.50
OU_VA: 20/20-2
OD_CYLINDER: SPH
OD_SPHERE: +0.50
OD_AXIS: 110
OS_VA1: 20/20
OD_ADD: +2.50
OS_AXIS: 170
OD_VA1: 20/20

## 2023-11-06 ASSESSMENT — REFRACTION_CURRENTRX
OD_SPHERE: +0.50
OD_VPRISM_DIRECTION: BF
OD_ADD: +2.50
OS_CYLINDER: -0.50
OS_AXIS: 060
OS_VPRISM_DIRECTION: BF
OS_SPHERE: +1.00
OD_CYLINDER: -0.50
OS_OVR_VA: 20/
OD_OVR_VA: 20/
OS_ADD: +2.50
OD_AXIS: 090

## 2023-11-06 ASSESSMENT — SPHEQUIV_DERIVED
OS_SPHEQUIV: 1.125
OD_SPHEQUIV: 0.5
OD_SPHEQUIV: 0.25
OS_SPHEQUIV: 0.375

## 2023-11-06 ASSESSMENT — LID EXAM ASSESSMENTS
OS_BLEPHARITIS: LLL LUL
OD_BLEPHARITIS: RLL RUL

## 2023-11-06 ASSESSMENT — LID POSITION - DERMATOCHALASIS
OS_DERMATOCHALASIS: LUL 1+
OD_DERMATOCHALASIS: RUL 1+

## 2023-11-06 ASSESSMENT — REFRACTION_AUTOREFRACTION
OS_SPHERE: +0.50
OD_CYLINDER: -0.50
OD_AXIS: 087
OS_AXIS: 131
OS_CYLINDER: -0.25
OD_SPHERE: +0.75

## 2023-11-06 ASSESSMENT — CONFRONTATIONAL VISUAL FIELD TEST (CVF)
OS_FINDINGS: FULL
OD_FINDINGS: FULL

## 2023-11-15 ENCOUNTER — APPOINTMENT (OUTPATIENT)
Dept: VASCULAR SURGERY | Facility: CLINIC | Age: 78
End: 2023-11-15
Payer: MEDICARE

## 2023-11-15 PROCEDURE — 93971 EXTREMITY STUDY: CPT | Mod: RT

## 2023-11-15 PROCEDURE — 99212 OFFICE O/P EST SF 10 MIN: CPT

## 2023-12-06 ENCOUNTER — OUTPATIENT (OUTPATIENT)
Dept: OUTPATIENT SERVICES | Facility: HOSPITAL | Age: 78
LOS: 1 days | End: 2023-12-06
Payer: COMMERCIAL

## 2023-12-06 DIAGNOSIS — Z98.49 CATARACT EXTRACTION STATUS, UNSPECIFIED EYE: Chronic | ICD-10-CM

## 2023-12-06 DIAGNOSIS — M54.16 RADICULOPATHY, LUMBAR REGION: ICD-10-CM

## 2023-12-06 DIAGNOSIS — Z98.890 OTHER SPECIFIED POSTPROCEDURAL STATES: Chronic | ICD-10-CM

## 2023-12-06 PROCEDURE — 72100 X-RAY EXAM L-S SPINE 2/3 VWS: CPT | Mod: 26

## 2023-12-07 ENCOUNTER — OFFICE (OUTPATIENT)
Dept: URBAN - METROPOLITAN AREA CLINIC 6 | Facility: CLINIC | Age: 78
Setting detail: OPHTHALMOLOGY
End: 2023-12-07
Payer: COMMERCIAL

## 2023-12-07 DIAGNOSIS — H01.005: ICD-10-CM

## 2023-12-07 DIAGNOSIS — H01.001: ICD-10-CM

## 2023-12-07 DIAGNOSIS — H16.223: ICD-10-CM

## 2023-12-07 DIAGNOSIS — B88.0: ICD-10-CM

## 2023-12-07 DIAGNOSIS — H11.152: ICD-10-CM

## 2023-12-07 DIAGNOSIS — Z96.1: ICD-10-CM

## 2023-12-07 DIAGNOSIS — H01.002: ICD-10-CM

## 2023-12-07 DIAGNOSIS — H01.004: ICD-10-CM

## 2023-12-07 PROCEDURE — 99213 OFFICE O/P EST LOW 20 MIN: CPT

## 2023-12-07 ASSESSMENT — REFRACTION_MANIFEST
OD_VA1: 20/20
OD_AXIS: 110
OS_SPHERE: +1.50
OD_VA1: 20/20
OD_ADD: +2.50
OD_ADD: +2.50
OS_AXIS: 170
OU_VA: 20/20-2
OS_ADD: +2.50
OS_VA1: 20/20
OS_CYLINDER: -0.75
OS_ADD: +2.50
OD_SPHERE: PLANO
OS_CYLINDER: SPH
OD_SPHERE: +0.50
OD_CYLINDER: SPH
OS_SPHERE: PLANO
OS_VA1: 20/20
OD_CYLINDER: -0.50

## 2023-12-07 ASSESSMENT — SPHEQUIV_DERIVED
OS_SPHEQUIV: 0.5
OD_SPHEQUIV: 0.25
OD_SPHEQUIV: 0.375
OS_SPHEQUIV: 1.125

## 2023-12-07 ASSESSMENT — REFRACTION_CURRENTRX
OD_OVR_VA: 20/
OS_CYLINDER: -0.50
OS_OVR_VA: 20/
OS_SPHERE: +1.00
OS_AXIS: 060
OD_VPRISM_DIRECTION: BF
OD_SPHERE: +0.50
OD_ADD: +2.50
OS_ADD: +2.50
OD_CYLINDER: -0.50
OD_AXIS: 090
OS_VPRISM_DIRECTION: BF

## 2023-12-07 ASSESSMENT — CONFRONTATIONAL VISUAL FIELD TEST (CVF)
OD_FINDINGS: FULL
OS_FINDINGS: FULL

## 2023-12-07 ASSESSMENT — REFRACTION_AUTOREFRACTION
OS_CYLINDER: -0.50
OD_AXIS: 089
OD_SPHERE: +0.75
OS_AXIS: 141
OS_SPHERE: +0.75
OD_CYLINDER: -0.75

## 2023-12-07 ASSESSMENT — SUPERFICIAL PUNCTATE KERATITIS (SPK)
OD_SPK: T
OS_SPK: T

## 2023-12-07 ASSESSMENT — LID EXAM ASSESSMENTS
OS_COMMENTS: COLLARETTES UL COMMENTS
OS_BLEPHARITIS: LLL LUL
OD_COMMENTS: COLLARETTES UL COMMENTS
OD_BLEPHARITIS: RLL RUL

## 2023-12-07 ASSESSMENT — LID POSITION - DERMATOCHALASIS
OS_DERMATOCHALASIS: LUL 1+
OD_DERMATOCHALASIS: RUL 1+

## 2023-12-07 ASSESSMENT — CORNEAL TRAUMA - ABRASION: OS_ABRASION: ABSENT

## 2023-12-12 ENCOUNTER — APPOINTMENT (OUTPATIENT)
Dept: NEUROSURGERY | Facility: CLINIC | Age: 78
End: 2023-12-12
Payer: COMMERCIAL

## 2023-12-12 DIAGNOSIS — M54.16 RADICULOPATHY, LUMBAR REGION: ICD-10-CM

## 2023-12-12 PROCEDURE — 99213 OFFICE O/P EST LOW 20 MIN: CPT

## 2024-01-03 ENCOUNTER — RX ONLY (RX ONLY)
Age: 79
End: 2024-01-03

## 2024-01-03 ENCOUNTER — OFFICE (OUTPATIENT)
Dept: URBAN - METROPOLITAN AREA CLINIC 104 | Facility: CLINIC | Age: 79
Setting detail: OPHTHALMOLOGY
End: 2024-01-03
Payer: COMMERCIAL

## 2024-01-03 DIAGNOSIS — H01.002: ICD-10-CM

## 2024-01-03 DIAGNOSIS — H01.001: ICD-10-CM

## 2024-01-03 DIAGNOSIS — H01.004: ICD-10-CM

## 2024-01-03 DIAGNOSIS — H16.223: ICD-10-CM

## 2024-01-03 DIAGNOSIS — H01.005: ICD-10-CM

## 2024-01-03 DIAGNOSIS — H11.152: ICD-10-CM

## 2024-01-03 DIAGNOSIS — Z96.1: ICD-10-CM

## 2024-01-03 PROCEDURE — 99213 OFFICE O/P EST LOW 20 MIN: CPT | Performed by: OPTOMETRIST

## 2024-01-03 ASSESSMENT — SPHEQUIV_DERIVED
OD_SPHEQUIV: 0.375
OS_SPHEQUIV: 0.5

## 2024-01-03 ASSESSMENT — REFRACTION_AUTOREFRACTION
OD_SPHERE: +0.75
OS_AXIS: 141
OS_CYLINDER: -0.50
OD_CYLINDER: -0.75
OD_AXIS: 089
OS_SPHERE: +0.75

## 2024-01-03 ASSESSMENT — LID POSITION - DERMATOCHALASIS
OS_DERMATOCHALASIS: LUL 1+
OD_DERMATOCHALASIS: RUL 1+

## 2024-01-03 ASSESSMENT — CORNEAL TRAUMA - ABRASION: OS_ABRASION: ABSENT

## 2024-01-03 ASSESSMENT — LID EXAM ASSESSMENTS
OS_BLEPHARITIS: LLL LUL
OD_BLEPHARITIS: RLL RUL

## 2024-01-03 ASSESSMENT — SUPERFICIAL PUNCTATE KERATITIS (SPK)
OD_SPK: 1+ 2+
OS_SPK: 2+

## 2024-01-03 ASSESSMENT — REFRACTION_CURRENTRX
OD_VPRISM_DIRECTION: BF
OD_SPHERE: +0.50
OD_AXIS: 090
OS_AXIS: 060
OS_SPHERE: +1.00
OS_OVR_VA: 20/
OD_CYLINDER: -0.50
OD_OVR_VA: 20/
OS_ADD: +2.50
OS_CYLINDER: -0.50
OD_ADD: +2.50
OS_VPRISM_DIRECTION: BF

## 2024-01-03 ASSESSMENT — CONFRONTATIONAL VISUAL FIELD TEST (CVF)
OD_FINDINGS: FULL
OS_FINDINGS: FULL

## 2024-01-08 ENCOUNTER — APPOINTMENT (OUTPATIENT)
Dept: NEUROSURGERY | Facility: CLINIC | Age: 79
End: 2024-01-08
Payer: COMMERCIAL

## 2024-01-08 ENCOUNTER — OUTPATIENT (OUTPATIENT)
Dept: OUTPATIENT SERVICES | Facility: HOSPITAL | Age: 79
LOS: 1 days | End: 2024-01-08
Payer: MEDICARE

## 2024-01-08 ENCOUNTER — RESULT REVIEW (OUTPATIENT)
Age: 79
End: 2024-01-08

## 2024-01-08 VITALS
BODY MASS INDEX: 23.8 KG/M2 | OXYGEN SATURATION: 98 % | HEIGHT: 71 IN | WEIGHT: 170 LBS | TEMPERATURE: 98.6 F | HEART RATE: 58 BPM | DIASTOLIC BLOOD PRESSURE: 65 MMHG | SYSTOLIC BLOOD PRESSURE: 134 MMHG

## 2024-01-08 DIAGNOSIS — Z98.890 OTHER SPECIFIED POSTPROCEDURAL STATES: ICD-10-CM

## 2024-01-08 DIAGNOSIS — Z98.890 OTHER SPECIFIED POSTPROCEDURAL STATES: Chronic | ICD-10-CM

## 2024-01-08 DIAGNOSIS — Z98.49 CATARACT EXTRACTION STATUS, UNSPECIFIED EYE: Chronic | ICD-10-CM

## 2024-01-08 PROCEDURE — 72100 X-RAY EXAM L-S SPINE 2/3 VWS: CPT | Mod: 26

## 2024-01-08 PROCEDURE — 99212 OFFICE O/P EST SF 10 MIN: CPT

## 2024-01-08 NOTE — DATA REVIEWED
[de-identified] : EXAM: [id number] - XR LS SPINE AP LAT 2-3 VIEWS  - ORDERED BY: ANTELMO MARTINEZ   PROCEDURE DATE:  12/06/2023    INTERPRETATION:  CLINICAL INDICATION: Status post fusion  EXAM: AP and lateral views of the lumbar spine  COMPARISON: 10/23/2023   IMPRESSION: Preservation of body heights. Status post decompression and instrumented fusion at L5/S1 with interconnecting rods and interbody device. Hardware is intact. Unchanged anterolisthesis of L5 on S1. Vascular calcifications.  --- End of Report ---

## 2024-01-08 NOTE — PHYSICAL EXAM
[FreeTextEntry1] : awake, alert interactive, appropriate RUE 5/5 D/B/T/WE/WF//IO LUE 5/5 D/B/T/WE/WF//IO RLE 5/5 HF/KE/KF/DF/PF/EHL LLE 5/5 HF/KE/KF/DF/PF/EHL.

## 2024-01-08 NOTE — PHYSICAL EXAM
[de-identified] : awake, alert interactive, appropriate RLE 5/5 HF/KE/KF/DF/PF/EHL LLE 5/5 HF/KE/KF/DF/PF/EHL SILT incision well healed narrow-based gait wnl reflexes 2+ [de-identified] : XR L-spine AP and lateral obtained on 1/8/2023 demonstrates expected postoperative changes from L5-S1 interbody fusion without any evidence of hardware migration, screw pullout, or hardware failure

## 2024-01-08 NOTE — ASSESSMENT
[FreeTextEntry1] : Mr. Whitehead is a very pleasant 78-year-old gentleman who is now approximately 4 months status post L5-S1 decompression and interbody fusion and was overall doing well at her 3-month visit but recently in the setting of liberalizing his activities has had some mildly worsening low back pain and occasional left lower extremity radiculopathy.  On x-ray there is no evidence of any hardware failure or screw pullout.  We discussed that during the recovery and rehabilitation, especially in the setting of increasing activity to rebuild and strengthen muscle, having intermittent back pain and intermittent radiculopathy is not unexpected but is expected to completely resolve over time.  We discussed that he would benefit likely from a trial of physical therapy.  Discussed that otherwise his progress is very encouraging and we can see him at the 6-month postoperative anastacia in approximately 2 months with a repeat XR L-spine AP and lateral.  All questions answered.  Plan: - Referral to physical therapy - Follow-up XR L-spine AP and lateral in March - Follow-up in 2 months

## 2024-01-08 NOTE — HISTORY OF PRESENT ILLNESS
[de-identified] : Mr. Whitehead is a very pleasant 78-year-old gentleman who underwent an L5-S1 decompression and interbody fusion on 9/11/2023 who presents now for evaluation of some mild to moderately worsening lower back pain and occasional radiculopathy down the left leg in the setting of increasing his activity.  He reports that he was overall doing well until recently over the winter break started to have some lower back pain and occasional left lower extremity radiculopathy.  He denies any symptoms in the right leg.  He reports that he had initially been doing very well around the time of our last visit.  He denies any valerio weakness or any numbness or tingling or any falls.  She also denies any bowel or bladder incontinence or retention or any saddle anesthesia

## 2024-01-08 NOTE — HISTORY OF PRESENT ILLNESS
[FreeTextEntry1] : Mr. Whitehead presents for post op check, 6 week post L5 S1 decompression and fusion on 9/11/2023. Patient initially underwent an L5-S1 hemilaminectomy and diskectomy in 2019 with Dr. Amaya at [facility] which subsequently required a revision surgery 6 months later. He did report some mild improvement of his symptoms after his initial two surgeries which consisted mostly of left lower extremity radiculopathy. He now reports significant pain in his lower back as well as numbness and tingling that shoots down the entire left leg to his left foot. He reports that the back pain was currently significantly worse than the leg pain and that the leg pain largely felt as it did prior to his previous surgeries. He notably stated his back pain was a 10/10 and significantly inhibited his ability to walk more than a few blocks due to associated pain. At the time of presentation, he had trialed radiofrequency ablation and pain management injections and has been unable to actively participate in physical therapy due to the pain. MRI lumbar spine without contrast and CT lumbar spine without contrast demonstrated previous left-sided L5-S1 laminotomy with adjacent spondylolysis and fracture of the pars with grade 1 anterolisthesis of L5 on S1 with resultant left greater than right L5 foraminal stenosis and nerve root compression.  Today, patient presents for post operative check. He reports low back pain in lumbar region and pain in left lower extremity radiating down the left leg to foot is very much improving. He remains with numbness and tingling in toes and states this is due to diabetic neuropathy. He is ambulating well with a cane. He reports performing ADL's at home without issues.  Patient has not started PT. He swims at [employer] and performs his own exercises.  Plan: PT

## 2024-01-17 ENCOUNTER — OFFICE (OUTPATIENT)
Dept: URBAN - METROPOLITAN AREA CLINIC 104 | Facility: CLINIC | Age: 79
Setting detail: OPHTHALMOLOGY
End: 2024-01-17
Payer: COMMERCIAL

## 2024-01-17 DIAGNOSIS — H01.002: ICD-10-CM

## 2024-01-17 DIAGNOSIS — H01.004: ICD-10-CM

## 2024-01-17 DIAGNOSIS — H01.005: ICD-10-CM

## 2024-01-17 DIAGNOSIS — H01.001: ICD-10-CM

## 2024-01-17 DIAGNOSIS — H16.221: ICD-10-CM

## 2024-01-17 DIAGNOSIS — H11.152: ICD-10-CM

## 2024-01-17 PROCEDURE — 92012 INTRM OPH EXAM EST PATIENT: CPT | Performed by: OPTOMETRIST

## 2024-01-17 ASSESSMENT — SPHEQUIV_DERIVED
OS_SPHEQUIV: 0.75
OD_SPHEQUIV: 0.625

## 2024-01-17 ASSESSMENT — REFRACTION_AUTOREFRACTION
OS_CYLINDER: -0.50
OS_AXIS: 131
OD_CYLINDER: -0.75
OD_SPHERE: +1.00
OD_AXIS: 085
OS_SPHERE: +1.00

## 2024-01-17 ASSESSMENT — CORNEAL TRAUMA - ABRASION: OS_ABRASION: ABSENT

## 2024-01-17 ASSESSMENT — SUPERFICIAL PUNCTATE KERATITIS (SPK)
OS_SPK: ABSENT
OD_SPK: 1+ 2+

## 2024-01-17 ASSESSMENT — LID EXAM ASSESSMENTS
OD_BLEPHARITIS: RLL RUL
OS_BLEPHARITIS: LLL LUL

## 2024-01-17 ASSESSMENT — LID POSITION - DERMATOCHALASIS
OD_DERMATOCHALASIS: RUL 1+
OS_DERMATOCHALASIS: LUL 1+

## 2024-01-17 ASSESSMENT — CONFRONTATIONAL VISUAL FIELD TEST (CVF)
OS_FINDINGS: FULL
OD_FINDINGS: FULL

## 2024-01-17 ASSESSMENT — REFRACTION_CURRENTRX
OS_OVR_VA: 20/
OD_OVR_VA: 20/

## 2024-01-25 ENCOUNTER — APPOINTMENT (OUTPATIENT)
Dept: ORTHOPEDIC SURGERY | Facility: CLINIC | Age: 79
End: 2024-01-25
Payer: COMMERCIAL

## 2024-01-25 DIAGNOSIS — M25.561 PAIN IN RIGHT KNEE: ICD-10-CM

## 2024-01-25 DIAGNOSIS — M25.562 PAIN IN RIGHT KNEE: ICD-10-CM

## 2024-01-25 PROCEDURE — 20610 DRAIN/INJ JOINT/BURSA W/O US: CPT | Mod: 50

## 2024-02-02 ENCOUNTER — APPOINTMENT (OUTPATIENT)
Dept: ORTHOPEDIC SURGERY | Facility: CLINIC | Age: 79
End: 2024-02-02
Payer: COMMERCIAL

## 2024-02-02 VITALS — HEIGHT: 71 IN | BODY MASS INDEX: 23.8 KG/M2 | WEIGHT: 170 LBS

## 2024-02-02 PROCEDURE — 20610 DRAIN/INJ JOINT/BURSA W/O US: CPT | Mod: 50

## 2024-02-02 NOTE — HISTORY OF PRESENT ILLNESS
[de-identified] : The patient is here today for the second Genvisc injection for the bilateral knees.  Allergies: The patient denies allergies to medications and has no allergies to chicken,eggs, or feathers. Procedure: The patient has been identified by name and date of birth. Patient confirms that we are treating the bilateral knees today. The knees were prepped in the usual sterile fashion. The areas were cleansed with chlorhexadine, then sprayed with ethyl chloride. The patient was then injected with the Genvisc into the bilateral knees in the anterior position. The patient tolerated the procedure well. The medication was delivered aseptically and atraumatically. Diagnosis: Osteoarthritis of the bilateral knees Treatment: The patient was advised on the activities for today. I gave the patient instructions on postinjection ice and analgesia. Follow up recommended in one week

## 2024-02-02 NOTE — REASON FOR VISIT
[Follow-Up Visit] : a follow-up visit for [Other: ____] : [unfilled] [FreeTextEntry2] :  Bilateral genvisc injection #2. Lot number: [id number]. Exp: 5/31/25.

## 2024-02-09 ENCOUNTER — APPOINTMENT (OUTPATIENT)
Dept: ORTHOPEDIC SURGERY | Facility: CLINIC | Age: 79
End: 2024-02-09
Payer: COMMERCIAL

## 2024-02-09 VITALS — BODY MASS INDEX: 23.8 KG/M2 | HEIGHT: 71 IN | WEIGHT: 170 LBS

## 2024-02-09 PROCEDURE — 20610 DRAIN/INJ JOINT/BURSA W/O US: CPT | Mod: 50

## 2024-02-09 NOTE — REASON FOR VISIT
[Follow-Up Visit] : a follow-up visit for [Other: ____] : [unfilled] [FreeTextEntry2] : Bilateral genvisc injection #3. Lot number: [id number]. Exp: 5/31/25.

## 2024-02-09 NOTE — HISTORY OF PRESENT ILLNESS
[de-identified] : The patient is here today for the third Genvisc injection for the bilateral knees.  Allergies: The patient denies allergies to medications and has no allergies to chicken,eggs, or feathers. Procedure: The patient has been identified by name and date of birth. Patient confirms that we are treating the bilateral knees today. The knees were prepped in the usual sterile fashion. The areas were cleansed with chlorhexadine, then sprayed with ethyl chloride. The patient was then injected with the Genvisc into the bilateral knees in the anterior position. The patient tolerated the procedure well. The medication was delivered aseptically and atraumatically. Diagnosis: Osteoarthritis of the bilateral knees Treatment: The patient was advised on the activities for today. I gave the patient instructions on postinjection ice and analgesia. Follow up recommended in one week

## 2024-02-16 ENCOUNTER — APPOINTMENT (OUTPATIENT)
Dept: ORTHOPEDIC SURGERY | Facility: CLINIC | Age: 79
End: 2024-02-16
Payer: COMMERCIAL

## 2024-02-16 VITALS — BODY MASS INDEX: 23.8 KG/M2 | HEIGHT: 71 IN | WEIGHT: 170 LBS

## 2024-02-16 PROCEDURE — 20610 DRAIN/INJ JOINT/BURSA W/O US: CPT | Mod: 50

## 2024-02-16 NOTE — REASON FOR VISIT
[Follow-Up Visit] : a follow-up visit for [Other: ____] : [unfilled] [FreeTextEntry2] :  Bilateral genvisc injection #4. Lot number: [id number]. Exp: 5/31/25.

## 2024-02-16 NOTE — HISTORY OF PRESENT ILLNESS
[de-identified] : The patient is here today for the fourth Genvisc injection for the bilateral knees.  Allergies: The patient denies allergies to medications and has no allergies to chicken,eggs, or feathers. Procedure: The patient has been identified by name and date of birth. Patient confirms that we are treating the bilateral knees today. The knees were prepped in the usual sterile fashion. The areas were cleansed with chlorhexadine, then sprayed with ethyl chloride. The patient was then injected with the Genvisc into the bilateral knees in the anterior position. The patient tolerated the procedure well. The medication was delivered aseptically and atraumatically. Diagnosis: Osteoarthritis of the bilateral knees Treatment: The patient was advised on the activities for today. I gave the patient instructions on postinjection ice and analgesia. Follow up recommended in one week

## 2024-02-22 ENCOUNTER — APPOINTMENT (OUTPATIENT)
Dept: CARDIOLOGY | Facility: CLINIC | Age: 79
End: 2024-02-22
Payer: MEDICARE

## 2024-02-22 VITALS
WEIGHT: 164 LBS | HEART RATE: 61 BPM | SYSTOLIC BLOOD PRESSURE: 126 MMHG | DIASTOLIC BLOOD PRESSURE: 68 MMHG | BODY MASS INDEX: 22.96 KG/M2 | HEIGHT: 71 IN | RESPIRATION RATE: 16 BRPM

## 2024-02-22 DIAGNOSIS — Z86.39 PERSONAL HISTORY OF OTHER ENDOCRINE, NUTRITIONAL AND METABOLIC DISEASE: ICD-10-CM

## 2024-02-22 DIAGNOSIS — R01.1 CARDIAC MURMUR, UNSPECIFIED: ICD-10-CM

## 2024-02-22 DIAGNOSIS — I10 ESSENTIAL (PRIMARY) HYPERTENSION: ICD-10-CM

## 2024-02-22 DIAGNOSIS — I35.0 NONRHEUMATIC AORTIC (VALVE) STENOSIS: ICD-10-CM

## 2024-02-22 DIAGNOSIS — E78.00 PURE HYPERCHOLESTEROLEMIA, UNSPECIFIED: ICD-10-CM

## 2024-02-22 PROCEDURE — 99214 OFFICE O/P EST MOD 30 MIN: CPT

## 2024-02-22 PROCEDURE — 93000 ELECTROCARDIOGRAM COMPLETE: CPT

## 2024-02-22 PROCEDURE — G2211 COMPLEX E/M VISIT ADD ON: CPT

## 2024-02-22 RX ORDER — ROSUVASTATIN CALCIUM 10 MG/1
10 TABLET, FILM COATED ORAL
Qty: 90 | Refills: 3 | Status: ACTIVE | COMMUNITY
Start: 2022-01-10 | End: 1900-01-01

## 2024-02-22 RX ORDER — HYALURONATE SODIUM 10 MG/ML
25 SYRINGE (ML) INTRAARTICULAR
Qty: 2 | Refills: 0 | Status: DISCONTINUED | OUTPATIENT
Start: 2023-11-29 | End: 2024-02-22

## 2024-02-22 RX ORDER — HYALURONATE SODIUM 10 MG/ML
25 SYRINGE (ML) INTRAARTICULAR
Qty: 2 | Refills: 0 | Status: DISCONTINUED | OUTPATIENT
Start: 2023-08-30 | End: 2024-02-22

## 2024-02-22 RX ORDER — LOSARTAN POTASSIUM AND HYDROCHLOROTHIAZIDE 12.5; 1 MG/1; MG/1
100-12.5 TABLET ORAL
Qty: 90 | Refills: 3 | Status: ACTIVE | COMMUNITY
Start: 2022-02-15 | End: 1900-01-01

## 2024-02-22 NOTE — REVIEW OF SYSTEMS
[Joint Pain] : joint pain [Joint Stiffness] : joint stiffness [Knee Problem] : knee problems [Knee Pain] : knee pain [Lower Back Pain] : lower back pain [Numbness (Hypoesthesia)] : numbness [Tingling (Paresthesia)] : tingling [Negative] : Psychiatric

## 2024-02-22 NOTE — PHYSICAL EXAM
[Well Developed] : well developed [Well Nourished] : well nourished [No Acute Distress] : no acute distress [Normal Conjunctiva] : normal conjunctiva [Normal Venous Pressure] : normal venous pressure [No Carotid Bruit] : no carotid bruit [Normal S1, S2] : normal S1, S2 [No Rub] : no rub [No Gallop] : no gallop [Murmur] : murmur [Clear Lung Fields] : clear lung fields [Good Air Entry] : good air entry [Soft] : abdomen soft [Non Tender] : non-tender [No Respiratory Distress] : no respiratory distress  [No Masses/organomegaly] : no masses/organomegaly [Normal Gait] : normal gait [No Cyanosis] : no cyanosis [No Clubbing] : no clubbing [No Skin Lesions] : no skin lesions [No Rash] : no rash [Moves all extremities] : moves all extremities [No Focal Deficits] : no focal deficits [Normal Speech] : normal speech [Alert and Oriented] : alert and oriented [Normal memory] : normal memory [de-identified] : Grade II/VI to III/VI harsh systolic murmur with preserved A2.  But slightly diminished; [de-identified] : Trace to 1+ pretibial/ankle edema with right greater than left

## 2024-02-22 NOTE — HISTORY OF PRESENT ILLNESS
[FreeTextEntry1] : Patient is also receiving pain management injections for bilateral knee degenerative arthritic changes;  He also has non-insulin-dependent diabetes however states his A1c has been well controlled;  Tolerating current cardiac medical regimen without difficulty;   Transthoracic Echocardiogram (8/29/2023):  There is now moderate aortic valve stenosis with AoV area (1.27 cm^2), with AoV peak and mean pressure gradients of (23.4 mmHg) and (11.0 mmHg) respectively and AoV max velocity (2.40 m/s).  There is normal LV size and wall thickness, with normal systolic and diastolic function; LVEF of 65 to 70%.  The left and right atria normal in size and structure.  Trace MR. Mildly elevated pulmonary artery systolic pressure.  There is trivial pericardial effusion;  Carotid Doppler (8/29/2023):  The left demonstrates moderate calcified and heterogenous plaquing. There is 50-69% stenosis of the left proximal ICA. The right demonstrates mild to moderate heterogenous plaquing.  There is 20-49% stenosis of right proximal ICA.  There is antegrade flow of the right and left vertebral arteries.  When compared to prior study dated 2020, there has been no significant interval change;   Pharmacologic Nuclear Stress test (11/14/2022):  Patient developed SOB, nausea and dizziness during stress exam which resolved with rest.  Patient developed occasional PVC, rare PAC during exercise.  The EKG was negative for ischemia.  Normal Sestamibi myocardial perfusion imaging with artifact. LV function was hyperdynamic with LVFEF of 71%.  There was no interval change when compared to prior study dated 2019;

## 2024-02-22 NOTE — ASSESSMENT
[FreeTextEntry1] : EKG shows sinus bradycardia at a rate of 61 bpm; . Otherwise within normal limits;   In summary, this 78-year-old gentleman has a history for mild hypertension which has been well controlled, hyperlipidemia, heart murmur of probably moderate aortic stenosis;  moderate stenosis of the left internal carotid artery (50 to 69%); He recently underwent lower spinal surgery for which she is still recuperating and progressing nicely.  Additionally he has had various arthritides of the knees for which he is receiving pain management injections and treatment;  Fortunately, he has had a fairly stable cardiac pattern and not complaining of any significant exertional dyspnea or discomfort at this time.  He denies palpitations dizziness or syncope;  Plan:  Discussed at length with patient the possible natural progression of aortic stenosis and the need to keep a close watch on the carotid stenosis as well in the future  Patient instructed to report any untoward chest symptoms or dyspnea, as well as any neurological symptoms or dizziness; etc  Recommend carotid duplex study and transthoracic echocardiogram prior to next visit within 4 to 5 months;  Continue current medical regimen and will renew;  Regular checkups and laboratory blood test with primary care encouraged; (including lipid panel with LDL goals well below 100).

## 2024-02-22 NOTE — REASON FOR VISIT
[FreeTextEntry1] : JESUS GRAY is being seen for arrhythmia/ECG abnormalities, structural heart and valve disease, hyperlipidemia, hypertension and carotid, aortic and peripheral vascular disease.   The patient is a 78-year-old white male who has a history for heart murmur of moderate aortic stenosis, mild hypertension and hyperlipidemia with moderate carotid plaquing stenosis--who presents back to the office today for general cardiac checkup;   Patient was cleared for lower spinal surgery last September which apparently went well and he is still slowly recuperating from this and doing some physical therapy and exercise (swimming and pool)  For the most part, he denies any significant chest pain, shortness of breath, palpitations, dizziness or syncope;

## 2024-02-23 ENCOUNTER — APPOINTMENT (OUTPATIENT)
Dept: ORTHOPEDIC SURGERY | Facility: CLINIC | Age: 79
End: 2024-02-23
Payer: MEDICARE

## 2024-02-23 VITALS — BODY MASS INDEX: 22.96 KG/M2 | WEIGHT: 164 LBS | HEIGHT: 71 IN

## 2024-02-23 PROCEDURE — 20610 DRAIN/INJ JOINT/BURSA W/O US: CPT | Mod: 50

## 2024-02-23 NOTE — HISTORY OF PRESENT ILLNESS
[de-identified] : The patient is here today for Genvisc injection for the bilateral knees.  Allergies: The patient denies allergies to medications and has no allergies to chicken,eggs, or feathers. Procedure: The patient has been identified by name and date of birth. Patient confirms that we are treating the bilateral knees today. The knees were prepped in the usual sterile fashion. The areas were cleansed with chlorhexadine, then sprayed with ethyl chloride. The patient was then injected with the Genvisc into the bilateral knees in the anterior position. The patient tolerated the procedure well. The medication was delivered aseptically and atraumatically. Diagnosis: Osteoarthritis of the bilateral knees Treatment: The patient was advised on the activities for today. I gave the patient instructions on postinjection ice and analgesia. Follow up recommended in 6 weeks

## 2024-02-23 NOTE — REASON FOR VISIT
[Follow-Up Visit] : a follow-up visit for [Other: ____] : [unfilled] [FreeTextEntry2] : Bilateral genvisc injection #5. Lot number: [id number]. Exp: 5/31/25.

## 2024-03-08 ENCOUNTER — APPOINTMENT (OUTPATIENT)
Dept: ORTHOPEDIC SURGERY | Facility: CLINIC | Age: 79
End: 2024-03-08

## 2024-03-08 ENCOUNTER — OUTPATIENT (OUTPATIENT)
Dept: OUTPATIENT SERVICES | Facility: HOSPITAL | Age: 79
LOS: 1 days | End: 2024-03-08

## 2024-03-08 DIAGNOSIS — Z98.1 ARTHRODESIS STATUS: ICD-10-CM

## 2024-03-08 DIAGNOSIS — Z98.890 OTHER SPECIFIED POSTPROCEDURAL STATES: Chronic | ICD-10-CM

## 2024-03-08 DIAGNOSIS — Z98.49 CATARACT EXTRACTION STATUS, UNSPECIFIED EYE: Chronic | ICD-10-CM

## 2024-03-12 ENCOUNTER — APPOINTMENT (OUTPATIENT)
Dept: NEUROSURGERY | Facility: CLINIC | Age: 79
End: 2024-03-12
Payer: COMMERCIAL

## 2024-03-12 VITALS
SYSTOLIC BLOOD PRESSURE: 116 MMHG | DIASTOLIC BLOOD PRESSURE: 68 MMHG | OXYGEN SATURATION: 100 % | TEMPERATURE: 98.8 F | WEIGHT: 164 LBS | HEART RATE: 64 BPM | HEIGHT: 71 IN | BODY MASS INDEX: 22.96 KG/M2

## 2024-03-12 DIAGNOSIS — M43.17 SPONDYLOLISTHESIS, LUMBOSACRAL REGION: ICD-10-CM

## 2024-03-12 DIAGNOSIS — Z98.890 OTHER SPECIFIED POSTPROCEDURAL STATES: ICD-10-CM

## 2024-03-12 PROCEDURE — 99214 OFFICE O/P EST MOD 30 MIN: CPT

## 2024-03-12 NOTE — HISTORY OF PRESENT ILLNESS
[de-identified] : Mr. Whitehead is a very pleasant 78-year-old gentleman who underwent an L5-S1 decompression and interbody fusion on 9/11/2023 who presents now for evaluation of some mild to moderately worsening lower back pain and occasional radiculopathy down the left leg in the setting of increasing his activity.  He reports that he was overall doing well until recently over the winter break started to have some lower back pain and occasional left lower extremity radiculopathy.  He denies any symptoms in the right leg.  He reports 50% improvement of his lower back and LLE pain.   He denies any valerio weakness or any numbness or tingling or any falls.  She also denies any bowel or bladder incontinence or retention or any saddle anesthesia.  He endorses he is participating in swimming 5 times a week at the gym.  He presents for review of xray of lumbar spine Status post posterior instrumented spinal fusion at L5-S1 with discectomy and interbody fusion. No definite compression fracture. Stable grade 1 anterolisthesis of L5 on S1. Mild multilevel degenerative disc disease at the nonfused levels.

## 2024-03-12 NOTE — PHYSICAL EXAM
[de-identified] : awake, alert interactive, appropriate RLE 5/5 HF/KE/KF/DF/PF/EHL LLE 5/5 HF/KE/KF/DF/PF/EHL SILT incision well healed narrow-based gait wnl reflexes 2+ [de-identified] : EXAM: [id number] - XR LS SPINE AP LAT 2-3 VIEWS  - ORDERED BY: KHADIJAH UNDERWOOD   PROCEDURE DATE:  03/08/2024    INTERPRETATION:  HISTORY: s/p L5/S1 fusion, assess hardware;  TECHNIQUE: 3 views of the lumbar spine.  COMPARISON: 1/8/2024  IMPRESSION:  Status post posterior instrumented spinal fusion at L5-S1 with discectomy and interbody fusion. No definite compression fracture. Stable grade 1 anterolisthesis of L5 on S1. Mild multilevel degenerative disc disease at the nonfused levels.  Atherosclerotic calcifications are noted.

## 2024-03-12 NOTE — ASSESSMENT
[FreeTextEntry1] : Mr. Whitehead is a very pleasant 78-year-old gentleman who is now approximately 4 months status post L5-S1 decompression and interbody fusion and was overall doing well at her 3-month visit but recently in the setting of liberalizing his activities has had some mildly worsening low back pain and occasional left lower extremity radiculopathy.  On x-ray there is no evidence of any hardware failure or screw pullout.  We discussed that during the recovery and rehabilitation, especially in the setting of increasing activity to rebuild and strengthen muscle, having intermittent back pain and intermittent radiculopathy is not unexpected but is expected to completely resolve over time.    Plan: -Xray of lumbar spine 3 months 6/2024 to assess fusion  - Continue with swim therapy - Follow-up in 3 months   I, Dr. Kenyatta Pollard, personally performed the evaluation and management (E/M) services for this established patient who presents today. That E/M includes conducting the clinically appropriate interval history &/or exam and establishing a continued plan of care. Today, my INGRIS, Andreia Chairez, was here to observe my evaluation and management service for this patient and follow the plan of care established by me going forward.

## 2024-04-03 ENCOUNTER — OUTPATIENT (OUTPATIENT)
Dept: OUTPATIENT SERVICES | Facility: HOSPITAL | Age: 79
LOS: 1 days | End: 2024-04-03
Payer: MEDICARE

## 2024-04-03 DIAGNOSIS — Z98.49 CATARACT EXTRACTION STATUS, UNSPECIFIED EYE: Chronic | ICD-10-CM

## 2024-04-03 DIAGNOSIS — Z98.890 OTHER SPECIFIED POSTPROCEDURAL STATES: Chronic | ICD-10-CM

## 2024-04-03 DIAGNOSIS — Z98.1 ARTHRODESIS STATUS: ICD-10-CM

## 2024-04-03 PROCEDURE — 72100 X-RAY EXAM L-S SPINE 2/3 VWS: CPT | Mod: 26

## 2024-04-05 ENCOUNTER — APPOINTMENT (OUTPATIENT)
Dept: ORTHOPEDIC SURGERY | Facility: CLINIC | Age: 79
End: 2024-04-05
Payer: MEDICARE

## 2024-04-05 VITALS
HEART RATE: 51 BPM | WEIGHT: 170 LBS | DIASTOLIC BLOOD PRESSURE: 76 MMHG | HEIGHT: 71 IN | SYSTOLIC BLOOD PRESSURE: 152 MMHG | BODY MASS INDEX: 23.8 KG/M2

## 2024-04-05 DIAGNOSIS — M17.0 BILATERAL PRIMARY OSTEOARTHRITIS OF KNEE: ICD-10-CM

## 2024-04-05 PROCEDURE — 20610 DRAIN/INJ JOINT/BURSA W/O US: CPT | Mod: 50

## 2024-04-05 PROCEDURE — 99214 OFFICE O/P EST MOD 30 MIN: CPT | Mod: 25

## 2024-04-05 NOTE — HISTORY OF PRESENT ILLNESS
[de-identified] : Oracio is a 78-year-old male does present today for follow-up evaluation of bilateral knee osteoarthritis.  He reports intermittent, moderate, right greater than left diffuse dull aching pain about both knees.  Associated with mild stiffness in both knees he does report intermittent swelling in the right knee.  He denies catching, locking or buckling. He recently completed gel injection on 2/23/24. These unfortunately did not work. He previously had steroid injections which worked better. He is hopeful to remain conservative.

## 2024-04-05 NOTE — PHYSICAL EXAM
[de-identified] : GENERAL APPEARANCE: Well nourished and hydrated, pleasant, alert, and oriented x 3. Appears their stated age.  HEENT: Normocephalic, extraocular eye motion intact. Nasal septum midline. Oral cavity clear. External auditory canal clear.  RESPIRATORY: Breath sounds clear and audible in all lobes. No wheezing, No accessory muscle use. CARDIOVASCULAR: No apparent abnormalities. No lower leg edema. No varicosities. Pedal pulses are palpable. NEUROLOGIC: Sensation is normal, no muscle weakness in the upper or lower extremities. DERMATOLOGIC: No apparent skin lesions, moist, warm, no rash. SPINE: Cervical spine appears normal and moves freely; thoracic spine appears normal and moves freely; lumbosacral spine appears normal and moves freely, normal, nontender. MUSCULOSKELETAL: Hands, wrists, and elbows are normal and move freely, shoulders are normal and move freely.  PSYCHIATRIC: Oriented to person, place, and time, insight and judgement were intact and the affect was normal.  Right knee exam shows mild effusion, ROM is 0-120 degrees, no instability, no pain with Phyllis, medial joint line tenderness.  Left knee exam shows no effusion, ROM is 0-120 degrees, no instability, no pain with Phyllis, medial joint line tenderness. 5/5 motor strength in bilateral lower extremities. Sensory: Intact in bilateral lower extremities. DTRs: Biceps, brachioradialis, triceps, patellar, ankle and plantar 2+ and symmetric bilaterally. Pulses: dorsalis pedis, posterior tibial, femoral, popliteal, and radial 2+ and symmetric bilaterally. [de-identified] : Prior xray 4 views of the bilateral knee demonstrate bone on bone medial compartment OA of the right knee and moderate OA of the left knee.

## 2024-04-05 NOTE — DISCUSSION/SUMMARY
[de-identified] : Oraico is a 78-year-old male with a past medical history significant for non-insulin-dependent diabetes who does present today for follow-up evaluation of bilateral knee medial compartment osteoarthritis, advanced in the right knee, moderate in the left knee. Patient was advised that he will need TKR but would like to hold off for now. Patient received a steroid injection and tolerated well. I recommended a course of Mobic. The patient was given a prescription for the Mobic with directions. The patient was instructed to stop the medicine and call the office if there are any adverse reaction to the medicine. The patient was also instructed to consult with their primary care doctor prior to starting the medication. Follow up in 6 weeks.

## 2024-04-05 NOTE — PROCEDURE
[de-identified] : Using sterile technique, 2cc of depomedrol 40mg/ml, 4cc of 1% plain lidocaine, and 2 cc 0.25% marcaine was drawn up into a sterile syringe. The left knee was then sterilely prepped with chlorhexidine. Ethyl chloride spray was used to anesthetize the skin and subQ tissue. The depomedrol/lidocaine/marcaine mixture was then injected into the knee joint in the anterolateral position. The patient tolerated the procedure well without difficulty. The patient was given instructions on the use of ice and anti-inflammatories post injection site soreness.   Using sterile technique, 2cc of depomedrol 40mg/ml, 4cc of 1% plain lidocaine, and 2 cc 0.25% marcaine was drawn up into a sterile syringe. The right knee was then sterilely prepped with chlorhexidine. Ethyl chloride spray was used to anesthetize the skin and subQ tissue. The depomedrol/lidocaine/marcaine mixture was then injected into the knee joint in the anterolateral position. The patient tolerated the procedure well without difficulty. The patient was given instructions on the use of ice and anti-inflammatories post injection site soreness.

## 2024-04-08 ENCOUNTER — NON-APPOINTMENT (OUTPATIENT)
Age: 79
End: 2024-04-08

## 2024-04-16 ENCOUNTER — RX ONLY (RX ONLY)
Age: 79
End: 2024-04-16

## 2024-04-16 ENCOUNTER — OFFICE (OUTPATIENT)
Dept: URBAN - METROPOLITAN AREA CLINIC 104 | Facility: CLINIC | Age: 79
Setting detail: OPHTHALMOLOGY
End: 2024-04-16
Payer: COMMERCIAL

## 2024-04-16 DIAGNOSIS — H01.001: ICD-10-CM

## 2024-04-16 DIAGNOSIS — H01.002: ICD-10-CM

## 2024-04-16 DIAGNOSIS — H16.223: ICD-10-CM

## 2024-04-16 DIAGNOSIS — H11.152: ICD-10-CM

## 2024-04-16 DIAGNOSIS — H01.005: ICD-10-CM

## 2024-04-16 DIAGNOSIS — H01.004: ICD-10-CM

## 2024-04-16 PROCEDURE — 99213 OFFICE O/P EST LOW 20 MIN: CPT | Performed by: OPTOMETRIST

## 2024-04-16 ASSESSMENT — LID POSITION - DERMATOCHALASIS
OD_DERMATOCHALASIS: RUL 1+
OS_DERMATOCHALASIS: LUL 1+

## 2024-04-16 ASSESSMENT — LID EXAM ASSESSMENTS
OD_COMMENTS: NO COLLARETTES
OS_BLEPHARITIS: LLL LUL 1+ 2+
OD_BLEPHARITIS: RLL RUL 1+ 2+
OS_COMMENTS: NO COLLARETTES

## 2024-05-13 ENCOUNTER — OUTPATIENT (OUTPATIENT)
Dept: OUTPATIENT SERVICES | Facility: HOSPITAL | Age: 79
LOS: 1 days | End: 2024-05-13
Payer: OTHER GOVERNMENT

## 2024-05-13 DIAGNOSIS — Z98.890 OTHER SPECIFIED POSTPROCEDURAL STATES: Chronic | ICD-10-CM

## 2024-05-13 DIAGNOSIS — Z98.49 CATARACT EXTRACTION STATUS, UNSPECIFIED EYE: Chronic | ICD-10-CM

## 2024-05-13 DIAGNOSIS — Z98.1 ARTHRODESIS STATUS: ICD-10-CM

## 2024-05-13 PROCEDURE — 72100 X-RAY EXAM L-S SPINE 2/3 VWS: CPT | Mod: 26

## 2024-05-14 ENCOUNTER — APPOINTMENT (OUTPATIENT)
Dept: NEUROSURGERY | Facility: CLINIC | Age: 79
End: 2024-05-14
Payer: COMMERCIAL

## 2024-05-14 VITALS
OXYGEN SATURATION: 98 % | DIASTOLIC BLOOD PRESSURE: 83 MMHG | HEIGHT: 71 IN | WEIGHT: 168 LBS | SYSTOLIC BLOOD PRESSURE: 160 MMHG | TEMPERATURE: 98.3 F | HEART RATE: 61 BPM | BODY MASS INDEX: 23.52 KG/M2

## 2024-05-14 PROCEDURE — 99214 OFFICE O/P EST MOD 30 MIN: CPT

## 2024-05-14 NOTE — ASSESSMENT
[FreeTextEntry1] : Mr. Whitehead is a very pleasant 78-year-old gentleman who is now approximately 8 months status post L5-S1 decompression and interbody fusion on 9/11/2023 who now presents with 3 to 4 days of left SI joint region pain as well as some numbness in the lateral left thigh and bilateral feet.  On exam he remains 5/5 in bilateral lower extremities without any long tract findings or hyperreflexia and XR L-spine AP and lateral demonstrates stable appearance of postoperative hardware.  We discussed that his symptoms may be multifactorial in origin and his diabetes could be potentially the cause of some of his foot numbness.  We discussed in terms of his pain in the SI joint area, this may indicate some level of SI joint inflammation and may benefit from some rest and potential evaluation by pain management or PM&R for potential injections or other nonsurgical modalities.  We discussed that he would likely benefit from a repeat XR L-spine AP and lateral and follow-up again in 3 months time for a 1 year follow-up visit or earlier should any new symptoms arise or current symptoms continue or worsen.  All questions were answered.  Plan: - Referral to PM&R/pain management for left SI joint pain - Follow-up in 3 months time with repeat XR L-spine AP and lateral

## 2024-05-14 NOTE — HISTORY OF PRESENT ILLNESS
[FreeTextEntry1] : Mr. Whitehead is a very pleasant 78-year-old gentleman who underwent an L5-S1 decompression and interbody fusion on 09/11/2023 who presents now for follow-up evaluation.  He reports that he was doing well until approximately 3 to 4 days ago when he started having pain in his left buttock region which seem to come on with walking or moving around with some associated numbness in his left lateral thigh and numbness in his dorsal and plantar surface of both feet.  He otherwise denies any issues with his incision or any other neurological symptoms at this time.

## 2024-05-14 NOTE — DATA REVIEWED
[de-identified] : X-ray lumbar spine AP and lateral obtained on 5/13/2024 demonstrates expected postoperative changes from prior L5-S1 decompression and interbody fusion without any evidence of hardware migration or screw pullout or hardware failure.

## 2024-05-14 NOTE — PHYSICAL EXAM
[FreeTextEntry1] : awake, alert interactive, appropriate RLE 5/5 HF/KE/KF/DF/PF/EHL LLE 5/5 HF/KE/KF/DF/PF/EHL SILT negative clonus bilaterally narrow-based gait wnl reflexes 2+ incision c/d/i

## 2024-05-17 ENCOUNTER — APPOINTMENT (OUTPATIENT)
Dept: PHYSICAL MEDICINE AND REHAB | Facility: CLINIC | Age: 79
End: 2024-05-17
Payer: COMMERCIAL

## 2024-05-17 VITALS
DIASTOLIC BLOOD PRESSURE: 59 MMHG | HEART RATE: 61 BPM | SYSTOLIC BLOOD PRESSURE: 136 MMHG | HEIGHT: 71 IN | WEIGHT: 168 LBS | BODY MASS INDEX: 23.52 KG/M2 | RESPIRATION RATE: 15 BRPM

## 2024-05-17 DIAGNOSIS — Z98.1 ARTHRODESIS STATUS: ICD-10-CM

## 2024-05-17 PROCEDURE — 99204 OFFICE O/P NEW MOD 45 MIN: CPT

## 2024-05-17 NOTE — ASSESSMENT
[FreeTextEntry1] : Mr. JESUS GRAY is a 78 year male who presents with left back/buttock pain, of note is s/p lumbar decompression and fusion 9/2023. He has TTP over left SIJ and multiple positive SIJ provocative maneuvers. Also complains of BL foot tingling, likely due to long hx of diabetes. L SIJ pain has persisted despite conservative management with home stretching, medications. Is preventing his mobility and ADLs.     Impression: low back pain left SIJ pain s/p lumbar fusion  peripheral neuropathy   Plan:  - NSGY and Ortho notes reviewed  - MR L spine and XR L spine reviewed  - continue tylenol 1000mg BID PRN  - Start gabapentin 300mg qhs, rx sent  - Will order L SIJ injection x1, R/B/A discussed.  - RTC after injection    The patient expressed verbal understanding and is in agreement with the plan of care. All of the patient's questions and concerns were addressed during today's visit.

## 2024-05-17 NOTE — REVIEW OF SYSTEMS
[Fever] : no fever [Chills] : no chills [Chest Pain] : no chest pain [Shortness Of Breath] : no shortness of breath [FreeTextEntry9] : +left low back/buttock pain

## 2024-05-17 NOTE — HISTORY OF PRESENT ILLNESS
[FreeTextEntry1] : Mr. JESUS GRAY is a 78 year male who presents with back pain  Follows with NSGY (Dr You) for LBP and Ortho (Dr. Ibanez) for knee pain  PMH hx of DVT, chronic lbp s/p L5-S1 decompression and interbody fusion on 9/11/23, shoulder pain, T2DM (HA1C 7% on 5/18/23)   Referred by Dr You for consideration of L SIJ injection    HPI  05/16/2024 Location: left low back/buttock  Onset: 2 weeks ago, denies inciting injury or trauma.  Provocation/Palliative: worse with standing in one position  Quality: sharp, shooting Radiation: LLE lateral thigh across shin and down to toes. has tingling in BL feet 2/2 diabetic peripheral neuropathy  Severity:  6-7/10   Denies any associated numbness. Denies any associated leg weakness. Denies any loss of bowel/bladder control or any groin numbness.   Current pain medications: tylenol 1000mg QD    Previous medications trialed: -    Previous procedures relevant to complaint: Injections:   4/5/24 - B knee CSI 40mg per side    2/23/24 - B knee genvisc    2/16/24 -  B knee genvisc    2/9/24 -  B knee genvisc    2/2/24 -  B knee genvisc    1/25/24 -  B knee genvisc    2023 - L SIJ injections and lumbar RFAs with Dr. Dejesus  Surgery:   9/11/23 -  L5-S1 decompression and interbody fusion      Conservative therapy tried: Physical Therapy: + not helpful  HEP: difficult due to pain      Diagnostic studies reviewed by me: XR L spine 4/3/24 - posterior hardware intact, L4-L5 disc degeneration  MRI 8/29/23 - multilevel degenerative changes, grade 1 anterolisthesis of L5 on S1,

## 2024-05-17 NOTE — DATA REVIEWED
[MRI] : MRI [FreeTextEntry1] : 	 EXAM: [id number] - XR LS SPINE AP LAT 2-3 VIEWS  - ORDERED BY: SEYMOUR KWAN   PROCEDURE DATE:  04/03/2024    INTERPRETATION:  Clinical indication: Back pain  Technique: 2 views of the lumbar spine  Comparison: 3/8/2024  Findings: Patient is status post posterior spinal fusion from L5-S1 using paired bilateral vertical rods secured by bilateral transpedicular screws. There is an interbody fusion device at L5-S1 as well. No evidence for hardware complication.  There is no significant loss of vertebral body height. There is no significant listhesis or scoliosis. There is multilevel discogenic degenerative disease of the lumbar spine as manifested by disc space narrowing and endplate osteophytosis, most pronounced at L4-L5.  Impression:  Posterior spinal fusion from L5 to S1 without hardware complication.  Discogenic degenerative disease, most pronounced at L4-L5.  --- End of Report ---       SHARITA MYERS MD; Attending Radiologist This document has been electronically signed. Apr 5 2024  3:26PM  EXAM: [id number] - MR SPINE LUMBAR  - ORDERED BY: ANNE MARIE WILSON   PROCEDURE DATE:  08/29/2023    INTERPRETATION:  CLINICAL INFORMATION: Lower back pain  ADDITIONAL CLINICAL INFORMATION: Other Condition see Clinical Info  TECHNIQUE: Multiplanar, multisequence MRI was performed of the lumbar spine. IV Contrast: NONE  PRIOR STUDIES: Radiographs dated 8/21/2023  FINDINGS:  LOCALIZER: No additional findings. BONES: 5 lumbar vertebral bodies are assumed. Chronic mild compression fracture of L5. No acute fracture. Diminutive appearance of the left pars interarticularis of L5. Posterior decompression at L5-S1. ALIGNMENT: Grade 1 anterolisthesis of L5 on S1. SACROILIAC JOINTS/SACRUM: Mild degenerative changes of the visualized superior sacroiliac joints. CONUS AND CAUDA EQUINA: The distal cord and conus are normal in signal. Conus terminates at L2. VISUALIZED INTRAPELVIC/INTRA-ABDOMINAL SOFT TISSUES: Tiny right-sided renal cysts. PARASPINAL SOFT TISSUES: Mild fatty atrophy of the posterior paraspinal musculature.  INDIVIDUAL LEVELS:  T12-L1: No canal or neuroforaminal stenosis. L1-L2: Minimal disc bulge. No canal or neuroforaminal stenosis. L2-L3: Minimal disc bulge. Mild right facet arthrosis. No canal or neuroforaminal stenosis. L3-L4: Minimal disc bulge. Mild left facet arthrosis. Mild bilateral neural foraminal narrowing. No central canal stenosis. L4-L5: Disc bulge. Mild bilateral facet arthrosis. Mild/moderate bilateral neural foraminal narrowing. No central canal stenosis. L5-S1: Posterior decompression. Uncovering of the posterior disc space with disc bulge. Severe bilateral facet arthrosis.  Severe bilateral neural foraminal narrowing. No central canal stenosis.  IMPRESSION:  Status post posterior decompression at L5-S1.  Multilevel degenerative changes throughout the lumbar spine, as detailed above, worst at L5-S1.  Grade 1 anterolisthesis of L5 on S1. Diminutive appearance of the left pars interarticularis of L5, which may represent a pars defect. Consider further evaluation with lumbar spine CT.  Chronic mild compression fracture of L5.  --- End of Report ---       MARKEL BEDOYA M.D., ATTENDING RADIOLOGIST This document has been electronically signed. Aug 31 2023 12:29PM

## 2024-05-17 NOTE — PHYSICAL EXAM
[FreeTextEntry1] : Constitutional: In NAD, calm and cooperative Heart: well perfused Lungs: nonlabored breathing MSK (Back) Inspection: +midline lumbar incision, skin approximated, no erythema, no swelling, no discharge.  Palpation: Tenderness of the left lower lumbar paraspinals, ++TTP over left SIJ  ROM: Pain at end lumbar extension/flexion Strength: 5/5 strength in bilateral lower extremities Reflexes: 2+ Patella reflex bilaterally, 2+ Achilles reflex bilaterally, negative clonus bilaterally Sensation: Intact to light touch in bilateral lower extremities Special tests: SLR: negative BL CK: positive L FADIR: negative BL pelvic compression:  positive L Thigh Thrust:  positive L Stacy's Finger:   positive L Facet loading: negative BL

## 2024-05-29 ENCOUNTER — TRANSCRIPTION ENCOUNTER (OUTPATIENT)
Age: 79
End: 2024-05-29

## 2024-05-30 ENCOUNTER — OUTPATIENT (OUTPATIENT)
Dept: OUTPATIENT SERVICES | Facility: HOSPITAL | Age: 79
LOS: 1 days | Discharge: HOME | End: 2024-05-30
Payer: COMMERCIAL

## 2024-05-30 ENCOUNTER — APPOINTMENT (OUTPATIENT)
Dept: PHYSICAL MEDICINE AND REHAB | Facility: GI CENTER | Age: 79
End: 2024-05-30
Payer: COMMERCIAL

## 2024-05-30 DIAGNOSIS — Z98.890 OTHER SPECIFIED POSTPROCEDURAL STATES: Chronic | ICD-10-CM

## 2024-05-30 DIAGNOSIS — M53.3 SACROCOCCYGEAL DISORDERS, NOT ELSEWHERE CLASSIFIED: ICD-10-CM

## 2024-05-30 DIAGNOSIS — Z98.49 CATARACT EXTRACTION STATUS, UNSPECIFIED EYE: Chronic | ICD-10-CM

## 2024-05-30 LAB — GLUCOSE BLDC GLUCOMTR-MCNC: 151 MG/DL — HIGH (ref 70–99)

## 2024-05-30 PROCEDURE — 82962 GLUCOSE BLOOD TEST: CPT

## 2024-05-30 PROCEDURE — 76000 FLUOROSCOPY <1 HR PHYS/QHP: CPT

## 2024-05-30 PROCEDURE — 27096 INJECT SACROILIAC JOINT: CPT | Mod: LT

## 2024-05-30 PROCEDURE — G0260: CPT | Mod: LT

## 2024-05-30 NOTE — PROCEDURE
[de-identified] : Sacroiliac Joint Injection   Attending: Shanda Bates DO   Pre-operative diagnosis: Sacroiliac Joint dysfunction   Post-operative diagnosis: Sacroiliac Joint dysfunction   Procedure: LEFT  sacroiliac joint injection under fluoroscopic guidance   SEDATION: local only   ESTIMATED BLOOD LOSS: None   SIDE: LEFT   DESCRIPTION: Patient was greeted in the pre-procedure holding area. The risks, benefits and alternatives to the procedure were again reviewed with the patient and written informed consent was placed in the chart. Patient was taken to the procedure room and positioned prone on the fluoroscopic table. Prior to the procedure, a time out was completed, verifying correct patient, procedure and site.   The skin was prepped with chlorhexidine and draped in usual sterile fashion. A  film was taken to identify the left sacroiliac joint, and the inferior most intersection of the anterior and posterior sacroiliac joint lines. The overlying skin and subcutaneous tissue were anesthetized using a 27-gauge and 1-1/2 inch needle with 1% preservative-free lidocaine for a total volume of 3ml. A 22-gauge 3.5" Quincke spinal needle was advanced into the sacroiliac joint space under intermittent fluoroscopic guidance. Needle position was confirmed on both AP view.    Then, after negative aspiration, 1mL of contrast was injected, which confirmed adequate intra-articular spread. There was no evidence of intravascular uptake. At this point, after negative aspiration, a total of 40mg depomedrol and 1mL of lidocaine 1% was injected easily into the joint. The needle was then flushed with 1% lidocaine and removed. The needle insertion site was dressed appropriately.   Disposition: Patient was taken to the recovery room where they were monitored for a brief period of time. The patient tolerated the procedure well and was discharged home in stable condition with post procedural instructions. Patient was instructed to follow up in 2 weeks.

## 2024-06-11 ENCOUNTER — APPOINTMENT (OUTPATIENT)
Dept: PHYSICAL MEDICINE AND REHAB | Facility: CLINIC | Age: 79
End: 2024-06-11
Payer: COMMERCIAL

## 2024-06-11 VITALS
BODY MASS INDEX: 23.52 KG/M2 | WEIGHT: 168 LBS | SYSTOLIC BLOOD PRESSURE: 175 MMHG | DIASTOLIC BLOOD PRESSURE: 78 MMHG | RESPIRATION RATE: 16 BRPM | HEIGHT: 71 IN | HEART RATE: 68 BPM

## 2024-06-11 DIAGNOSIS — M53.3 SACROCOCCYGEAL DISORDERS, NOT ELSEWHERE CLASSIFIED: ICD-10-CM

## 2024-06-11 DIAGNOSIS — Z98.890 OTHER SPECIFIED POSTPROCEDURAL STATES: ICD-10-CM

## 2024-06-11 PROCEDURE — 99214 OFFICE O/P EST MOD 30 MIN: CPT

## 2024-06-11 RX ORDER — GABAPENTIN 300 MG/1
300 CAPSULE ORAL
Qty: 30 | Refills: 0 | Status: DISCONTINUED | COMMUNITY
Start: 2024-05-17 | End: 2024-06-11

## 2024-06-11 RX ORDER — GABAPENTIN 100 MG/1
100 CAPSULE ORAL AT BEDTIME
Qty: 30 | Refills: 2 | Status: ACTIVE | COMMUNITY
Start: 2024-06-11 | End: 1900-01-01

## 2024-06-11 RX ORDER — LIDOCAINE 5% 700 MG/1
5 PATCH TOPICAL
Qty: 30 | Refills: 2 | Status: ACTIVE | COMMUNITY
Start: 2024-06-11 | End: 1900-01-01

## 2024-06-11 NOTE — REVIEW OF SYSTEMS
[Fever] : no fever [Chills] : no chills [Chest Pain] : no chest pain [Shortness Of Breath] : no shortness of breath [Incontinence] : no incontinence [Headache] : no headaches [Dizziness] : no dizziness [FreeTextEntry9] : +left low back/buttock pain

## 2024-06-11 NOTE — HISTORY OF PRESENT ILLNESS
[FreeTextEntry1] : Mr. JESUS GRAY is a 78 year male who presents with back pain  Follows with NSGY (Dr You) for LBP and Ortho (Dr. Ibanez) for knee pain  PMH hx of DVT, chronic lbp s/p L5-S1 decompression and interbody fusion on 9/11/23, shoulder pain, T2DM (HA1C 7% on 5/18/23)   Referred by Dr You for consideration of L SIJ injection    Interval 06/11/2024  Mr. JESUS GRAY is a 78 year male presents for follow up for L low back pain 2/2 SIJ pain. He is here for post-injection eval after left SIJ injection on 5/30/24. He states that the sharp pain in his left low back is mostly gone, but he continues to have pain in the middle of his buttock, rated 5-6/10. Pain radiates down his posterior/lateral left thigh up to the knee. Has chronic numbness in his left foot, which preceded his fusion. He notes that the gabapentin was helpful for his pain, but 300mg dose was causing dizziness and drowsiness. Pain is worse with sitting, leaning and putting pressure on his left buttock, with prolonged standing. Denies any new numbness. Denies any associated leg weakness. Denies any loss of bowel/bladder control or any groin numbness.   HPI  05/16/2024 Location: left low back/buttock  Onset: 2 weeks ago, denies inciting injury or trauma.  Provocation/Palliative: worse with standing in one position  Quality: sharp, shooting Radiation: LLE lateral thigh across shin and down to toes. has tingling in BL feet 2/2 diabetic peripheral neuropathy  Severity:  6-7/10   Denies any associated numbness. Denies any associated leg weakness. Denies any loss of bowel/bladder control or any groin numbness.   Current pain medications: tylenol 1000mg QD  gabapentin 300mg qhs - helpful but causes dizziness    Previous medications trialed: -    Previous procedures relevant to complaint: Injections:   5/30/24 - L SIJ injection 20% improvement.    4/5/24 - B knee CSI 40mg per side    2/23/24 - B knee genvisc    2/16/24 -  B knee genvisc    2/9/24 -  B knee genvisc    2/2/24 -  B knee genvisc    1/25/24 -  B knee genvisc    2023 - L SIJ injections and lumbar RFAs with Dr. Dejesus  Surgery:   9/11/23 -  L5-S1 decompression and interbody fusion      Conservative therapy tried: Physical Therapy: + not helpful  HEP: difficult due to pain      Diagnostic studies reviewed by me: XR L spine 4/3/24 - posterior hardware intact, L4-L5 disc degeneration  MRI 8/29/23 - multilevel degenerative changes, grade 1 anterolisthesis of L5 on S1,

## 2024-06-11 NOTE — PHYSICAL EXAM
[FreeTextEntry1] : Constitutional: In NAD, calm and cooperative Heart: well perfused Lungs: nonlabored breathing MSK (Back) Inspection: +midline lumbar incision, skin approximated, no erythema, no swelling, no discharge.  Palpation: Tenderness of the left lower lumbar paraspinals, mild TTP over left SIJ, +++TTP over left piriformis  ROM: Pain at end lumbar extension and rotation  Strength: 5/5 strength in bilateral lower extremities Reflexes: 2+ Patella reflex bilaterally, 2+ Achilles reflex bilaterally, negative clonus bilaterally Sensation: Intact to light touch in bilateral lower extremities Special tests: SLR: negative BL CK: negative BL FADIR: +caused pain across left buttock  pelvic compression:  negative BL Thigh Thrust:  mild positive L Stacy's Finger:   mild positive L Facet loading: negative BL

## 2024-06-11 NOTE — ASSESSMENT
[FreeTextEntry1] : Mr. JESUS GRAY is a 78 year male who presents with left back/buttock pain, of note is s/p lumbar decompression and fusion 9/2023. Etiology of his pain is likely multifactorial due to left SIJ pain and left piriformis pain. Sharp pain directly over L SIJ has improved after L SIJ injection. On examination, left SIJ is less tender to palpation and SIJ provocative maneuvers cause mild discomfort.  Left buttock pain is elicited with direct palpation over the belly of the L piriformis muscle, and is reproduced with FAIR movement. This pain has persisted despite conservative management with home stretching, medications. Is preventing his mobility and ADLs.     Impression: left piriformis syndrome  low back pain left SIJ pain s/p lumbar fusion  peripheral neuropathy   Plan:  - XR B hips reviewed  - continue tylenol 1000mg BID PRN  - decrease gabapentin dose from 300mg qhs to 100mg qhs, new rx sent.  - Apply lidocaine patch to affected area once daily. Leave on for 12  hours, then remove for 12 hours. Rx sent. - Will order L piriformis injection x1, R/B/A discussed.  - RTC after injection    The patient expressed verbal understanding and is in agreement with the plan of care. All of the patient's questions and concerns were addressed during today's visit.

## 2024-06-11 NOTE — DATA REVIEWED
[MRI] : MRI [FreeTextEntry1] : 	 EXAM: [id number] - XR LS SPINE AP LAT 2-3 VIEWS  - ORDERED BY: SEYMOUR KWAN   PROCEDURE DATE:  04/03/2024    INTERPRETATION:  Clinical indication: Back pain  Technique: 2 views of the lumbar spine  Comparison: 3/8/2024  Findings: Patient is status post posterior spinal fusion from L5-S1 using paired bilateral vertical rods secured by bilateral transpedicular screws. There is an interbody fusion device at L5-S1 as well. No evidence for hardware complication.  There is no significant loss of vertebral body height. There is no significant listhesis or scoliosis. There is multilevel discogenic degenerative disease of the lumbar spine as manifested by disc space narrowing and endplate osteophytosis, most pronounced at L4-L5.  Impression:  Posterior spinal fusion from L5 to S1 without hardware complication.  Discogenic degenerative disease, most pronounced at L4-L5.  --- End of Report ---       SHARITA MYERS MD; Attending Radiologist This document has been electronically signed. Apr 5 2024  3:26PM  EXAM: [id number] - MR SPINE LUMBAR  - ORDERED BY: ANNE MARIE WILSON   PROCEDURE DATE:  08/29/2023    INTERPRETATION:  CLINICAL INFORMATION: Lower back pain  ADDITIONAL CLINICAL INFORMATION: Other Condition see Clinical Info  TECHNIQUE: Multiplanar, multisequence MRI was performed of the lumbar spine. IV Contrast: NONE  PRIOR STUDIES: Radiographs dated 8/21/2023  FINDINGS:  LOCALIZER: No additional findings. BONES: 5 lumbar vertebral bodies are assumed. Chronic mild compression fracture of L5. No acute fracture. Diminutive appearance of the left pars interarticularis of L5. Posterior decompression at L5-S1. ALIGNMENT: Grade 1 anterolisthesis of L5 on S1. SACROILIAC JOINTS/SACRUM: Mild degenerative changes of the visualized superior sacroiliac joints. CONUS AND CAUDA EQUINA: The distal cord and conus are normal in signal. Conus terminates at L2. VISUALIZED INTRAPELVIC/INTRA-ABDOMINAL SOFT TISSUES: Tiny right-sided renal cysts. PARASPINAL SOFT TISSUES: Mild fatty atrophy of the posterior paraspinal musculature.  INDIVIDUAL LEVELS:  T12-L1: No canal or neuroforaminal stenosis. L1-L2: Minimal disc bulge. No canal or neuroforaminal stenosis. L2-L3: Minimal disc bulge. Mild right facet arthrosis. No canal or neuroforaminal stenosis. L3-L4: Minimal disc bulge. Mild left facet arthrosis. Mild bilateral neural foraminal narrowing. No central canal stenosis. L4-L5: Disc bulge. Mild bilateral facet arthrosis. Mild/moderate bilateral neural foraminal narrowing. No central canal stenosis. L5-S1: Posterior decompression. Uncovering of the posterior disc space with disc bulge. Severe bilateral facet arthrosis.  Severe bilateral neural foraminal narrowing. No central canal stenosis.  IMPRESSION:  Status post posterior decompression at L5-S1.  Multilevel degenerative changes throughout the lumbar spine, as detailed above, worst at L5-S1.  Grade 1 anterolisthesis of L5 on S1. Diminutive appearance of the left pars interarticularis of L5, which may represent a pars defect. Consider further evaluation with lumbar spine CT.  Chronic mild compression fracture of L5.  --- End of Report ---       MARKEL BEDOYA M.D., ATTENDING RADIOLOGIST This document has been electronically signed. Aug 31 2023 12:29PM

## 2024-06-20 ENCOUNTER — APPOINTMENT (OUTPATIENT)
Dept: PAIN MANAGEMENT | Facility: CLINIC | Age: 79
End: 2024-06-20

## 2024-06-20 NOTE — HISTORY OF PRESENT ILLNESS
[Lower back] : lower back [8] : 8 [6] : 6 [Dull/Aching] : dull/aching [Sharp] : sharp [] : yes [Constant] : constant [Household chores] : household chores [Leisure] : leisure [Social interactions] : social interactions [Rest] : rest [Sitting] : sitting [Standing] : standing [Walking] : walking [Bending forward] : bending forward [Stairs] : stairs [de-identified] : 6/20/24 06/05/2023 : Patient presents for initial evaluation. 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 Has been seeing Dr. Dejesus and had numerous injections both into the back  and the SIJ. He did get good relief with the SIJ injections however the cortisone would make his sugars go up. (His insurance has changed) PT has not helped. acupuncture did not help.   Subjective Weakness: Yes Numbness/Tingling: Yes- hip and back Bladder/Bowel dysfunction: No Treatments Tried:  PT, injections  Attempted modalities for current pain complaint: See above: Medications: No  Injections: Yes SIJ injections and lumbar RFA's.   Previous Spine Surgery: Laminectomy and Discectomy L5/S1 - 3 years ago  Imaging: MRI Lumbar Spine (7/13/22) stand up: l3/4 bulge and FA, L4/5 broad bulge with FA, L5/s1 grade 1 lithesis with FA and NF stenosis.  Left hip 2021: medial hip arthrosis, fraying of the labrum with joint effusion   [FreeTextEntry1] : hips

## 2024-06-20 NOTE — PHYSICAL EXAM
[NL (90)] : forward flexion 90 degrees [Extension] : extension [Left] : left hip [5___] : adduction 5[unfilled]/5 [FreeTextEntry8] : left worse than right  [de-identified] : extension 20 degrees [] : no groin tenderness [de-identified] : external rotation 10 degrees [TWNoteComboBox6] : internal rotation 0 degrees

## 2024-06-27 ENCOUNTER — APPOINTMENT (OUTPATIENT)
Dept: PHYSICAL MEDICINE AND REHAB | Facility: GI CENTER | Age: 79
End: 2024-06-27

## 2024-06-27 ENCOUNTER — OUTPATIENT (OUTPATIENT)
Dept: OUTPATIENT SERVICES | Facility: HOSPITAL | Age: 79
LOS: 1 days | End: 2024-06-27
Payer: COMMERCIAL

## 2024-06-27 DIAGNOSIS — G57.02 LESION OF SCIATIC NERVE, LEFT LOWER LIMB: ICD-10-CM

## 2024-06-27 DIAGNOSIS — Z98.49 CATARACT EXTRACTION STATUS, UNSPECIFIED EYE: Chronic | ICD-10-CM

## 2024-06-27 DIAGNOSIS — Z98.890 OTHER SPECIFIED POSTPROCEDURAL STATES: Chronic | ICD-10-CM

## 2024-06-27 LAB — GLUCOSE BLDC GLUCOMTR-MCNC: 105 MG/DL — HIGH (ref 70–99)

## 2024-06-27 PROCEDURE — 77002 NEEDLE LOCALIZATION BY XRAY: CPT | Mod: 26,LT

## 2024-06-27 PROCEDURE — 82962 GLUCOSE BLOOD TEST: CPT

## 2024-06-27 PROCEDURE — 20552 NJX 1/MLT TRIGGER POINT 1/2: CPT

## 2024-06-27 PROCEDURE — 76000 FLUOROSCOPY <1 HR PHYS/QHP: CPT

## 2024-06-28 ENCOUNTER — APPOINTMENT (OUTPATIENT)
Dept: CARDIOLOGY | Facility: CLINIC | Age: 79
End: 2024-06-28
Payer: COMMERCIAL

## 2024-06-28 PROCEDURE — 93306 TTE W/DOPPLER COMPLETE: CPT

## 2024-06-28 PROCEDURE — 93880 EXTRACRANIAL BILAT STUDY: CPT

## 2024-07-12 ENCOUNTER — APPOINTMENT (OUTPATIENT)
Dept: ORTHOPEDIC SURGERY | Facility: CLINIC | Age: 79
End: 2024-07-12
Payer: MEDICARE

## 2024-07-12 VITALS
BODY MASS INDEX: 23.52 KG/M2 | WEIGHT: 168 LBS | HEART RATE: 51 BPM | SYSTOLIC BLOOD PRESSURE: 169 MMHG | DIASTOLIC BLOOD PRESSURE: 80 MMHG | HEIGHT: 71 IN

## 2024-07-12 PROCEDURE — G2211 COMPLEX E/M VISIT ADD ON: CPT

## 2024-07-12 PROCEDURE — 99213 OFFICE O/P EST LOW 20 MIN: CPT

## 2024-07-12 PROCEDURE — 73564 X-RAY EXAM KNEE 4 OR MORE: CPT | Mod: 50

## 2024-07-16 ENCOUNTER — NON-APPOINTMENT (OUTPATIENT)
Age: 79
End: 2024-07-16

## 2024-07-16 ENCOUNTER — APPOINTMENT (OUTPATIENT)
Dept: CARDIOLOGY | Facility: CLINIC | Age: 79
End: 2024-07-16
Payer: MEDICARE

## 2024-07-16 VITALS
WEIGHT: 162 LBS | DIASTOLIC BLOOD PRESSURE: 62 MMHG | HEIGHT: 71 IN | SYSTOLIC BLOOD PRESSURE: 138 MMHG | BODY MASS INDEX: 22.68 KG/M2 | HEART RATE: 63 BPM | RESPIRATION RATE: 16 BRPM

## 2024-07-16 DIAGNOSIS — I65.29 OCCLUSION AND STENOSIS OF UNSPECIFIED CAROTID ARTERY: ICD-10-CM

## 2024-07-16 DIAGNOSIS — I10 ESSENTIAL (PRIMARY) HYPERTENSION: ICD-10-CM

## 2024-07-16 DIAGNOSIS — R01.1 CARDIAC MURMUR, UNSPECIFIED: ICD-10-CM

## 2024-07-16 DIAGNOSIS — I35.0 NONRHEUMATIC AORTIC (VALVE) STENOSIS: ICD-10-CM

## 2024-07-16 PROCEDURE — G2211 COMPLEX E/M VISIT ADD ON: CPT

## 2024-07-16 PROCEDURE — 99214 OFFICE O/P EST MOD 30 MIN: CPT

## 2024-07-16 PROCEDURE — 93000 ELECTROCARDIOGRAM COMPLETE: CPT

## 2024-07-18 ENCOUNTER — APPOINTMENT (OUTPATIENT)
Dept: ORTHOPEDIC SURGERY | Facility: CLINIC | Age: 79
End: 2024-07-18
Payer: MEDICARE

## 2024-07-18 VITALS
SYSTOLIC BLOOD PRESSURE: 161 MMHG | DIASTOLIC BLOOD PRESSURE: 70 MMHG | BODY MASS INDEX: 22.68 KG/M2 | HEIGHT: 71 IN | WEIGHT: 162 LBS

## 2024-07-18 DIAGNOSIS — M17.0 BILATERAL PRIMARY OSTEOARTHRITIS OF KNEE: ICD-10-CM

## 2024-07-18 PROCEDURE — 99213 OFFICE O/P EST LOW 20 MIN: CPT | Mod: 25

## 2024-07-18 PROCEDURE — 20610 DRAIN/INJ JOINT/BURSA W/O US: CPT | Mod: 50

## 2024-07-19 ENCOUNTER — OFFICE (OUTPATIENT)
Dept: URBAN - METROPOLITAN AREA CLINIC 104 | Facility: CLINIC | Age: 79
Setting detail: OPHTHALMOLOGY
End: 2024-07-19
Payer: COMMERCIAL

## 2024-07-19 DIAGNOSIS — H01.002: ICD-10-CM

## 2024-07-19 DIAGNOSIS — H01.001: ICD-10-CM

## 2024-07-19 DIAGNOSIS — H01.004: ICD-10-CM

## 2024-07-19 DIAGNOSIS — H16.223: ICD-10-CM

## 2024-07-19 PROCEDURE — 92250 FUNDUS PHOTOGRAPHY W/I&R: CPT | Performed by: OPTOMETRIST

## 2024-07-19 PROCEDURE — 92014 COMPRE OPH EXAM EST PT 1/>: CPT | Performed by: OPTOMETRIST

## 2024-07-19 ASSESSMENT — CONFRONTATIONAL VISUAL FIELD TEST (CVF)
OS_FINDINGS: FULL
OD_FINDINGS: FULL

## 2024-07-19 ASSESSMENT — LID POSITION - DERMATOCHALASIS
OS_DERMATOCHALASIS: LUL 1+
OD_DERMATOCHALASIS: RUL 1+

## 2024-07-19 ASSESSMENT — LID EXAM ASSESSMENTS
OS_COMMENTS: NO COLLARETTES
OD_COMMENTS: NO COLLARETTES
OS_BLEPHARITIS: LLL LUL T 1+
OD_BLEPHARITIS: RLL RUL T 1+

## 2024-07-24 ENCOUNTER — APPOINTMENT (OUTPATIENT)
Dept: PHYSICAL MEDICINE AND REHAB | Facility: CLINIC | Age: 79
End: 2024-07-24

## 2024-07-24 NOTE — DATA REVIEWED
[FreeTextEntry1] : 	 EXAM: [id number] - XR LS SPINE AP LAT 2-3 VIEWS  - ORDERED BY: SEYMOUR KWAN   PROCEDURE DATE:  04/03/2024    INTERPRETATION:  Clinical indication: Back pain  Technique: 2 views of the lumbar spine  Comparison: 3/8/2024  Findings: Patient is status post posterior spinal fusion from L5-S1 using paired bilateral vertical rods secured by bilateral transpedicular screws. There is an interbody fusion device at L5-S1 as well. No evidence for hardware complication.  There is no significant loss of vertebral body height. There is no significant listhesis or scoliosis. There is multilevel discogenic degenerative disease of the lumbar spine as manifested by disc space narrowing and endplate osteophytosis, most pronounced at L4-L5.  Impression:  Posterior spinal fusion from L5 to S1 without hardware complication.  Discogenic degenerative disease, most pronounced at L4-L5.  --- End of Report ---       SHARITA MYERS MD; Attending Radiologist This document has been electronically signed. Apr 5 2024  3:26PM  EXAM: [id number] - MR SPINE LUMBAR  - ORDERED BY: ANNE MARIE WILSON   PROCEDURE DATE:  08/29/2023    INTERPRETATION:  CLINICAL INFORMATION: Lower back pain  ADDITIONAL CLINICAL INFORMATION: Other Condition see Clinical Info  TECHNIQUE: Multiplanar, multisequence MRI was performed of the lumbar spine. IV Contrast: NONE  PRIOR STUDIES: Radiographs dated 8/21/2023  FINDINGS:  LOCALIZER: No additional findings. BONES: 5 lumbar vertebral bodies are assumed. Chronic mild compression fracture of L5. No acute fracture. Diminutive appearance of the left pars interarticularis of L5. Posterior decompression at L5-S1. ALIGNMENT: Grade 1 anterolisthesis of L5 on S1. SACROILIAC JOINTS/SACRUM: Mild degenerative changes of the visualized superior sacroiliac joints. CONUS AND CAUDA EQUINA: The distal cord and conus are normal in signal. Conus terminates at L2. VISUALIZED INTRAPELVIC/INTRA-ABDOMINAL SOFT TISSUES: Tiny right-sided renal cysts. PARASPINAL SOFT TISSUES: Mild fatty atrophy of the posterior paraspinal musculature.  INDIVIDUAL LEVELS:  T12-L1: No canal or neuroforaminal stenosis. L1-L2: Minimal disc bulge. No canal or neuroforaminal stenosis. L2-L3: Minimal disc bulge. Mild right facet arthrosis. No canal or neuroforaminal stenosis. L3-L4: Minimal disc bulge. Mild left facet arthrosis. Mild bilateral neural foraminal narrowing. No central canal stenosis. L4-L5: Disc bulge. Mild bilateral facet arthrosis. Mild/moderate bilateral neural foraminal narrowing. No central canal stenosis. L5-S1: Posterior decompression. Uncovering of the posterior disc space with disc bulge. Severe bilateral facet arthrosis.  Severe bilateral neural foraminal narrowing. No central canal stenosis.  IMPRESSION:  Status post posterior decompression at L5-S1.  Multilevel degenerative changes throughout the lumbar spine, as detailed above, worst at L5-S1.  Grade 1 anterolisthesis of L5 on S1. Diminutive appearance of the left pars interarticularis of L5, which may represent a pars defect. Consider further evaluation with lumbar spine CT.  Chronic mild compression fracture of L5.  --- End of Report ---       MARKEL BEDOYA M.D., ATTENDING RADIOLOGIST This document has been electronically signed. Aug 31 2023 12:29PM

## 2024-07-24 NOTE — HISTORY OF PRESENT ILLNESS
[FreeTextEntry1] : Mr. JESUS GRAY is a 78 year male who presents with back pain  Follows with NSGY (Dr You) for LBP and Ortho (Dr. Ibanez) for knee pain  PMH hx of DVT, chronic lbp s/p L5-S1 decompression and interbody fusion on 9/11/23, shoulder pain, T2DM (HA1C 7% on 5/18/23)   Referred by Dr You for consideration of L SIJ injection   interval 07/24/2024  Mr. JESUS GRAY is a 78 year male presents for follow up for L low back/buttock pain 2/2 SIJ pain and piriformis pain. He is here for post injection eval after left piriformis injection on 6/27/24.      Interval 06/11/2024  Mr. JESUS GRAY is a 78 year male presents for follow up for L low back pain 2/2 SIJ pain. He is here for post-injection eval after left SIJ injection on 5/30/24. He states that the sharp pain in his left low back is mostly gone, but he continues to have pain in the middle of his buttock, rated 5-6/10. Pain radiates down his posterior/lateral left thigh up to the knee. Has chronic numbness in his left foot, which preceded his fusion. He notes that the gabapentin was helpful for his pain, but 300mg dose was causing dizziness and drowsiness. Pain is worse with sitting, leaning and putting pressure on his left buttock, with prolonged standing. Denies any new numbness. Denies any associated leg weakness. Denies any loss of bowel/bladder control or any groin numbness.   HPI  05/16/2024 Location: left low back/buttock  Onset: 2 weeks ago, denies inciting injury or trauma.  Provocation/Palliative: worse with standing in one position  Quality: sharp, shooting Radiation: LLE lateral thigh across shin and down to toes. has tingling in BL feet 2/2 diabetic peripheral neuropathy  Severity:  6-7/10   Denies any associated numbness. Denies any associated leg weakness. Denies any loss of bowel/bladder control or any groin numbness.   Current pain medications: tylenol 1000mg QD  gabapentin 300mg qhs - helpful but causes dizziness    Previous medications trialed: -    Previous procedures relevant to complaint: Injections:   7/18/24 - B knee CSI 40mg per side.    6/27/24 - L piriformis injection    5/30/24 - L SIJ injection 20% improvement.    4/5/24 - B knee CSI 40mg per side    2/23/24 - B knee genvisc    2/16/24 -  B knee genvisc    2/9/24 -  B knee genvisc    2/2/24 -  B knee genvisc    1/25/24 -  B knee genvisc    2023 - L SIJ injections and lumbar RFAs with Dr. Dejesus  Surgery:   9/11/23 -  L5-S1 decompression and interbody fusion      Conservative therapy tried: Physical Therapy: + not helpful  HEP: difficult due to pain      Diagnostic studies reviewed by me: XR L spine 4/3/24 - posterior hardware intact, L4-L5 disc degeneration  MRI 8/29/23 - multilevel degenerative changes, grade 1 anterolisthesis of L5 on S1,

## 2024-07-29 ENCOUNTER — OUTPATIENT (OUTPATIENT)
Dept: OUTPATIENT SERVICES | Facility: HOSPITAL | Age: 79
LOS: 1 days | End: 2024-07-29
Payer: OTHER GOVERNMENT

## 2024-07-29 DIAGNOSIS — Z98.1 ARTHRODESIS STATUS: ICD-10-CM

## 2024-07-29 DIAGNOSIS — Z98.49 CATARACT EXTRACTION STATUS, UNSPECIFIED EYE: Chronic | ICD-10-CM

## 2024-07-29 DIAGNOSIS — Z98.890 OTHER SPECIFIED POSTPROCEDURAL STATES: Chronic | ICD-10-CM

## 2024-07-29 PROCEDURE — 72100 X-RAY EXAM L-S SPINE 2/3 VWS: CPT | Mod: 26

## 2024-07-30 NOTE — ED PROVIDER NOTE - CHIEF COMPLAINT
[Comprehensive Assessment Reviewed] : Comprehensive assessment reviewed [No Action Needed] : No action needed The patient is a 77y Male complaining of abdominal pain.

## 2024-08-05 ENCOUNTER — NON-APPOINTMENT (OUTPATIENT)
Age: 79
End: 2024-08-05

## 2024-08-06 ENCOUNTER — APPOINTMENT (OUTPATIENT)
Dept: NEUROSURGERY | Facility: CLINIC | Age: 79
End: 2024-08-06

## 2024-08-06 PROCEDURE — 99214 OFFICE O/P EST MOD 30 MIN: CPT

## 2024-08-06 NOTE — HISTORY OF PRESENT ILLNESS
[de-identified] : Mr. Whitehead is a very pleasant 78-year-old gentleman who is now approximately 1 year status post L5-S1 decompression and interbody fusion on 9/11/2023 who presents for routine follow-up.  He reports that he continues to do well from the standpoint of radiculopathy and denies any shooting pain down his legs or any difficulties with his legs at this point.  He continues to have some stiffness in his lower back which seems to be worsened with prolonged sitting and somewhat improved when standing and walking.  He does report that prolonged walking also seems to exacerbate the back pain and stiffness.  He recently underwent a piriformis injection as well as SI joint injection with Dr. Bates which she reports unfortunate did not seem to significantly improve his pain.  He is currently seeing a massage therapist who he feels may be somewhat helping his pain.  He denies any new symptoms at this time.

## 2024-08-06 NOTE — PHYSICAL EXAM
[de-identified] : Patient is awake and alert.  Well appearing, well-nourished in no acute distress Patient is interactive and appropriate RUE 5/5 Deltoid/Biceps/Triceps/WE/WF//IO LUE 5/5 Deltoid/Biceps/Triceps/WE/WF//IO RLE 5/5 HF/KE/KF/DF/PF/EHL LLE 5/5 HF/KE/KF/DF/PF/EHL Sensation intact to light touch  Negative Johnson's bilaterally Negative clonus bilaterally Negative straight leg raise bilaterally  Narrow-based gait within normal limits reflexes 2+ Incision well healed  [de-identified] : X-Ray L Spine (PACS) 7/29/24: IMPRESSION: Stable intact previously seen L5-S1 level posterior spinal fusion hardware consisting of pedicle screws with interconnecting rods and interbody disc spacer implant at right lateral aspect of the disc space.  Unchanged vertebral body and disc space heights and alignment.  Unremarkable SI joints. Slightly narrowed appearing superomedial hip joint spaces.  Generalized osteopenia otherwise no discrete lytic or blastic lesions.  Atherosclerotic abdominal aortic calcifications again noted.

## 2024-08-06 NOTE — ASSESSMENT
[FreeTextEntry1] : Mr. Whitehead is a very pleasant 78-year-old gentleman who is now approximately 1 year status post L5-S1 decompression and interbody fusion on 9/11/2023 who presents now for routine follow-up.  He overall from the standpoint of radiculopathy is doing very well and denies any symptoms in his legs at this time.  He continues to have some stiffness in his lower back which seems to be worsened with prolonged sitting or walking.  He on exam remains 5/5 in bilateral extremities without any long tract findings or hyperreflexia and his incision is well-healed.  He is currently undergoing massage therapy for his low back pain and previously had trialed injections which did not seem to significant benefit his symptoms.  We discussed that the back pain often is multifactorial and would benefit from a multidisciplinary approach with both physical therapy and pain management as well as potentially massage therapy as he is doing.  We discussed having him trial physical therapy in addition to what he was doing at this time.  We reviewed his XR L-spine AP and lateral which demonstrates expected postoperative changes without any evidence of hardware failure or screw pullout.  We discussed that he should continue with conservative management for his lower back at this time.  We discussed having him follow-up in 3 months time to assess his interval progress.  All questions were answered.  Plan: - Referral placed to physical therapy  - Continue with pain management, possible trigger point injections  - Continue with medical massage therapy  - Follow up in 3 months to evaluate symptoms

## 2024-08-20 ENCOUNTER — OUTPATIENT (OUTPATIENT)
Dept: OUTPATIENT SERVICES | Facility: HOSPITAL | Age: 79
LOS: 1 days | End: 2024-08-20

## 2024-08-20 ENCOUNTER — APPOINTMENT (OUTPATIENT)
Dept: NEUROSURGERY | Facility: CLINIC | Age: 79
End: 2024-08-20
Payer: COMMERCIAL

## 2024-08-20 VITALS
DIASTOLIC BLOOD PRESSURE: 73 MMHG | SYSTOLIC BLOOD PRESSURE: 168 MMHG | WEIGHT: 165 LBS | HEIGHT: 71 IN | OXYGEN SATURATION: 99 % | HEART RATE: 56 BPM | BODY MASS INDEX: 23.1 KG/M2 | TEMPERATURE: 97.9 F

## 2024-08-20 DIAGNOSIS — Z98.49 CATARACT EXTRACTION STATUS, UNSPECIFIED EYE: Chronic | ICD-10-CM

## 2024-08-20 DIAGNOSIS — M54.50 LOW BACK PAIN, UNSPECIFIED: ICD-10-CM

## 2024-08-20 PROCEDURE — 72100 X-RAY EXAM L-S SPINE 2/3 VWS: CPT | Mod: 26

## 2024-08-20 PROCEDURE — 99214 OFFICE O/P EST MOD 30 MIN: CPT

## 2024-08-20 RX ORDER — METHOCARBAMOL 750 MG/1
750 TABLET, FILM COATED ORAL 3 TIMES DAILY
Qty: 42 | Refills: 0 | Status: ACTIVE | COMMUNITY
Start: 2024-08-20 | End: 1900-01-01

## 2024-08-20 NOTE — HISTORY OF PRESENT ILLNESS
[FreeTextEntry1] : Mr. Whitehead is a very pleasant 78-year-old gentleman status post L5-S1 decompression and interbody fusion on 9/11/2023 who presents for routine follow-up. Patient states pain in his left-side of his low back for 4 days, which radiates down his left buttock to the lateral left leg to the top of the left foot, without recent inciting injury. He also states some numbness in the left foot. Yesterday he went to [facility] yesterday for worsening of his low back pain, and states he had a Mortin injection yesterday, which helped a little. He was also prescribed Methocarbamol 750mg 3x/day, which he started taking yesterday, and is not sure if it helps yet. Currently he rates his pain 9/10. Sitting, walking, and laying flat worsen the pain. Laying on his side helps. Medical massages have been helping a little. He denies any recent trigger point injections or physical therapy. He previously underwent a piriformis injection as well as SI joint injection with Dr. Bates which did not seem to significantly help. He denies any bowel bladder dysfunction, no neurological dysfunction or saddle anesthesia.

## 2024-08-20 NOTE — ASSESSMENT
[FreeTextEntry1] : Mr. Whitehead is a very pleasant 78-year-old gentleman status post L5-S1 decompression and interbody fusion on 9/11/2023 who presents 4-day history of worsening low back pain and occasional left lower extremity radiculopathy likely in an L4 versus L5 distribution.  On exam he remains 5/5 in bilateral lower extremities without electric findings or hyperreflexia and has a negative straight leg raise.  We discussed that given that it is approximately been 1 year status post his previous surgery and given his radiculopathy in the left leg, he would likely benefit from a MRI L-spine without contrast to assess for any interval changes in his lumbar spine including any new nerve root compression which could be contributing to his pain.  We discussed he would also benefit from an evaluation by pain management as well as an XR L-spine AP and lateral to better assess the hardware.  We discussed having him follow-up in a week or 2 after the MRI to discuss imaging results and next steps.  All questions were answered.  Plan: -XR L-spine AP and lateral  -MRI L-spine  -Pain management -Follow up in 1 week  -Prescribe methocarbomol 750 tid prn

## 2024-08-20 NOTE — PHYSICAL EXAM
[FreeTextEntry1] : Patient is awake and alert. Well appearing, well-nourished in no acute distress Patient is interactive and appropriate RUE 5/5 Deltoid/Biceps/Triceps/WE/WF//IO LUE 5/5 Deltoid/Biceps/Triceps/WE/WF//IO RLE 5/5 HF/KE/KF/DF/PF/EHL LLE 4+/5 HF/KE/KF/DF/PF/EHL Sensation intact to light touch Negative Johnson's bilaterally Negative clonus bilaterally Negative straight leg raise bilaterally Narrow-based gait within normal limits reflexes 2+

## 2024-08-22 ENCOUNTER — APPOINTMENT (OUTPATIENT)
Dept: PHYSICAL MEDICINE AND REHAB | Facility: CLINIC | Age: 79
End: 2024-08-22
Payer: COMMERCIAL

## 2024-08-22 VITALS
HEIGHT: 71 IN | SYSTOLIC BLOOD PRESSURE: 194 MMHG | BODY MASS INDEX: 23.38 KG/M2 | RESPIRATION RATE: 15 BRPM | WEIGHT: 167 LBS | DIASTOLIC BLOOD PRESSURE: 80 MMHG | HEART RATE: 52 BPM

## 2024-08-22 DIAGNOSIS — Z98.890 OTHER SPECIFIED POSTPROCEDURAL STATES: ICD-10-CM

## 2024-08-22 DIAGNOSIS — G62.9 POLYNEUROPATHY, UNSPECIFIED: ICD-10-CM

## 2024-08-22 DIAGNOSIS — M53.3 SACROCOCCYGEAL DISORDERS, NOT ELSEWHERE CLASSIFIED: ICD-10-CM

## 2024-08-22 DIAGNOSIS — Z98.1 ARTHRODESIS STATUS: ICD-10-CM

## 2024-08-22 DIAGNOSIS — M79.18 MYALGIA, OTHER SITE: ICD-10-CM

## 2024-08-22 DIAGNOSIS — M67.952 UNSPECIFIED DISORDER OF SYNOVIUM AND TENDON, LEFT THIGH: ICD-10-CM

## 2024-08-22 DIAGNOSIS — M54.16 RADICULOPATHY, LUMBAR REGION: ICD-10-CM

## 2024-08-22 DIAGNOSIS — G57.02 LESION OF SCIATIC NERVE, LEFT LOWER LIMB: ICD-10-CM

## 2024-08-22 PROCEDURE — G2211 COMPLEX E/M VISIT ADD ON: CPT

## 2024-08-22 PROCEDURE — 99214 OFFICE O/P EST MOD 30 MIN: CPT | Mod: 25

## 2024-08-22 PROCEDURE — 20552 NJX 1/MLT TRIGGER POINT 1/2: CPT

## 2024-08-22 RX ORDER — PREGABALIN 25 MG/1
25 CAPSULE ORAL
Qty: 60 | Refills: 0 | Status: ACTIVE | COMMUNITY
Start: 2024-08-22 | End: 1900-01-01

## 2024-08-22 NOTE — PHYSICAL EXAM
[FreeTextEntry1] : Constitutional: In NAD, calm and cooperative Heart: well perfused Lungs: nonlabored breathing MSK (Back) Inspection: +midline lumbar incision, skin approximated, no erythema, no swelling, no discharge.  Palpation: Tenderness of the left lower lumbar paraspinals and QL,  focal areas of hyperirritable, palpable, taut bands of muscles that reproduce pain referral pattern and twitch response with palpation,  mild TTP over left SIJ, mild TTP over left piriformis  ROM: Pain at end lumbar extension and rotation  Strength: 5/5 strength in bilateral lower extremities Reflexes: 2+ Patella reflex bilaterally, 2+ Achilles reflex bilaterally, negative clonus bilaterally Sensation: Intact to light touch in bilateral lower extremities Special tests: SLR: negative BL CK: negative BL FADIR: +caused pain across left buttock  pelvic compression:  negative BL Thigh Thrust:  mild positive L Stacy's Finger:   mild positive L Facet loading: negative BL

## 2024-08-22 NOTE — HISTORY OF PRESENT ILLNESS
[FreeTextEntry1] : Mr. JESUS GRAY is a 78 year male who presents with back pain  Follows with NSGY (Dr You) for LBP and Ortho (Dr. Ibanez) for knee pain  PMH hx of DVT, chronic lbp s/p L5-S1 decompression and interbody fusion on 9/11/23, shoulder pain, T2DM (HA1C 7% on 5/18/23)   Referred by Dr You for consideration of L SIJ injection   interval 08/22/2024  Mr. JESUS GRAY is a 78 year male presents for follow up for low back/buttock pain. He is here for post-injection eval after 6/27/24 L piriformis injection. He reports minimal improvement from this injection. Continues to have left sided low back pain, that radiates down his posterior LE into his foot. Pain is 8-9/10.  There is associated numbness down the back of his LLE. He has difficulty sleeping at night. Also complains of feeling tight and spasms in his low back and left buttock.  He was prescribed methocarbamol by the ER on Wednesday, taking 750mg TID with mild improvement.  Denies any associated leg weakness. Denies any loss of bowel/bladder control or any groin numbness. Is pending repeat MR L spine ordered by Dr. Pollard.    Interval 06/11/2024  Mr. JESUS GRAY is a 78 year male presents for follow up for L low back pain 2/2 SIJ pain. He is here for post-injection eval after left SIJ injection on 5/30/24. He states that the sharp pain in his left low back is mostly gone, but he continues to have pain in the middle of his buttock, rated 5-6/10. Pain radiates down his posterior/lateral left thigh up to the knee. Has chronic numbness in his left foot, which preceded his fusion. He notes that the gabapentin was helpful for his pain, but 300mg dose was causing dizziness and drowsiness. Pain is worse with sitting, leaning and putting pressure on his left buttock, with prolonged standing. Denies any new numbness. Denies any associated leg weakness. Denies any loss of bowel/bladder control or any groin numbness.   HPI  05/16/2024 Location: left low back/buttock  Onset: 2 weeks ago, denies inciting injury or trauma.  Provocation/Palliative: worse with standing in one position  Quality: sharp, shooting Radiation: LLE lateral thigh across shin and down to toes. has tingling in BL feet 2/2 diabetic peripheral neuropathy  Severity:  6-7/10   Denies any associated numbness. Denies any associated leg weakness. Denies any loss of bowel/bladder control or any groin numbness.   Current pain medications: tylenol 1000mg QD  gabapentin 300mg qhs - helpful but causes dizziness    Previous medications trialed: -    Previous procedures relevant to complaint: Injections:   6/27/24 L piriformis injection.    5/30/24 - L SIJ injection 20% improvement.    4/5/24 - B knee CSI 40mg per side    2/23/24 - B knee genvisc    2/16/24 -  B knee genvisc    2/9/24 -  B knee genvisc    2/2/24 -  B knee genvisc    1/25/24 -  B knee genvisc    2023 - L SIJ injections and lumbar RFAs with Dr. Dejesus  Surgery:   9/11/23 -  L5-S1 decompression and interbody fusion      Conservative therapy tried: Physical Therapy: + not helpful  HEP: difficult due to pain      Diagnostic studies reviewed by me: XR L spine 4/3/24 - posterior hardware intact, L4-L5 disc degeneration  MRI 8/29/23 - multilevel degenerative changes, grade 1 anterolisthesis of L5 on S1,

## 2024-08-22 NOTE — DATA REVIEWED
[MRI] : MRI [FreeTextEntry1] : 	 EXAM: [id number] - XR LS SPINE AP LAT 2-3 VIEWS  - ORDERED BY: SEYMOUR KWAN   PROCEDURE DATE:  04/03/2024    INTERPRETATION:  Clinical indication: Back pain  Technique: 2 views of the lumbar spine  Comparison: 3/8/2024  Findings: Patient is status post posterior spinal fusion from L5-S1 using paired bilateral vertical rods secured by bilateral transpedicular screws. There is an interbody fusion device at L5-S1 as well. No evidence for hardware complication.  There is no significant loss of vertebral body height. There is no significant listhesis or scoliosis. There is multilevel discogenic degenerative disease of the lumbar spine as manifested by disc space narrowing and endplate osteophytosis, most pronounced at L4-L5.  Impression:  Posterior spinal fusion from L5 to S1 without hardware complication.  Discogenic degenerative disease, most pronounced at L4-L5.  --- End of Report ---       SHARITA MYERS MD; Attending Radiologist This document has been electronically signed. Apr 5 2024  3:26PM  EXAM: [id number] - MR SPINE LUMBAR  - ORDERED BY: ANNE MARIE WILSON   PROCEDURE DATE:  08/29/2023    INTERPRETATION:  CLINICAL INFORMATION: Lower back pain  ADDITIONAL CLINICAL INFORMATION: Other Condition see Clinical Info  TECHNIQUE: Multiplanar, multisequence MRI was performed of the lumbar spine. IV Contrast: NONE  PRIOR STUDIES: Radiographs dated 8/21/2023  FINDINGS:  LOCALIZER: No additional findings. BONES: 5 lumbar vertebral bodies are assumed. Chronic mild compression fracture of L5. No acute fracture. Diminutive appearance of the left pars interarticularis of L5. Posterior decompression at L5-S1. ALIGNMENT: Grade 1 anterolisthesis of L5 on S1. SACROILIAC JOINTS/SACRUM: Mild degenerative changes of the visualized superior sacroiliac joints. CONUS AND CAUDA EQUINA: The distal cord and conus are normal in signal. Conus terminates at L2. VISUALIZED INTRAPELVIC/INTRA-ABDOMINAL SOFT TISSUES: Tiny right-sided renal cysts. PARASPINAL SOFT TISSUES: Mild fatty atrophy of the posterior paraspinal musculature.  INDIVIDUAL LEVELS:  T12-L1: No canal or neuroforaminal stenosis. L1-L2: Minimal disc bulge. No canal or neuroforaminal stenosis. L2-L3: Minimal disc bulge. Mild right facet arthrosis. No canal or neuroforaminal stenosis. L3-L4: Minimal disc bulge. Mild left facet arthrosis. Mild bilateral neural foraminal narrowing. No central canal stenosis. L4-L5: Disc bulge. Mild bilateral facet arthrosis. Mild/moderate bilateral neural foraminal narrowing. No central canal stenosis. L5-S1: Posterior decompression. Uncovering of the posterior disc space with disc bulge. Severe bilateral facet arthrosis.  Severe bilateral neural foraminal narrowing. No central canal stenosis.  IMPRESSION:  Status post posterior decompression at L5-S1.  Multilevel degenerative changes throughout the lumbar spine, as detailed above, worst at L5-S1.  Grade 1 anterolisthesis of L5 on S1. Diminutive appearance of the left pars interarticularis of L5, which may represent a pars defect. Consider further evaluation with lumbar spine CT.  Chronic mild compression fracture of L5.  --- End of Report ---       MARKEL BEDOYA M.D., ATTENDING RADIOLOGIST This document has been electronically signed. Aug 31 2023 12:29PM

## 2024-08-22 NOTE — ASSESSMENT
[FreeTextEntry1] : Mr. JESUS GRAY is a 78 year male who presents with left back/buttock pain, of note is s/p lumbar decompression and fusion 9/2023. Etiology of his pain is likely multifactorial due to ?S1 lumbar radiculopathy, myofascial pain, left SIJ pain and left piriformis pain.   Patient reports overall mild improvement of his left SIJ pain and left piriformis pain. Upon palpation of these two regions, he reports pain is less severe. Today, he complains of pain along his lumbar paraspinals, QL and along the posterior aspect of his sacrum that seems to radiate down his posterior LLE to his foot. Pain is consistent with myofascial pain and possible S1 radiculopathy. He is pending a repeat MRI of his lumbar spine for further evaluation. Recommending trigger point injections for his myofascial pain, and a trial of pregabalin for his possible lumbar radiculopathy.  Patient will follow up after MR L spine to discuss if RONALD is indicated.     Impression: lumbar myofascial pain ?S1 lumbar radiculopathy left piriformis syndrome  low back pain left SIJ pain s/p lumbar fusion  peripheral neuropathy    Plan:  - TPI performed, procedure note above  - XR L spine reviewed, final report pending.  - Agree with MR L spine as ordered by Dr. Pollard  - continue tylenol 1000mg BID PRN  - stop gabapentin - start pregabalin 25mg qhs x7 days, then increase to 50mg qhs, rx sent.  - continue methocarbamol [id number] - R/B/A of lumbar RONALD discussed, will wait for MR L spine results prior to consideration of further spinal injections.  - RTC in 1 month   This patient is being managed for a complex chronic pain that requires ongoing medical management. The nature of this condition requires a longitudinal relationship and monitoring over time for appropriate treatment.  The patient expressed verbal understanding and is in agreement with the plan of care. All of the patient's questions and concerns were addressed during today's visit.

## 2024-08-22 NOTE — PROCEDURE
[de-identified] : Procedure: Trigger point injection Preoperative Diagnosis: Myofascial Pain Post Procedure Diagnosis: Myofascial Pain Findings: Radiating trigger points Complications: None Anesthetic: Lidocaine 1% 0.5-1 ml per site Indication:  hx of myofascial pain.  Informed consent and Time Out performed prior to initiation of procedure. Technical details: Sterilization with alcohol to skin sites. Injection into the trigger point with 1.25 inch 27G needle using needling technique to elicit twitch response. Muscle groups: left lumbar paraspinals, left QL Total injections: 4 Pre-procedure pain: 8/10 Post-procedure pain: 2-3/10  Dispo: The patient tolerated the procedure well.

## 2024-08-26 ENCOUNTER — APPOINTMENT (OUTPATIENT)
Dept: PHYSICAL MEDICINE AND REHAB | Facility: CLINIC | Age: 79
End: 2024-08-26

## 2024-08-27 ENCOUNTER — NON-APPOINTMENT (OUTPATIENT)
Age: 79
End: 2024-08-27

## 2024-08-28 ENCOUNTER — APPOINTMENT (OUTPATIENT)
Dept: ORTHOPEDIC SURGERY | Facility: CLINIC | Age: 79
End: 2024-08-28
Payer: MEDICARE

## 2024-08-28 PROCEDURE — 20610 DRAIN/INJ JOINT/BURSA W/O US: CPT | Mod: 50

## 2024-08-28 NOTE — HISTORY OF PRESENT ILLNESS
[de-identified] : Oracio is a 78-year-old male does present today for follow-up evaluation of bilateral knee osteoarthritis.  He reports intermittent, moderate, right greater than left diffuse dull aching pain about both knees.  Associated with mild stiffness in both knees he does report intermittent swelling in the right knee.  He denies catching, locking or buckling. He completed gel injection on 2/23/24. These unfortunately did not work well. He then had a steroid injection bilaterally on 7/18/24 with good relief. He is hopeful to discuss gel injections.

## 2024-08-28 NOTE — DISCUSSION/SUMMARY
[de-identified] : Oracio is a 78-year-old male with a past medical history significant for non-insulin-dependent diabetes who present today for follow-up evaluation of bilateral knee medial compartment osteoarthritis, advanced in the right knee, moderate in the left knee. Patient was advised that he will need TKR but would like to hold off for now. Patient received the first of five gel injections and tolerated well. Follow up in one week.

## 2024-08-28 NOTE — PHYSICAL EXAM
[de-identified] : Multi body exam  The patient appears well nourished and in no apparent distress. The patient is alert and oriented to person, place, and time. Affect and mood appear normal. The head is normocephalic and atraumatic. The eyes reveal normal sclera and extra ocular muscles are intact. The tongue is midline with no apparent lesions. Skin shows normal turgor with no evidence of eczema or psoriasis. No respiratory distress noted. Sensation grossly intact.    [de-identified] : Right knee exam shows mild effusion, ROM is 0-120 degrees, no instability, no pain with Phyllis, medial joint line tenderness. Left knee exam shows no effusion, ROM is 0-120 degrees, no instability, no pain with Phyllis, medial joint line tenderness. [de-identified] : Prior xray 4 views of the bilateral knee demonstrate bone on bone medial compartment OA of the right knee and moderate OA of the left knee.

## 2024-08-28 NOTE — REASON FOR VISIT
[Follow-Up Visit] : a follow-up visit for [Other: ____] : [unfilled] [FreeTextEntry2] : Genvisc Bilateral knees LOT #T-7 Exp 2026/09/30

## 2024-08-28 NOTE — PROCEDURE
[de-identified] : Allergies: The patient denies allergies to medications and has no allergies to chicken,eggs, or feathers. Procedure: The patient has been identified by name and date of birth. Patient confirms that we are treating the bilateral knees today. The knees were prepped in the usual sterile fashion. The areas were cleansed with chlorhexadine, then sprayed with ethyl chloride. The patient was then injected with the Genvisc into the bilateral knees in the anterior position. The patient tolerated the procedure well. The medication was delivered aseptically and atraumatically. Diagnosis: Osteoarthritis of the bilateral knees Treatment: The patient was advised on the activities for today. I gave the patient instructions on postinjection ice and analgesia.

## 2024-09-04 ENCOUNTER — APPOINTMENT (OUTPATIENT)
Dept: ORTHOPEDIC SURGERY | Facility: CLINIC | Age: 79
End: 2024-09-04
Payer: MEDICARE

## 2024-09-04 VITALS — BODY MASS INDEX: 23.38 KG/M2 | WEIGHT: 167 LBS | HEIGHT: 71 IN

## 2024-09-04 PROCEDURE — 20610 DRAIN/INJ JOINT/BURSA W/O US: CPT | Mod: 50

## 2024-09-04 NOTE — HISTORY OF PRESENT ILLNESS
[de-identified] : The patient is here today for Genvisc injection for the bilateral knees.  Allergies: The patient denies allergies to medications and has no allergies to chicken,eggs, or feathers. Procedure: The patient has been identified by name and date of birth. Patient confirms that we are treating the bilateral knees today. The knees were prepped in the usual sterile fashion. The areas were cleansed with chlorhexadine, then sprayed with ethyl chloride. The patient was then injected with the Genvisc into the bilateral knees in the anterior position. The patient tolerated the procedure well. The medication was delivered aseptically and atraumatically. Diagnosis: Osteoarthritis of the bilateral knees Treatment: The patient was advised on the activities for today. I gave the patient instructions on postinjection ice and analgesia. Follow up recommended in one week

## 2024-09-11 ENCOUNTER — APPOINTMENT (OUTPATIENT)
Dept: ORTHOPEDIC SURGERY | Facility: CLINIC | Age: 79
End: 2024-09-11

## 2024-09-11 DIAGNOSIS — M17.0 BILATERAL PRIMARY OSTEOARTHRITIS OF KNEE: ICD-10-CM

## 2024-09-11 PROCEDURE — 20610 DRAIN/INJ JOINT/BURSA W/O US: CPT | Mod: 50

## 2024-09-11 NOTE — REASON FOR VISIT
[Follow-Up Visit] : a follow-up visit for [Other: ____] : [unfilled] [FreeTextEntry2] : Genvisc Bilateral knees #3 LOT #T-7 Exp 2026/09/30

## 2024-09-11 NOTE — HISTORY OF PRESENT ILLNESS
[de-identified] : The patient is here today for Genvisc injection for the bilateral knees.  Allergies: The patient denies allergies to medications and has no allergies to chicken,eggs, or feathers. Procedure: The patient has been identified by name and date of birth. Patient confirms that we are treating the bilateral knees today. The knees were prepped in the usual sterile fashion. The areas were cleansed with chlorhexadine, then sprayed with ethyl chloride. The patient was then injected with the Genvisc into the bilateral knees in the anterior position. The patient tolerated the procedure well. The medication was delivered aseptically and atraumatically. Diagnosis: Osteoarthritis of the bilateral knees Treatment: The patient was advised on the activities for today. I gave the patient instructions on postinjection ice and analgesia. Follow up recommended in one week

## 2024-09-18 ENCOUNTER — APPOINTMENT (OUTPATIENT)
Dept: MRI IMAGING | Facility: CLINIC | Age: 79
End: 2024-09-18

## 2024-09-23 ENCOUNTER — APPOINTMENT (OUTPATIENT)
Dept: PHYSICAL MEDICINE AND REHAB | Facility: CLINIC | Age: 79
End: 2024-09-23

## 2024-09-25 NOTE — HISTORY OF PRESENT ILLNESS
[FreeTextEntry1] : Mr. JESUS GRAY is a 78 year male who presents with back pain  Follows with NSGY (Dr You) for LBP and Ortho (Dr. Ibanez) for knee pain  PMH hx of DVT, chronic lbp s/p L5-S1 decompression and interbody fusion on 9/11/23, shoulder pain, T2DM (HA1C 7% on 5/18/23)   Referred by Dr You for consideration of L SIJ injection   Interval 09/23/2024  Mr. JESUS GRAY is a 78 year male presents for follow up for low back/buttock and LLE pain. At last visit, lumbar TPI was performed, patient was pending MR DREA spine. Methocarbamol was continued, and a trial of pregabalin was started.    interval 08/22/2024  Mr. JESUS GRAY is a 78 year male presents for follow up for low back/buttock pain. He is here for post-injection eval after 6/27/24 L piriformis injection. He reports minimal improvement from this injection. Continues to have left sided low back pain, that radiates down his posterior LE into his foot. Pain is 8-9/10.  There is associated numbness down the back of his LLE. He has difficulty sleeping at night. Also complains of feeling tight and spasms in his low back and left buttock.  He was prescribed methocarbamol by the ER on Wednesday, taking 750mg TID with mild improvement.  Denies any associated leg weakness. Denies any loss of bowel/bladder control or any groin numbness. Is pending repeat MR L spine ordered by Dr. Pollard.    Interval 06/11/2024  Mr. JESUS GRAY is a 78 year male presents for follow up for L low back pain 2/2 SIJ pain. He is here for post-injection eval after left SIJ injection on 5/30/24. He states that the sharp pain in his left low back is mostly gone, but he continues to have pain in the middle of his buttock, rated 5-6/10. Pain radiates down his posterior/lateral left thigh up to the knee. Has chronic numbness in his left foot, which preceded his fusion. He notes that the gabapentin was helpful for his pain, but 300mg dose was causing dizziness and drowsiness. Pain is worse with sitting, leaning and putting pressure on his left buttock, with prolonged standing. Denies any new numbness. Denies any associated leg weakness. Denies any loss of bowel/bladder control or any groin numbness.   HPI  05/16/2024 Location: left low back/buttock  Onset: 2 weeks ago, denies inciting injury or trauma.  Provocation/Palliative: worse with standing in one position  Quality: sharp, shooting Radiation: LLE lateral thigh across shin and down to toes. has tingling in BL feet 2/2 diabetic peripheral neuropathy  Severity:  6-7/10   Denies any associated numbness. Denies any associated leg weakness. Denies any loss of bowel/bladder control or any groin numbness.   Current pain medications: tylenol 1000mg QD  gabapentin 300mg qhs - helpful but causes dizziness    Previous medications trialed: -    Previous procedures relevant to complaint: Injections:   8/22/24 - lumbar TPI    6/27/24 L piriformis injection.    5/30/24 - L SIJ injection 20% improvement.    4/5/24 - B knee CSI 40mg per side    2/23/24 - B knee genvisc    2/16/24 -  B knee genvisc    2/9/24 -  B knee genvisc    2/2/24 -  B knee genvisc    1/25/24 -  B knee genvisc    2023 - L SIJ injections and lumbar RFAs with Dr. Dejesus  Surgery:   9/11/23 -  L5-S1 decompression and interbody fusion      Conservative therapy tried: Physical Therapy: + not helpful  HEP: difficult due to pain      Diagnostic studies reviewed by me: XR L spine 4/3/24 - posterior hardware intact, L4-L5 disc degeneration  MRI 8/29/23 - multilevel degenerative changes, grade 1 anterolisthesis of L5 on S1,

## 2024-09-25 NOTE — PROCEDURE
[de-identified] : Procedure: Trigger point injection Preoperative Diagnosis: Myofascial Pain Post Procedure Diagnosis: Myofascial Pain Findings: Radiating trigger points Complications: None Anesthetic: Lidocaine 1% 0.5-1 ml per site Indication:  hx of myofascial pain.  Informed consent and Time Out performed prior to initiation of procedure. Technical details: Sterilization with alcohol to skin sites. Injection into the trigger point with 1.25 inch 27G needle using needling technique to elicit twitch response. Muscle groups: left lumbar paraspinals, left QL Total injections: 4 Pre-procedure pain: 8/10 Post-procedure pain: 2-3/10  Dispo: The patient tolerated the procedure well.

## 2024-09-25 NOTE — DATA REVIEWED
[MRI] : MRI [FreeTextEntry1] : 	 EXAM: [id number] - XR LS SPINE AP LAT 2-3 VIEWS  - ORDERED BY: KHADIJAH UNDERWOOD   PROCEDURE DATE:  08/20/2024    INTERPRETATION:  Clinical indication: Lower back pain with left lower extremity radiculopathy.  2 views of the lumbar spine were performed.  Comparison is made to July 29, 2024.  IMPRESSION:  Redemonstration of instrumented circumferential spinal fusion at L5-S1. Unchanged diffuse anterolisthesis of L5 on S1. Lumbar lordosis is maintained. Vertebral body heights are intact. Disc heights are preserved. Marginal osteophyte formation at L2-L3.  Mild bilateral hip arthrosis.  --- End of Report ---       ILA JIMENEZ MD; Attending Radiologist This document has been electronically signed. Aug 27 2024  5:20PM 	 EXAM: [id number] - XR LS SPINE AP LAT 2-3 VIEWS  - ORDERED BY: SEYMOUR KWAN   PROCEDURE DATE:  04/03/2024    INTERPRETATION:  Clinical indication: Back pain  Technique: 2 views of the lumbar spine  Comparison: 3/8/2024  Findings: Patient is status post posterior spinal fusion from L5-S1 using paired bilateral vertical rods secured by bilateral transpedicular screws. There is an interbody fusion device at L5-S1 as well. No evidence for hardware complication.  There is no significant loss of vertebral body height. There is no significant listhesis or scoliosis. There is multilevel discogenic degenerative disease of the lumbar spine as manifested by disc space narrowing and endplate osteophytosis, most pronounced at L4-L5.  Impression:  Posterior spinal fusion from L5 to S1 without hardware complication.  Discogenic degenerative disease, most pronounced at L4-L5.  --- End of Report ---       SHARITA MYERS MD; Attending Radiologist This document has been electronically signed. Apr 5 2024  3:26PM  EXAM: [id number] - MR SPINE LUMBAR  - ORDERED BY: ANNE MARIE WILSON   PROCEDURE DATE:  08/29/2023    INTERPRETATION:  CLINICAL INFORMATION: Lower back pain  ADDITIONAL CLINICAL INFORMATION: Other Condition see Clinical Info  TECHNIQUE: Multiplanar, multisequence MRI was performed of the lumbar spine. IV Contrast: NONE  PRIOR STUDIES: Radiographs dated 8/21/2023  FINDINGS:  LOCALIZER: No additional findings. BONES: 5 lumbar vertebral bodies are assumed. Chronic mild compression fracture of L5. No acute fracture. Diminutive appearance of the left pars interarticularis of L5. Posterior decompression at L5-S1. ALIGNMENT: Grade 1 anterolisthesis of L5 on S1. SACROILIAC JOINTS/SACRUM: Mild degenerative changes of the visualized superior sacroiliac joints. CONUS AND CAUDA EQUINA: The distal cord and conus are normal in signal. Conus terminates at L2. VISUALIZED INTRAPELVIC/INTRA-ABDOMINAL SOFT TISSUES: Tiny right-sided renal cysts. PARASPINAL SOFT TISSUES: Mild fatty atrophy of the posterior paraspinal musculature.  INDIVIDUAL LEVELS:  T12-L1: No canal or neuroforaminal stenosis. L1-L2: Minimal disc bulge. No canal or neuroforaminal stenosis. L2-L3: Minimal disc bulge. Mild right facet arthrosis. No canal or neuroforaminal stenosis. L3-L4: Minimal disc bulge. Mild left facet arthrosis. Mild bilateral neural foraminal narrowing. No central canal stenosis. L4-L5: Disc bulge. Mild bilateral facet arthrosis. Mild/moderate bilateral neural foraminal narrowing. No central canal stenosis. L5-S1: Posterior decompression. Uncovering of the posterior disc space with disc bulge. Severe bilateral facet arthrosis.  Severe bilateral neural foraminal narrowing. No central canal stenosis.  IMPRESSION:  Status post posterior decompression at L5-S1.  Multilevel degenerative changes throughout the lumbar spine, as detailed above, worst at L5-S1.  Grade 1 anterolisthesis of L5 on S1. Diminutive appearance of the left pars interarticularis of L5, which may represent a pars defect. Consider further evaluation with lumbar spine CT.  Chronic mild compression fracture of L5.  --- End of Report ---       MARKEL BEDOYA M.D., ATTENDING RADIOLOGIST This document has been electronically signed. Aug 31 2023 12:29PM

## 2024-09-26 ENCOUNTER — APPOINTMENT (OUTPATIENT)
Dept: ORTHOPEDIC SURGERY | Facility: CLINIC | Age: 79
End: 2024-09-26
Payer: MEDICARE

## 2024-09-26 VITALS — HEIGHT: 71 IN | WEIGHT: 169 LBS | BODY MASS INDEX: 23.66 KG/M2

## 2024-09-26 PROCEDURE — 20610 DRAIN/INJ JOINT/BURSA W/O US: CPT | Mod: 50

## 2024-09-26 NOTE — HISTORY OF PRESENT ILLNESS
[de-identified] : The patient is here today for Genvisc injection for the bilateral knees.  Allergies: The patient denies allergies to medications and has no allergies to chicken,eggs, or feathers. Procedure: The patient has been identified by name and date of birth. Patient confirms that we are treating the bilateral knees today. The knees were prepped in the usual sterile fashion. The areas were cleansed with chlorhexadine, then sprayed with ethyl chloride. The patient was then injected with the Genvisc into the bilateral knees in the anterior position. The patient tolerated the procedure well. The medication was delivered aseptically and atraumatically. Diagnosis: Osteoarthritis of the bilateral knees Treatment: The patient was advised on the activities for today. I gave the patient instructions on postinjection ice and analgesia. Follow up recommended in one week

## 2024-10-04 ENCOUNTER — APPOINTMENT (OUTPATIENT)
Dept: ORTHOPEDIC SURGERY | Facility: CLINIC | Age: 79
End: 2024-10-04
Payer: MEDICARE

## 2024-10-04 VITALS — WEIGHT: 169 LBS | HEIGHT: 71 IN | BODY MASS INDEX: 23.66 KG/M2

## 2024-10-04 DIAGNOSIS — M17.0 BILATERAL PRIMARY OSTEOARTHRITIS OF KNEE: ICD-10-CM

## 2024-10-04 PROCEDURE — 20610 DRAIN/INJ JOINT/BURSA W/O US: CPT | Mod: 50

## 2024-10-04 NOTE — REASON FOR VISIT
[Follow-Up Visit] : a follow-up visit for [FreeTextEntry2] : Genvisc Bilateral knees #5 LOT #T-7 Exp 2026/09/30.

## 2024-10-04 NOTE — HISTORY OF PRESENT ILLNESS
[de-identified] : The patient is here today for Genvisc injection for the bilateral knees.  Allergies: The patient denies allergies to medications and has no allergies to chicken,eggs, or feathers. Procedure: The patient has been identified by name and date of birth. Patient confirms that we are treating the bilateral knees today. The knees were prepped in the usual sterile fashion. The areas were cleansed with chlorhexadine, then sprayed with ethyl chloride. The patient was then injected with the Genvisc into the bilateral knees in the anterior position. The patient tolerated the procedure well. The medication was delivered aseptically and atraumatically. Diagnosis: Osteoarthritis of the bilateral knees Treatment: The patient was advised on the activities for today. I gave the patient instructions on postinjection ice and analgesia. Follow up recommended in 6 weeks.

## 2024-10-10 PROBLEM — H52.13 MYOPIA ; BOTH EYES: Status: ACTIVE | Noted: 2024-10-10

## 2024-10-10 PROBLEM — H10.45 ALLERGIC CONJUNCTIVITIS ; BOTH EYES: Status: ACTIVE | Noted: 2024-10-10

## 2024-10-14 ENCOUNTER — APPOINTMENT (OUTPATIENT)
Dept: PHYSICAL MEDICINE AND REHAB | Facility: CLINIC | Age: 79
End: 2024-10-14

## 2024-10-15 ENCOUNTER — APPOINTMENT (OUTPATIENT)
Dept: PHYSICAL MEDICINE AND REHAB | Facility: CLINIC | Age: 79
End: 2024-10-15
Payer: MEDICARE

## 2024-10-15 VITALS
RESPIRATION RATE: 15 BRPM | BODY MASS INDEX: 23.8 KG/M2 | HEART RATE: 73 BPM | HEIGHT: 71 IN | SYSTOLIC BLOOD PRESSURE: 161 MMHG | DIASTOLIC BLOOD PRESSURE: 52 MMHG | WEIGHT: 170 LBS

## 2024-10-15 DIAGNOSIS — G57.02 LESION OF SCIATIC NERVE, LEFT LOWER LIMB: ICD-10-CM

## 2024-10-15 DIAGNOSIS — M54.16 RADICULOPATHY, LUMBAR REGION: ICD-10-CM

## 2024-10-15 DIAGNOSIS — M79.18 MYALGIA, OTHER SITE: ICD-10-CM

## 2024-10-15 DIAGNOSIS — M53.3 SACROCOCCYGEAL DISORDERS, NOT ELSEWHERE CLASSIFIED: ICD-10-CM

## 2024-10-15 DIAGNOSIS — Z98.890 OTHER SPECIFIED POSTPROCEDURAL STATES: ICD-10-CM

## 2024-10-15 DIAGNOSIS — Z98.1 ARTHRODESIS STATUS: ICD-10-CM

## 2024-10-15 PROCEDURE — 99204 OFFICE O/P NEW MOD 45 MIN: CPT

## 2024-10-15 RX ORDER — DICLOFENAC SODIUM 10 MG/G
1 GEL TOPICAL 3 TIMES DAILY
Qty: 3 | Refills: 5 | Status: ACTIVE | COMMUNITY
Start: 2024-10-15 | End: 1900-01-01

## 2024-10-21 ENCOUNTER — APPOINTMENT (OUTPATIENT)
Dept: NEUROSURGERY | Facility: CLINIC | Age: 79
End: 2024-10-21

## 2024-10-23 ENCOUNTER — APPOINTMENT (OUTPATIENT)
Dept: ORTHOPEDIC SURGERY | Facility: CLINIC | Age: 79
End: 2024-10-23
Payer: MEDICARE

## 2024-10-23 VITALS
HEIGHT: 71 IN | SYSTOLIC BLOOD PRESSURE: 156 MMHG | BODY MASS INDEX: 23.8 KG/M2 | DIASTOLIC BLOOD PRESSURE: 70 MMHG | WEIGHT: 170 LBS

## 2024-10-23 DIAGNOSIS — M17.0 BILATERAL PRIMARY OSTEOARTHRITIS OF KNEE: ICD-10-CM

## 2024-10-23 PROCEDURE — 20610 DRAIN/INJ JOINT/BURSA W/O US: CPT | Mod: 50

## 2024-10-23 PROCEDURE — 99213 OFFICE O/P EST LOW 20 MIN: CPT | Mod: 25

## 2024-11-09 ENCOUNTER — APPOINTMENT (OUTPATIENT)
Dept: MRI IMAGING | Facility: CLINIC | Age: 79
End: 2024-11-09

## 2024-11-13 ENCOUNTER — APPOINTMENT (OUTPATIENT)
Dept: ORTHOPEDIC SURGERY | Facility: CLINIC | Age: 79
End: 2024-11-13
Payer: MEDICARE

## 2024-11-13 ENCOUNTER — APPOINTMENT (OUTPATIENT)
Dept: ORTHOPEDIC SURGERY | Facility: CLINIC | Age: 79
End: 2024-11-13

## 2024-11-13 VITALS — BODY MASS INDEX: 23.8 KG/M2 | HEIGHT: 71 IN | WEIGHT: 170 LBS

## 2024-11-13 DIAGNOSIS — M17.0 BILATERAL PRIMARY OSTEOARTHRITIS OF KNEE: ICD-10-CM

## 2024-11-13 PROCEDURE — 99213 OFFICE O/P EST LOW 20 MIN: CPT

## 2024-11-13 PROCEDURE — G2211 COMPLEX E/M VISIT ADD ON: CPT

## 2024-11-26 ENCOUNTER — APPOINTMENT (OUTPATIENT)
Dept: PHYSICAL MEDICINE AND REHAB | Facility: CLINIC | Age: 79
End: 2024-11-26
Payer: COMMERCIAL

## 2024-11-26 VITALS
BODY MASS INDEX: 23.52 KG/M2 | DIASTOLIC BLOOD PRESSURE: 66 MMHG | WEIGHT: 168 LBS | SYSTOLIC BLOOD PRESSURE: 154 MMHG | HEART RATE: 57 BPM | HEIGHT: 71 IN | RESPIRATION RATE: 14 BRPM

## 2024-11-26 DIAGNOSIS — M79.18 MYALGIA, OTHER SITE: ICD-10-CM

## 2024-11-26 DIAGNOSIS — M54.16 RADICULOPATHY, LUMBAR REGION: ICD-10-CM

## 2024-11-26 DIAGNOSIS — M53.3 SACROCOCCYGEAL DISORDERS, NOT ELSEWHERE CLASSIFIED: ICD-10-CM

## 2024-11-26 DIAGNOSIS — M67.952 UNSPECIFIED DISORDER OF SYNOVIUM AND TENDON, LEFT THIGH: ICD-10-CM

## 2024-11-26 DIAGNOSIS — Z98.1 ARTHRODESIS STATUS: ICD-10-CM

## 2024-11-26 DIAGNOSIS — G57.02 LESION OF SCIATIC NERVE, LEFT LOWER LIMB: ICD-10-CM

## 2024-11-26 DIAGNOSIS — M17.0 BILATERAL PRIMARY OSTEOARTHRITIS OF KNEE: ICD-10-CM

## 2024-11-26 DIAGNOSIS — M25.561 PAIN IN RIGHT KNEE: ICD-10-CM

## 2024-11-26 DIAGNOSIS — G62.9 POLYNEUROPATHY, UNSPECIFIED: ICD-10-CM

## 2024-11-26 PROCEDURE — 99214 OFFICE O/P EST MOD 30 MIN: CPT

## 2024-12-04 ENCOUNTER — APPOINTMENT (OUTPATIENT)
Dept: ORTHOPEDIC SURGERY | Facility: CLINIC | Age: 79
End: 2024-12-04

## 2024-12-06 ENCOUNTER — APPOINTMENT (OUTPATIENT)
Dept: ORTHOPEDIC SURGERY | Facility: CLINIC | Age: 79
End: 2024-12-06

## 2025-01-02 ENCOUNTER — APPOINTMENT (OUTPATIENT)
Dept: PHYSICAL MEDICINE AND REHAB | Facility: GI CENTER | Age: 80
End: 2025-01-02

## 2025-01-07 ENCOUNTER — APPOINTMENT (OUTPATIENT)
Dept: CARDIOLOGY | Facility: CLINIC | Age: 80
End: 2025-01-07
Payer: MEDICARE

## 2025-01-07 ENCOUNTER — NON-APPOINTMENT (OUTPATIENT)
Age: 80
End: 2025-01-07

## 2025-01-07 VITALS
DIASTOLIC BLOOD PRESSURE: 64 MMHG | SYSTOLIC BLOOD PRESSURE: 138 MMHG | RESPIRATION RATE: 16 BRPM | HEIGHT: 71 IN | HEART RATE: 57 BPM | BODY MASS INDEX: 22.96 KG/M2 | WEIGHT: 164 LBS

## 2025-01-07 DIAGNOSIS — I82.409 ACUTE EMBOLISM AND THROMBOSIS OF UNSPECIFIED DEEP VEINS OF UNSPECIFIED LOWER EXTREMITY: ICD-10-CM

## 2025-01-07 DIAGNOSIS — I10 ESSENTIAL (PRIMARY) HYPERTENSION: ICD-10-CM

## 2025-01-07 DIAGNOSIS — I35.0 NONRHEUMATIC AORTIC (VALVE) STENOSIS: ICD-10-CM

## 2025-01-07 PROCEDURE — G2211 COMPLEX E/M VISIT ADD ON: CPT

## 2025-01-07 PROCEDURE — 93000 ELECTROCARDIOGRAM COMPLETE: CPT

## 2025-01-07 PROCEDURE — 99214 OFFICE O/P EST MOD 30 MIN: CPT

## 2025-01-09 ENCOUNTER — OFFICE (OUTPATIENT)
Dept: URBAN - METROPOLITAN AREA CLINIC 104 | Facility: CLINIC | Age: 80
Setting detail: OPHTHALMOLOGY
End: 2025-01-09
Payer: COMMERCIAL

## 2025-01-09 DIAGNOSIS — H01.001: ICD-10-CM

## 2025-01-09 DIAGNOSIS — H35.033: ICD-10-CM

## 2025-01-09 DIAGNOSIS — H01.002: ICD-10-CM

## 2025-01-09 DIAGNOSIS — H52.4: ICD-10-CM

## 2025-01-09 DIAGNOSIS — H00.11: ICD-10-CM

## 2025-01-09 PROBLEM — H01.004 BLEPHARITIS; RIGHT UPPER LID, LEFT UPPER LID , RIGHT LOWER LID, LEFT LOWER LID: Status: ACTIVE | Noted: 2025-01-09

## 2025-01-09 PROBLEM — H02.831 DERMATOCHALASIS; RIGHT UPPER LID, LEFT UPPER LID: Status: ACTIVE | Noted: 2025-01-09

## 2025-01-09 PROBLEM — H01.005 BLEPHARITIS; RIGHT UPPER LID, LEFT UPPER LID , RIGHT LOWER LID, LEFT LOWER LID: Status: ACTIVE | Noted: 2025-01-09

## 2025-01-09 PROBLEM — H02.834 DERMATOCHALASIS; RIGHT UPPER LID, LEFT UPPER LID: Status: ACTIVE | Noted: 2025-01-09

## 2025-01-09 PROCEDURE — 92015 DETERMINE REFRACTIVE STATE: CPT | Performed by: OPTOMETRIST

## 2025-01-09 PROCEDURE — 99214 OFFICE O/P EST MOD 30 MIN: CPT | Performed by: OPTOMETRIST

## 2025-01-09 ASSESSMENT — REFRACTION_AUTOREFRACTION
OS_CYLINDER: -0.50
OD_CYLINDER: -1.00
OD_AXIS: 052
OS_AXIS: 154
OD_SPHERE: +0.50
OS_SPHERE: +0.75

## 2025-01-09 ASSESSMENT — REFRACTION_MANIFEST
OD_CYLINDER: -0.75
OD_ADD: +2.50
OD_VA1: 20/20
OS_SPHERE: +0.75
OD_SPHERE: +0.25
OD_VA2: 20/20
OS_VA2: 20/20
OS_ADD: +2.50
OS_VA1: 20/20
OS_AXIS: 140
OS_CYLINDER: -1.00
OD_AXIS: 040

## 2025-01-09 ASSESSMENT — LID POSITION - DERMATOCHALASIS
OS_DERMATOCHALASIS: LUL 1+
OD_DERMATOCHALASIS: RUL 1+

## 2025-01-09 ASSESSMENT — SUPERFICIAL PUNCTATE KERATITIS (SPK)
OS_SPK: 1+
OD_SPK: 1+

## 2025-01-09 ASSESSMENT — TONOMETRY
OS_IOP_MMHG: 16
OD_IOP_MMHG: 18

## 2025-01-09 ASSESSMENT — CONFRONTATIONAL VISUAL FIELD TEST (CVF)
OD_FINDINGS: FULL
OS_FINDINGS: FULL

## 2025-01-09 ASSESSMENT — VISUAL ACUITY
OD_BCVA: 20/20
OS_BCVA: 20/25

## 2025-01-09 ASSESSMENT — KERATOMETRY: METHOD_AUTO_MANUAL: AUTO

## 2025-01-09 ASSESSMENT — LID EXAM ASSESSMENTS
OS_BLEPHARITIS: LLL LUL T 1+
OD_BLEPHARITIS: RLL RUL T 1+

## 2025-01-10 ENCOUNTER — APPOINTMENT (OUTPATIENT)
Dept: INFECTIOUS DISEASE | Facility: CLINIC | Age: 80
End: 2025-01-10

## 2025-01-10 ENCOUNTER — APPOINTMENT (OUTPATIENT)
Dept: ORTHOPEDIC SURGERY | Facility: CLINIC | Age: 80
End: 2025-01-10

## 2025-01-13 ENCOUNTER — NON-APPOINTMENT (OUTPATIENT)
Age: 80
End: 2025-01-13

## 2025-02-03 ENCOUNTER — NON-APPOINTMENT (OUTPATIENT)
Age: 80
End: 2025-02-03

## 2025-02-04 ENCOUNTER — NON-APPOINTMENT (OUTPATIENT)
Age: 80
End: 2025-02-04

## 2025-02-04 ENCOUNTER — APPOINTMENT (OUTPATIENT)
Dept: NEUROSURGERY | Facility: CLINIC | Age: 80
End: 2025-02-04
Payer: MEDICARE

## 2025-02-04 VITALS
SYSTOLIC BLOOD PRESSURE: 161 MMHG | WEIGHT: 165 LBS | OXYGEN SATURATION: 97 % | BODY MASS INDEX: 23.1 KG/M2 | DIASTOLIC BLOOD PRESSURE: 74 MMHG | HEIGHT: 71 IN | HEART RATE: 63 BPM | TEMPERATURE: 98.3 F

## 2025-02-04 PROCEDURE — 99215 OFFICE O/P EST HI 40 MIN: CPT

## 2025-02-04 PROCEDURE — 99205 OFFICE O/P NEW HI 60 MIN: CPT

## 2025-02-27 ENCOUNTER — APPOINTMENT (OUTPATIENT)
Dept: MRI IMAGING | Facility: CLINIC | Age: 80
End: 2025-02-27
Payer: MEDICARE

## 2025-02-27 ENCOUNTER — OUTPATIENT (OUTPATIENT)
Dept: OUTPATIENT SERVICES | Facility: HOSPITAL | Age: 80
LOS: 1 days | End: 2025-02-27

## 2025-02-27 DIAGNOSIS — Z98.890 OTHER SPECIFIED POSTPROCEDURAL STATES: Chronic | ICD-10-CM

## 2025-02-27 DIAGNOSIS — M54.50 LOW BACK PAIN, UNSPECIFIED: ICD-10-CM

## 2025-02-27 DIAGNOSIS — Z98.1 ARTHRODESIS STATUS: ICD-10-CM

## 2025-02-27 DIAGNOSIS — Z98.49 CATARACT EXTRACTION STATUS, UNSPECIFIED EYE: Chronic | ICD-10-CM

## 2025-02-27 DIAGNOSIS — M54.16 RADICULOPATHY, LUMBAR REGION: ICD-10-CM

## 2025-02-27 PROCEDURE — 72148 MRI LUMBAR SPINE W/O DYE: CPT | Mod: 26

## 2025-02-28 ENCOUNTER — APPOINTMENT (OUTPATIENT)
Dept: NEUROSURGERY | Facility: CLINIC | Age: 80
End: 2025-02-28
Payer: MEDICARE

## 2025-02-28 VITALS
HEIGHT: 71 IN | DIASTOLIC BLOOD PRESSURE: 77 MMHG | WEIGHT: 165 LBS | OXYGEN SATURATION: 99 % | TEMPERATURE: 98 F | BODY MASS INDEX: 23.1 KG/M2 | HEART RATE: 63 BPM | SYSTOLIC BLOOD PRESSURE: 138 MMHG

## 2025-02-28 PROCEDURE — 99214 OFFICE O/P EST MOD 30 MIN: CPT

## 2025-03-03 RX ORDER — LIDOCAINE 40 MG/G
4 PATCH TOPICAL
Qty: 10 | Refills: 1 | Status: ACTIVE | COMMUNITY
Start: 2025-03-03 | End: 1900-01-01

## 2025-03-24 ENCOUNTER — APPOINTMENT (OUTPATIENT)
Dept: CT IMAGING | Facility: CLINIC | Age: 80
End: 2025-03-24
Payer: COMMERCIAL

## 2025-03-24 ENCOUNTER — OUTPATIENT (OUTPATIENT)
Dept: OUTPATIENT SERVICES | Facility: HOSPITAL | Age: 80
LOS: 1 days | End: 2025-03-24

## 2025-03-24 DIAGNOSIS — Z98.890 OTHER SPECIFIED POSTPROCEDURAL STATES: Chronic | ICD-10-CM

## 2025-03-24 DIAGNOSIS — Z98.49 CATARACT EXTRACTION STATUS, UNSPECIFIED EYE: Chronic | ICD-10-CM

## 2025-03-24 DIAGNOSIS — R10.32 LEFT LOWER QUADRANT PAIN: ICD-10-CM

## 2025-03-24 PROCEDURE — 74177 CT ABD & PELVIS W/CONTRAST: CPT | Mod: 26

## 2025-04-16 ENCOUNTER — APPOINTMENT (OUTPATIENT)
Dept: ORTHOPEDIC SURGERY | Facility: CLINIC | Age: 80
End: 2025-04-16
Payer: MEDICARE

## 2025-04-16 VITALS — HEIGHT: 71 IN | WEIGHT: 165 LBS | BODY MASS INDEX: 23.1 KG/M2

## 2025-04-16 PROCEDURE — 20610 DRAIN/INJ JOINT/BURSA W/O US: CPT | Mod: 50

## 2025-04-16 PROCEDURE — 99213 OFFICE O/P EST LOW 20 MIN: CPT | Mod: 25

## 2025-04-23 ENCOUNTER — APPOINTMENT (OUTPATIENT)
Dept: ORTHOPEDIC SURGERY | Facility: CLINIC | Age: 80
End: 2025-04-23
Payer: MEDICARE

## 2025-04-23 VITALS — WEIGHT: 165 LBS | BODY MASS INDEX: 23.1 KG/M2 | HEIGHT: 71 IN

## 2025-04-23 PROCEDURE — 20610 DRAIN/INJ JOINT/BURSA W/O US: CPT | Mod: 50

## 2025-04-30 ENCOUNTER — APPOINTMENT (OUTPATIENT)
Dept: ORTHOPEDIC SURGERY | Facility: CLINIC | Age: 80
End: 2025-04-30
Payer: COMMERCIAL

## 2025-04-30 VITALS — BODY MASS INDEX: 23.1 KG/M2 | HEIGHT: 71 IN | WEIGHT: 165 LBS

## 2025-04-30 PROCEDURE — 20610 DRAIN/INJ JOINT/BURSA W/O US: CPT | Mod: 50

## 2025-05-06 ENCOUNTER — APPOINTMENT (OUTPATIENT)
Dept: NEUROSURGERY | Facility: CLINIC | Age: 80
End: 2025-05-06
Payer: MEDICARE

## 2025-05-06 VITALS
OXYGEN SATURATION: 98 % | WEIGHT: 165 LBS | HEART RATE: 61 BPM | TEMPERATURE: 98.1 F | BODY MASS INDEX: 23.1 KG/M2 | DIASTOLIC BLOOD PRESSURE: 71 MMHG | HEIGHT: 71 IN | SYSTOLIC BLOOD PRESSURE: 118 MMHG

## 2025-05-06 PROCEDURE — 99214 OFFICE O/P EST MOD 30 MIN: CPT

## 2025-05-08 ENCOUNTER — APPOINTMENT (OUTPATIENT)
Dept: ORTHOPEDIC SURGERY | Facility: CLINIC | Age: 80
End: 2025-05-08
Payer: COMMERCIAL

## 2025-05-08 VITALS — BODY MASS INDEX: 23.1 KG/M2 | WEIGHT: 165 LBS | HEIGHT: 71 IN

## 2025-05-08 PROCEDURE — 20610 DRAIN/INJ JOINT/BURSA W/O US: CPT | Mod: 50

## 2025-05-14 ENCOUNTER — APPOINTMENT (OUTPATIENT)
Dept: ORTHOPEDIC SURGERY | Facility: CLINIC | Age: 80
End: 2025-05-14
Payer: COMMERCIAL

## 2025-05-14 VITALS — HEIGHT: 71 IN | WEIGHT: 165 LBS | BODY MASS INDEX: 23.1 KG/M2

## 2025-05-14 DIAGNOSIS — M17.0 BILATERAL PRIMARY OSTEOARTHRITIS OF KNEE: ICD-10-CM

## 2025-05-14 PROCEDURE — 20610 DRAIN/INJ JOINT/BURSA W/O US: CPT | Mod: 50

## 2025-05-15 RX ORDER — HYDROCHLOROTHIAZIDE 12.5 MG/1
12.5 TABLET ORAL
Qty: 90 | Refills: 1 | Status: ACTIVE | COMMUNITY
Start: 2025-05-15 | End: 1900-01-01

## 2025-05-15 RX ORDER — LOSARTAN POTASSIUM 100 MG/1
100 TABLET, FILM COATED ORAL
Qty: 90 | Refills: 1 | Status: ACTIVE | COMMUNITY
Start: 2025-05-15 | End: 1900-01-01

## 2025-06-09 ENCOUNTER — APPOINTMENT (OUTPATIENT)
Dept: CARDIOLOGY | Facility: CLINIC | Age: 80
End: 2025-06-09

## 2025-06-09 VITALS
WEIGHT: 165 LBS | HEART RATE: 66 BPM | BODY MASS INDEX: 23.1 KG/M2 | DIASTOLIC BLOOD PRESSURE: 62 MMHG | SYSTOLIC BLOOD PRESSURE: 130 MMHG | HEIGHT: 71 IN

## 2025-06-09 PROCEDURE — 93000 ELECTROCARDIOGRAM COMPLETE: CPT

## 2025-06-09 PROCEDURE — 99214 OFFICE O/P EST MOD 30 MIN: CPT | Mod: 25

## 2025-06-25 ENCOUNTER — APPOINTMENT (OUTPATIENT)
Dept: ORTHOPEDIC SURGERY | Facility: CLINIC | Age: 80
End: 2025-06-25
Payer: MEDICARE

## 2025-06-25 VITALS
DIASTOLIC BLOOD PRESSURE: 65 MMHG | WEIGHT: 165 LBS | HEIGHT: 71 IN | BODY MASS INDEX: 23.1 KG/M2 | SYSTOLIC BLOOD PRESSURE: 128 MMHG

## 2025-06-25 PROCEDURE — 20610 DRAIN/INJ JOINT/BURSA W/O US: CPT | Mod: RT

## 2025-06-25 PROCEDURE — 99213 OFFICE O/P EST LOW 20 MIN: CPT | Mod: 25

## 2025-07-15 ENCOUNTER — APPOINTMENT (OUTPATIENT)
Dept: ORTHOPEDIC SURGERY | Facility: CLINIC | Age: 80
End: 2025-07-15
Payer: COMMERCIAL

## 2025-07-15 VITALS
SYSTOLIC BLOOD PRESSURE: 156 MMHG | WEIGHT: 170 LBS | BODY MASS INDEX: 23.8 KG/M2 | HEART RATE: 62 BPM | DIASTOLIC BLOOD PRESSURE: 67 MMHG | HEIGHT: 71 IN

## 2025-07-15 PROCEDURE — 72100 X-RAY EXAM L-S SPINE 2/3 VWS: CPT

## 2025-07-15 PROCEDURE — 99204 OFFICE O/P NEW MOD 45 MIN: CPT

## 2025-07-22 ENCOUNTER — APPOINTMENT (OUTPATIENT)
Dept: PHYSICAL MEDICINE AND REHAB | Facility: CLINIC | Age: 80
End: 2025-07-22
Payer: MEDICARE

## 2025-07-22 VITALS
RESPIRATION RATE: 14 BRPM | BODY MASS INDEX: 23.66 KG/M2 | HEIGHT: 71 IN | HEART RATE: 56 BPM | WEIGHT: 169 LBS | SYSTOLIC BLOOD PRESSURE: 158 MMHG | DIASTOLIC BLOOD PRESSURE: 67 MMHG

## 2025-07-22 PROCEDURE — 95910 NRV CNDJ TEST 7-8 STUDIES: CPT

## 2025-07-25 ENCOUNTER — APPOINTMENT (OUTPATIENT)
Dept: MRI IMAGING | Facility: CLINIC | Age: 80
End: 2025-07-25

## 2025-07-25 ENCOUNTER — OUTPATIENT (OUTPATIENT)
Dept: OUTPATIENT SERVICES | Facility: HOSPITAL | Age: 80
LOS: 1 days | End: 2025-07-25
Payer: COMMERCIAL

## 2025-07-25 DIAGNOSIS — M67.952 UNSPECIFIED DISORDER OF SYNOVIUM AND TENDON, LEFT THIGH: ICD-10-CM

## 2025-07-25 PROCEDURE — 73721 MRI JNT OF LWR EXTRE W/O DYE: CPT | Mod: 26,LT

## 2025-07-29 ENCOUNTER — APPOINTMENT (OUTPATIENT)
Dept: PHYSICAL MEDICINE AND REHAB | Facility: CLINIC | Age: 80
End: 2025-07-29
Payer: MEDICARE

## 2025-07-29 VITALS
HEIGHT: 71 IN | BODY MASS INDEX: 23.66 KG/M2 | RESPIRATION RATE: 15 BRPM | HEART RATE: 56 BPM | SYSTOLIC BLOOD PRESSURE: 157 MMHG | WEIGHT: 169 LBS | DIASTOLIC BLOOD PRESSURE: 71 MMHG

## 2025-07-29 DIAGNOSIS — G62.9 POLYNEUROPATHY, UNSPECIFIED: ICD-10-CM

## 2025-07-29 PROCEDURE — 95861 NEEDLE EMG 2 EXTREMITIES: CPT

## 2025-08-01 ENCOUNTER — APPOINTMENT (OUTPATIENT)
Dept: ORTHOPEDIC SURGERY | Facility: CLINIC | Age: 80
End: 2025-08-01
Payer: MEDICARE

## 2025-08-01 VITALS
DIASTOLIC BLOOD PRESSURE: 70 MMHG | HEIGHT: 71 IN | WEIGHT: 169 LBS | HEART RATE: 66 BPM | BODY MASS INDEX: 23.66 KG/M2 | SYSTOLIC BLOOD PRESSURE: 176 MMHG

## 2025-08-01 DIAGNOSIS — Z98.1 ARTHRODESIS STATUS: ICD-10-CM

## 2025-08-01 DIAGNOSIS — M79.18 MYALGIA, OTHER SITE: ICD-10-CM

## 2025-08-01 DIAGNOSIS — M67.952 UNSPECIFIED DISORDER OF SYNOVIUM AND TENDON, LEFT THIGH: ICD-10-CM

## 2025-08-01 PROCEDURE — 99214 OFFICE O/P EST MOD 30 MIN: CPT

## 2025-08-01 PROCEDURE — 72100 X-RAY EXAM L-S SPINE 2/3 VWS: CPT

## 2025-08-20 RX ORDER — MELOXICAM 7.5 MG/1
7.5 TABLET ORAL
Qty: 60 | Refills: 0 | Status: ACTIVE | COMMUNITY
Start: 2025-08-20 | End: 1900-01-01

## 2025-08-26 ENCOUNTER — APPOINTMENT (OUTPATIENT)
Dept: ORTHOPEDIC SURGERY | Facility: CLINIC | Age: 80
End: 2025-08-26

## 2025-09-03 ENCOUNTER — APPOINTMENT (OUTPATIENT)
Dept: ORTHOPEDIC SURGERY | Facility: CLINIC | Age: 80
End: 2025-09-03
Payer: MEDICARE

## 2025-09-03 VITALS
SYSTOLIC BLOOD PRESSURE: 169 MMHG | DIASTOLIC BLOOD PRESSURE: 74 MMHG | BODY MASS INDEX: 23.66 KG/M2 | HEIGHT: 71 IN | WEIGHT: 169 LBS

## 2025-09-03 DIAGNOSIS — M17.0 BILATERAL PRIMARY OSTEOARTHRITIS OF KNEE: ICD-10-CM

## 2025-09-03 PROCEDURE — 99213 OFFICE O/P EST LOW 20 MIN: CPT

## 2025-09-03 PROCEDURE — G2211 COMPLEX E/M VISIT ADD ON: CPT

## (undated) DEVICE — POSITIONER FOAM LAMINECTOMY ARM CRADLE (PINK)

## (undated) DEVICE — PACK UNIVERSAL DRAPE

## (undated) DEVICE — MIDAS REX LEGEND BALL FLUTED SM BORE 5.0MM X 10CM

## (undated) DEVICE — FRAZIER SUCTION TIP 10FR

## (undated) DEVICE — VENODYNE/SCD SLEEVE CALF MEDIUM

## (undated) DEVICE — ELCTR BOVIE TIP BLADE INSULATED 6.5" EDGE

## (undated) DEVICE — ELCTR BIPOLAR PROBE

## (undated) DEVICE — SUT VICRYL 2-0 18" CP-2 UNDYED (POP-OFF)

## (undated) DEVICE — ELCTR PEDICLE SCREW PROBE 3MM BALL 1.8MM X 100MM

## (undated) DEVICE — TUBING FOR SMOKE EVACUATOR (PURPLE END)

## (undated) DEVICE — TAPE SILK 3"

## (undated) DEVICE — ELCTR SUBDERMAL CORKSCREW NDL 1.2MM

## (undated) DEVICE — WARMING BLANKET LOWER ADULT

## (undated) DEVICE — GOWN XXL

## (undated) DEVICE — ELCTR SUBDERMAL NDL 27G X 1/2" WITH TWISTED PAIR

## (undated) DEVICE — SUT VICRYL 0 18" CT-1 UNDYED (POP-OFF)

## (undated) DEVICE — PACK NEURO

## (undated) DEVICE — URETERAL CATH RED RUBBER 16FR (ORANGE)

## (undated) DEVICE — SUT MONOCRYL 4-0 27" PS-2 UNDYED

## (undated) DEVICE — DRAPE TOWEL 1000 SMALL 17" X 11"

## (undated) DEVICE — ELCTR ROCKER SWITCH PENCIL BLUE 10FT

## (undated) DEVICE — MIDAS REX LEGEND BALL FLUTED SM BORE 6.0MM X 10CM

## (undated) DEVICE — DRAIN JACKSON PRATT 7MM FLAT FULL W 15 FR TROCAR

## (undated) DEVICE — ELCTR 4-DISC 20MM 49" (RED, BLUE, GREEN, BLACK)

## (undated) DEVICE — ELCTR BOVIE TIP BLADE INSULATED 4" EDGE

## (undated) DEVICE — MIDAS REX LEGEND MATCH HEAD FLUTED SM BORE 3.0MM X 10CM

## (undated) DEVICE — ELCTR MONOPOLAR STIMULATOR PROBE FLUSH-TIP

## (undated) DEVICE — DRAPE 3/4 SHEET 52X76"

## (undated) DEVICE — PREP DURAPREP 26CC

## (undated) DEVICE — PREP BETADINE KIT

## (undated) DEVICE — ELCTR SUBDERMAL NDL CLASSIC 1.5M X 59" (6 COLOR)

## (undated) DEVICE — GLV 8.5 PROTEXIS (WHITE)

## (undated) DEVICE — POSITIONER FOAM EGG CRATE ULNAR 2PCS (PINK)

## (undated) DEVICE — TAP DUAL LEAD 5MM